# Patient Record
Sex: MALE | Race: WHITE | Employment: OTHER | URBAN - METROPOLITAN AREA
[De-identification: names, ages, dates, MRNs, and addresses within clinical notes are randomized per-mention and may not be internally consistent; named-entity substitution may affect disease eponyms.]

---

## 2018-04-04 ENCOUNTER — HOSPITAL ENCOUNTER (EMERGENCY)
Age: 83
Discharge: HOME | End: 2018-04-04
Payer: MEDICARE

## 2018-04-04 VITALS
WEIGHT: 163 LBS | HEIGHT: 67 IN | SYSTOLIC BLOOD PRESSURE: 145 MMHG | DIASTOLIC BLOOD PRESSURE: 89 MMHG | BODY MASS INDEX: 25.58 KG/M2 | TEMPERATURE: 97.9 F | RESPIRATION RATE: 18 BRPM | HEART RATE: 70 BPM | OXYGEN SATURATION: 98 %

## 2018-04-04 DIAGNOSIS — R04.0 EPISTAXIS: Primary | ICD-10-CM

## 2018-04-04 LAB
HCT VFR BLD CALC: 39.8 % (ref 37–54)
HEMOGLOBIN: 13 G/DL (ref 12.5–16.5)
INR BLD: 3
MCH RBC QN AUTO: 32.1 PG (ref 26–35)
MCHC RBC AUTO-ENTMCNC: 32.7 % (ref 32–34.5)
MCV RBC AUTO: 98.3 FL (ref 80–99.9)
PDW BLD-RTO: 14.5 FL (ref 11.5–15)
PLATELET # BLD: 160 E9/L (ref 130–450)
PMV BLD AUTO: 10.1 FL (ref 7–12)
PROTHROMBIN TIME: 33.8 SEC (ref 9.3–12.4)
RBC # BLD: 4.05 E12/L (ref 3.8–5.8)
WBC # BLD: 5.7 E9/L (ref 4.5–11.5)

## 2018-04-04 PROCEDURE — 85610 PROTHROMBIN TIME: CPT

## 2018-04-04 PROCEDURE — 85027 COMPLETE CBC AUTOMATED: CPT

## 2018-04-04 PROCEDURE — 36415 COLL VENOUS BLD VENIPUNCTURE: CPT

## 2018-04-04 PROCEDURE — 99283 EMERGENCY DEPT VISIT LOW MDM: CPT

## 2018-05-08 ENCOUNTER — OFFICE VISIT (OUTPATIENT)
Dept: NEUROLOGY | Age: 83
End: 2018-05-08
Payer: MEDICARE

## 2018-05-08 VITALS
HEIGHT: 67 IN | BODY MASS INDEX: 24.17 KG/M2 | TEMPERATURE: 97.7 F | WEIGHT: 154 LBS | OXYGEN SATURATION: 96 % | DIASTOLIC BLOOD PRESSURE: 78 MMHG | HEART RATE: 72 BPM | SYSTOLIC BLOOD PRESSURE: 123 MMHG

## 2018-05-08 DIAGNOSIS — G25.0 ESSENTIAL TREMOR: Primary | ICD-10-CM

## 2018-05-08 DIAGNOSIS — G47.52 REM SLEEP BEHAVIOR DISORDER: ICD-10-CM

## 2018-05-08 PROCEDURE — 99205 OFFICE O/P NEW HI 60 MIN: CPT | Performed by: PSYCHIATRY & NEUROLOGY

## 2018-05-08 RX ORDER — PRIMIDONE 50 MG/1
25 TABLET ORAL NIGHTLY
Qty: 30 TABLET | Refills: 5 | Status: SHIPPED | OUTPATIENT
Start: 2018-05-08 | End: 2018-10-01 | Stop reason: ALTCHOICE

## 2018-10-22 ENCOUNTER — OFFICE VISIT (OUTPATIENT)
Dept: NEUROLOGY | Age: 83
End: 2018-10-22
Payer: MEDICARE

## 2018-10-22 VITALS
RESPIRATION RATE: 18 BRPM | DIASTOLIC BLOOD PRESSURE: 72 MMHG | TEMPERATURE: 96.9 F | HEIGHT: 66 IN | OXYGEN SATURATION: 100 % | WEIGHT: 157 LBS | HEART RATE: 71 BPM | BODY MASS INDEX: 25.23 KG/M2 | SYSTOLIC BLOOD PRESSURE: 114 MMHG

## 2018-10-22 DIAGNOSIS — G25.0 ESSENTIAL TREMOR: Primary | ICD-10-CM

## 2018-10-22 DIAGNOSIS — G47.52 REM SLEEP BEHAVIOR DISORDER: ICD-10-CM

## 2018-10-22 PROCEDURE — 99215 OFFICE O/P EST HI 40 MIN: CPT | Performed by: PSYCHIATRY & NEUROLOGY

## 2018-12-04 ENCOUNTER — HOSPITAL ENCOUNTER (OUTPATIENT)
Age: 83
Discharge: HOME | End: 2018-12-04
Payer: MEDICARE

## 2018-12-04 LAB
INR BLD: 3.7
PROTHROMBIN TIME: 41.4 SEC (ref 9.3–12.4)

## 2018-12-04 PROCEDURE — 36415 COLL VENOUS BLD VENIPUNCTURE: CPT

## 2018-12-04 PROCEDURE — 85610 PROTHROMBIN TIME: CPT

## 2018-12-18 ENCOUNTER — HOSPITAL ENCOUNTER (OUTPATIENT)
Age: 83
Discharge: HOME | End: 2018-12-18
Payer: MEDICARE

## 2018-12-18 LAB
INR BLD: 2.1
PROTHROMBIN TIME: 23.7 SEC (ref 9.3–12.4)

## 2018-12-18 PROCEDURE — 85610 PROTHROMBIN TIME: CPT

## 2018-12-18 PROCEDURE — 36415 COLL VENOUS BLD VENIPUNCTURE: CPT

## 2018-12-31 ENCOUNTER — APPOINTMENT (OUTPATIENT)
Dept: GENERAL RADIOLOGY | Age: 83
DRG: 535 | End: 2018-12-31
Payer: MEDICARE

## 2018-12-31 ENCOUNTER — HOSPITAL ENCOUNTER (EMERGENCY)
Age: 83
Discharge: HOME | DRG: 535 | End: 2018-12-31
Payer: MEDICARE

## 2018-12-31 VITALS
HEART RATE: 97 BPM | WEIGHT: 160 LBS | SYSTOLIC BLOOD PRESSURE: 107 MMHG | DIASTOLIC BLOOD PRESSURE: 64 MMHG | BODY MASS INDEX: 25.82 KG/M2 | OXYGEN SATURATION: 97 % | TEMPERATURE: 98.3 F | RESPIRATION RATE: 20 BRPM

## 2018-12-31 DIAGNOSIS — J40 BRONCHITIS: Primary | ICD-10-CM

## 2018-12-31 PROCEDURE — 99212 OFFICE O/P EST SF 10 MIN: CPT

## 2018-12-31 PROCEDURE — 71046 X-RAY EXAM CHEST 2 VIEWS: CPT

## 2018-12-31 RX ORDER — DOXYCYCLINE HYCLATE 100 MG
100 TABLET ORAL 2 TIMES DAILY
Qty: 20 TABLET | Refills: 0 | Status: SHIPPED | OUTPATIENT
Start: 2018-12-31 | End: 2019-01-10

## 2018-12-31 RX ORDER — PREDNISONE 20 MG/1
40 TABLET ORAL DAILY
Qty: 10 TABLET | Refills: 0 | Status: ON HOLD | OUTPATIENT
Start: 2018-12-31 | End: 2019-01-02

## 2019-01-01 ENCOUNTER — APPOINTMENT (OUTPATIENT)
Dept: CT IMAGING | Age: 84
DRG: 535 | End: 2019-01-01
Payer: MEDICARE

## 2019-01-01 ENCOUNTER — APPOINTMENT (OUTPATIENT)
Dept: GENERAL RADIOLOGY | Age: 84
DRG: 535 | End: 2019-01-01
Payer: MEDICARE

## 2019-01-01 ENCOUNTER — HOSPITAL ENCOUNTER (INPATIENT)
Age: 84
LOS: 2 days | Discharge: NURSING, REHAB OR LONG-TERM CARE | DRG: 535 | End: 2019-01-03
Attending: EMERGENCY MEDICINE | Admitting: ORTHOPAEDIC SURGERY
Payer: MEDICARE
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
## 2019-01-01 DIAGNOSIS — S09.90XA INJURY OF HEAD, INITIAL ENCOUNTER: ICD-10-CM

## 2019-01-01 DIAGNOSIS — S32.591A CLOSED FRACTURE OF RAMUS OF RIGHT PUBIS, INITIAL ENCOUNTER (HCC): Primary | ICD-10-CM

## 2019-01-01 LAB
INR BLD: 2.1
PROTHROMBIN TIME: 24 SEC (ref 9.3–12.4)

## 2019-01-01 PROCEDURE — G0378 HOSPITAL OBSERVATION PER HR: HCPCS

## 2019-01-01 PROCEDURE — 99285 EMERGENCY DEPT VISIT HI MDM: CPT

## 2019-01-01 PROCEDURE — 85610 PROTHROMBIN TIME: CPT

## 2019-01-01 PROCEDURE — 73502 X-RAY EXAM HIP UNI 2-3 VIEWS: CPT

## 2019-01-01 PROCEDURE — 1200000000 HC SEMI PRIVATE

## 2019-01-01 PROCEDURE — 70450 CT HEAD/BRAIN W/O DYE: CPT

## 2019-01-01 PROCEDURE — 36415 COLL VENOUS BLD VENIPUNCTURE: CPT

## 2019-01-01 PROCEDURE — 72125 CT NECK SPINE W/O DYE: CPT

## 2019-01-01 RX ORDER — DOXYCYCLINE HYCLATE 100 MG/1
100 CAPSULE ORAL ONCE
Status: COMPLETED | OUTPATIENT
Start: 2019-01-01 | End: 2019-01-02

## 2019-01-01 ASSESSMENT — ENCOUNTER SYMPTOMS
TROUBLE SWALLOWING: 0
SHORTNESS OF BREATH: 0
COUGH: 0
BOWEL INCONTINENCE: 0
VISUAL CHANGE: 0
VOMITING: 0
ABDOMINAL PAIN: 0
CHEST TIGHTNESS: 0
BACK PAIN: 0
WHEEZING: 0
PHOTOPHOBIA: 0
VOICE CHANGE: 0
NAUSEA: 0

## 2019-01-02 ENCOUNTER — APPOINTMENT (OUTPATIENT)
Dept: CT IMAGING | Age: 84
DRG: 535 | End: 2019-01-02
Payer: MEDICARE

## 2019-01-02 ENCOUNTER — APPOINTMENT (OUTPATIENT)
Dept: GENERAL RADIOLOGY | Age: 84
DRG: 535 | End: 2019-01-02
Payer: MEDICARE

## 2019-01-02 PROBLEM — J44.9 COPD (CHRONIC OBSTRUCTIVE PULMONARY DISEASE) (HCC): Status: ACTIVE | Noted: 2019-01-02

## 2019-01-02 PROBLEM — J96.01 ACUTE RESPIRATORY FAILURE WITH HYPOXIA (HCC): Status: ACTIVE | Noted: 2019-01-02

## 2019-01-02 PROBLEM — J20.9 ACUTE BRONCHITIS: Status: ACTIVE | Noted: 2019-01-02

## 2019-01-02 LAB
ANION GAP SERPL CALCULATED.3IONS-SCNC: 13 MMOL/L (ref 7–16)
BASOPHILS ABSOLUTE: 0.02 E9/L (ref 0–0.2)
BASOPHILS RELATIVE PERCENT: 0.3 % (ref 0–2)
BUN BLDV-MCNC: 26 MG/DL (ref 8–23)
CALCIUM SERPL-MCNC: 8.1 MG/DL (ref 8.6–10.2)
CHLORIDE BLD-SCNC: 106 MMOL/L (ref 98–107)
CO2: 22 MMOL/L (ref 22–29)
CREAT SERPL-MCNC: 0.9 MG/DL (ref 0.7–1.2)
EOSINOPHILS ABSOLUTE: 0.32 E9/L (ref 0.05–0.5)
EOSINOPHILS RELATIVE PERCENT: 4.4 % (ref 0–6)
FILM ARRAY ADENOVIRUS: ABNORMAL
FILM ARRAY BORDETELLA PERTUSSIS: ABNORMAL
FILM ARRAY CHLAMYDOPHILIA PNEUMONIAE: ABNORMAL
FILM ARRAY CORONAVIRUS 229E: ABNORMAL
FILM ARRAY CORONAVIRUS HKU1: ABNORMAL
FILM ARRAY CORONAVIRUS NL63: ABNORMAL
FILM ARRAY CORONAVIRUS OC43: ABNORMAL
FILM ARRAY INFLUENZA A VIRUS 09H1: ABNORMAL
FILM ARRAY INFLUENZA A VIRUS H1: ABNORMAL
FILM ARRAY INFLUENZA A VIRUS H3: ABNORMAL
FILM ARRAY INFLUENZA A VIRUS: ABNORMAL
FILM ARRAY INFLUENZA B: ABNORMAL
FILM ARRAY METAPNEUMOVIRUS: ABNORMAL
FILM ARRAY MYCOPLASMA PNEUMONIAE: ABNORMAL
FILM ARRAY PARAINFLUENZA VIRUS 1: ABNORMAL
FILM ARRAY PARAINFLUENZA VIRUS 2: ABNORMAL
FILM ARRAY PARAINFLUENZA VIRUS 3: ABNORMAL
FILM ARRAY PARAINFLUENZA VIRUS 4: ABNORMAL
FILM ARRAY RESPIRATORY SYNCITIAL VIRUS: ABNORMAL
GFR AFRICAN AMERICAN: >60
GFR NON-AFRICAN AMERICAN: >60 ML/MIN/1.73
GLUCOSE BLD-MCNC: 164 MG/DL (ref 74–99)
HCT VFR BLD CALC: 37.7 % (ref 37–54)
HEMOGLOBIN: 12.3 G/DL (ref 12.5–16.5)
IMMATURE GRANULOCYTES #: 0.03 E9/L
IMMATURE GRANULOCYTES %: 0.4 % (ref 0–5)
INR BLD: 3.2
LYMPHOCYTES ABSOLUTE: 1.1 E9/L (ref 1.5–4)
LYMPHOCYTES RELATIVE PERCENT: 15 % (ref 20–42)
MCH RBC QN AUTO: 31.9 PG (ref 26–35)
MCHC RBC AUTO-ENTMCNC: 32.6 % (ref 32–34.5)
MCV RBC AUTO: 97.9 FL (ref 80–99.9)
MONOCYTES ABSOLUTE: 0.37 E9/L (ref 0.1–0.95)
MONOCYTES RELATIVE PERCENT: 5 % (ref 2–12)
NEUTROPHILS ABSOLUTE: 5.51 E9/L (ref 1.8–7.3)
NEUTROPHILS RELATIVE PERCENT: 74.9 % (ref 43–80)
ORGANISM: ABNORMAL
PDW BLD-RTO: 14.1 FL (ref 11.5–15)
PLATELET # BLD: 127 E9/L (ref 130–450)
PMV BLD AUTO: 10.2 FL (ref 7–12)
POTASSIUM SERPL-SCNC: 3.9 MMOL/L (ref 3.5–5)
PROCALCITONIN: 0.17 NG/ML (ref 0–0.08)
PROTHROMBIN TIME: 36 SEC (ref 9.3–12.4)
RBC # BLD: 3.85 E12/L (ref 3.8–5.8)
SODIUM BLD-SCNC: 141 MMOL/L (ref 132–146)
WBC # BLD: 7.4 E9/L (ref 4.5–11.5)

## 2019-01-02 PROCEDURE — 87633 RESP VIRUS 12-25 TARGETS: CPT

## 2019-01-02 PROCEDURE — 6370000000 HC RX 637 (ALT 250 FOR IP): Performed by: EMERGENCY MEDICINE

## 2019-01-02 PROCEDURE — G8978 MOBILITY CURRENT STATUS: HCPCS | Performed by: PHYSICAL THERAPIST

## 2019-01-02 PROCEDURE — 99222 1ST HOSP IP/OBS MODERATE 55: CPT | Performed by: HOSPITALIST

## 2019-01-02 PROCEDURE — 6370000000 HC RX 637 (ALT 250 FOR IP): Performed by: ORTHOPAEDIC SURGERY

## 2019-01-02 PROCEDURE — 2700000000 HC OXYGEN THERAPY PER DAY

## 2019-01-02 PROCEDURE — 84145 PROCALCITONIN (PCT): CPT

## 2019-01-02 PROCEDURE — 80048 BASIC METABOLIC PNL TOTAL CA: CPT

## 2019-01-02 PROCEDURE — 97530 THERAPEUTIC ACTIVITIES: CPT | Performed by: PHYSICAL THERAPIST

## 2019-01-02 PROCEDURE — G8988 SELF CARE GOAL STATUS: HCPCS

## 2019-01-02 PROCEDURE — G8987 SELF CARE CURRENT STATUS: HCPCS

## 2019-01-02 PROCEDURE — 85610 PROTHROMBIN TIME: CPT

## 2019-01-02 PROCEDURE — 97530 THERAPEUTIC ACTIVITIES: CPT

## 2019-01-02 PROCEDURE — 70450 CT HEAD/BRAIN W/O DYE: CPT

## 2019-01-02 PROCEDURE — 6370000000 HC RX 637 (ALT 250 FOR IP): Performed by: HOSPITALIST

## 2019-01-02 PROCEDURE — 1200000000 HC SEMI PRIVATE

## 2019-01-02 PROCEDURE — G8979 MOBILITY GOAL STATUS: HCPCS | Performed by: PHYSICAL THERAPIST

## 2019-01-02 PROCEDURE — G0378 HOSPITAL OBSERVATION PER HR: HCPCS

## 2019-01-02 PROCEDURE — 87798 DETECT AGENT NOS DNA AMP: CPT

## 2019-01-02 PROCEDURE — 36415 COLL VENOUS BLD VENIPUNCTURE: CPT

## 2019-01-02 PROCEDURE — 2580000003 HC RX 258: Performed by: ORTHOPAEDIC SURGERY

## 2019-01-02 PROCEDURE — 87581 M.PNEUMON DNA AMP PROBE: CPT

## 2019-01-02 PROCEDURE — 97165 OT EVAL LOW COMPLEX 30 MIN: CPT

## 2019-01-02 PROCEDURE — 87486 CHLMYD PNEUM DNA AMP PROBE: CPT

## 2019-01-02 PROCEDURE — 97161 PT EVAL LOW COMPLEX 20 MIN: CPT | Performed by: PHYSICAL THERAPIST

## 2019-01-02 PROCEDURE — 85025 COMPLETE CBC W/AUTO DIFF WBC: CPT

## 2019-01-02 PROCEDURE — 71045 X-RAY EXAM CHEST 1 VIEW: CPT

## 2019-01-02 RX ORDER — TRAMADOL HYDROCHLORIDE 50 MG/1
50 TABLET ORAL EVERY 6 HOURS PRN
Status: DISCONTINUED | OUTPATIENT
Start: 2019-01-02 | End: 2019-01-03 | Stop reason: HOSPADM

## 2019-01-02 RX ORDER — SODIUM CHLORIDE 0.9 % (FLUSH) 0.9 %
10 SYRINGE (ML) INJECTION EVERY 12 HOURS SCHEDULED
Status: DISCONTINUED | OUTPATIENT
Start: 2019-01-02 | End: 2019-01-03 | Stop reason: HOSPADM

## 2019-01-02 RX ORDER — ATORVASTATIN CALCIUM 10 MG/1
10 TABLET, FILM COATED ORAL EVERY OTHER DAY
Status: DISCONTINUED | OUTPATIENT
Start: 2019-01-02 | End: 2019-01-03 | Stop reason: HOSPADM

## 2019-01-02 RX ORDER — WARFARIN SODIUM 2 MG/1
4 TABLET ORAL DAILY
Status: DISCONTINUED | OUTPATIENT
Start: 2019-01-02 | End: 2019-01-02 | Stop reason: CLARIF

## 2019-01-02 RX ORDER — WARFARIN SODIUM 5 MG/1
5 TABLET ORAL DAILY
Status: DISCONTINUED | OUTPATIENT
Start: 2019-01-02 | End: 2019-01-02 | Stop reason: CLARIF

## 2019-01-02 RX ORDER — WARFARIN SODIUM 5 MG/1
5 TABLET ORAL DAILY
Status: DISCONTINUED | OUTPATIENT
Start: 2019-01-02 | End: 2019-01-02

## 2019-01-02 RX ORDER — PREDNISONE 20 MG/1
20 TABLET ORAL DAILY
Status: DISCONTINUED | OUTPATIENT
Start: 2019-01-02 | End: 2019-01-03 | Stop reason: HOSPADM

## 2019-01-02 RX ORDER — DOXYCYCLINE HYCLATE 100 MG/1
100 CAPSULE ORAL 2 TIMES DAILY
Status: DISCONTINUED | OUTPATIENT
Start: 2019-01-02 | End: 2019-01-03 | Stop reason: HOSPADM

## 2019-01-02 RX ORDER — ONDANSETRON 2 MG/ML
4 INJECTION INTRAMUSCULAR; INTRAVENOUS EVERY 6 HOURS PRN
Status: DISCONTINUED | OUTPATIENT
Start: 2019-01-02 | End: 2019-01-03 | Stop reason: HOSPADM

## 2019-01-02 RX ORDER — WARFARIN SODIUM 4 MG/1
4 TABLET ORAL DAILY
Status: DISCONTINUED | OUTPATIENT
Start: 2019-01-02 | End: 2019-01-02

## 2019-01-02 RX ORDER — PREDNISONE 20 MG/1
40 TABLET ORAL DAILY
Status: DISCONTINUED | OUTPATIENT
Start: 2019-01-02 | End: 2019-01-02

## 2019-01-02 RX ORDER — METHYLPREDNISOLONE SODIUM SUCCINATE 40 MG/ML
40 INJECTION, POWDER, LYOPHILIZED, FOR SOLUTION INTRAMUSCULAR; INTRAVENOUS EVERY 8 HOURS
Status: DISCONTINUED | OUTPATIENT
Start: 2019-01-02 | End: 2019-01-02

## 2019-01-02 RX ORDER — MORPHINE SULFATE 4 MG/ML
2 INJECTION, SOLUTION INTRAMUSCULAR; INTRAVENOUS
Status: DISCONTINUED | OUTPATIENT
Start: 2019-01-02 | End: 2019-01-03 | Stop reason: HOSPADM

## 2019-01-02 RX ORDER — ACETAMINOPHEN 325 MG/1
650 TABLET ORAL EVERY 4 HOURS PRN
Status: DISCONTINUED | OUTPATIENT
Start: 2019-01-02 | End: 2019-01-03 | Stop reason: HOSPADM

## 2019-01-02 RX ORDER — SODIUM CHLORIDE 0.9 % (FLUSH) 0.9 %
10 SYRINGE (ML) INJECTION PRN
Status: DISCONTINUED | OUTPATIENT
Start: 2019-01-02 | End: 2019-01-03 | Stop reason: HOSPADM

## 2019-01-02 RX ORDER — ALBUTEROL SULFATE 2.5 MG/3ML
2.5 SOLUTION RESPIRATORY (INHALATION) EVERY 4 HOURS PRN
Status: DISCONTINUED | OUTPATIENT
Start: 2019-01-02 | End: 2019-01-03 | Stop reason: HOSPADM

## 2019-01-02 RX ORDER — ACETAMINOPHEN 325 MG/1
TABLET ORAL
Status: DISPENSED
Start: 2019-01-02 | End: 2019-01-02

## 2019-01-02 RX ORDER — CLONAZEPAM 0.5 MG/1
0.5 TABLET ORAL NIGHTLY
Status: DISCONTINUED | OUTPATIENT
Start: 2019-01-02 | End: 2019-01-03 | Stop reason: HOSPADM

## 2019-01-02 RX ADMIN — PREDNISONE 20 MG: 20 TABLET ORAL at 16:31

## 2019-01-02 RX ADMIN — Medication 10 ML: at 21:38

## 2019-01-02 RX ADMIN — ACETAMINOPHEN 650 MG: 325 TABLET, FILM COATED ORAL at 01:59

## 2019-01-02 RX ADMIN — ATORVASTATIN CALCIUM 10 MG: 10 TABLET, FILM COATED ORAL at 21:37

## 2019-01-02 RX ADMIN — Medication 10 ML: at 09:32

## 2019-01-02 RX ADMIN — DOXYCYCLINE HYCLATE 100 MG: 100 CAPSULE ORAL at 21:37

## 2019-01-02 RX ADMIN — DOXYCYCLINE HYCLATE 100 MG: 100 CAPSULE ORAL at 00:11

## 2019-01-02 RX ADMIN — DOXYCYCLINE HYCLATE 100 MG: 100 CAPSULE ORAL at 09:31

## 2019-01-02 RX ADMIN — CLONAZEPAM 0.5 MG: 0.5 TABLET ORAL at 21:37

## 2019-01-02 ASSESSMENT — PAIN SCALES - GENERAL
PAINLEVEL_OUTOF10: 0
PAINLEVEL_OUTOF10: 0

## 2019-01-03 VITALS
DIASTOLIC BLOOD PRESSURE: 81 MMHG | BODY MASS INDEX: 23.04 KG/M2 | RESPIRATION RATE: 16 BRPM | TEMPERATURE: 98.5 F | HEIGHT: 67 IN | OXYGEN SATURATION: 97 % | HEART RATE: 70 BPM | WEIGHT: 146.8 LBS | SYSTOLIC BLOOD PRESSURE: 143 MMHG

## 2019-01-03 LAB
ANION GAP SERPL CALCULATED.3IONS-SCNC: 12 MMOL/L (ref 7–16)
BASOPHILS ABSOLUTE: 0.01 E9/L (ref 0–0.2)
BASOPHILS RELATIVE PERCENT: 0.2 % (ref 0–2)
BUN BLDV-MCNC: 20 MG/DL (ref 8–23)
CALCIUM SERPL-MCNC: 8.4 MG/DL (ref 8.6–10.2)
CHLORIDE BLD-SCNC: 103 MMOL/L (ref 98–107)
CO2: 24 MMOL/L (ref 22–29)
CREAT SERPL-MCNC: 1 MG/DL (ref 0.7–1.2)
EOSINOPHILS ABSOLUTE: 0.12 E9/L (ref 0.05–0.5)
EOSINOPHILS RELATIVE PERCENT: 1.8 % (ref 0–6)
GFR AFRICAN AMERICAN: >60
GFR NON-AFRICAN AMERICAN: >60 ML/MIN/1.73
GLUCOSE BLD-MCNC: 126 MG/DL (ref 74–99)
HCT VFR BLD CALC: 36.8 % (ref 37–54)
HEMOGLOBIN: 12.2 G/DL (ref 12.5–16.5)
IMMATURE GRANULOCYTES #: 0.03 E9/L
IMMATURE GRANULOCYTES %: 0.5 % (ref 0–5)
INR BLD: 2.7
LYMPHOCYTES ABSOLUTE: 0.86 E9/L (ref 1.5–4)
LYMPHOCYTES RELATIVE PERCENT: 13 % (ref 20–42)
MCH RBC QN AUTO: 31.8 PG (ref 26–35)
MCHC RBC AUTO-ENTMCNC: 33.2 % (ref 32–34.5)
MCV RBC AUTO: 95.8 FL (ref 80–99.9)
MONOCYTES ABSOLUTE: 0.59 E9/L (ref 0.1–0.95)
MONOCYTES RELATIVE PERCENT: 8.9 % (ref 2–12)
NEUTROPHILS ABSOLUTE: 5.01 E9/L (ref 1.8–7.3)
NEUTROPHILS RELATIVE PERCENT: 75.6 % (ref 43–80)
PDW BLD-RTO: 14.3 FL (ref 11.5–15)
PLATELET # BLD: 130 E9/L (ref 130–450)
PMV BLD AUTO: 11.1 FL (ref 7–12)
POTASSIUM SERPL-SCNC: 3.9 MMOL/L (ref 3.5–5)
PROTHROMBIN TIME: 30.2 SEC (ref 9.3–12.4)
RBC # BLD: 3.84 E12/L (ref 3.8–5.8)
SODIUM BLD-SCNC: 139 MMOL/L (ref 132–146)
WBC # BLD: 6.6 E9/L (ref 4.5–11.5)

## 2019-01-03 PROCEDURE — 36415 COLL VENOUS BLD VENIPUNCTURE: CPT

## 2019-01-03 PROCEDURE — 6370000000 HC RX 637 (ALT 250 FOR IP): Performed by: HOSPITALIST

## 2019-01-03 PROCEDURE — 85025 COMPLETE CBC W/AUTO DIFF WBC: CPT

## 2019-01-03 PROCEDURE — 99232 SBSQ HOSP IP/OBS MODERATE 35: CPT | Performed by: HOSPITALIST

## 2019-01-03 PROCEDURE — 80048 BASIC METABOLIC PNL TOTAL CA: CPT

## 2019-01-03 PROCEDURE — 85610 PROTHROMBIN TIME: CPT

## 2019-01-03 PROCEDURE — 2580000003 HC RX 258: Performed by: ORTHOPAEDIC SURGERY

## 2019-01-03 PROCEDURE — APPSS30 APP SPLIT SHARED TIME 16-30 MINUTES: Performed by: NURSE PRACTITIONER

## 2019-01-03 PROCEDURE — 6370000000 HC RX 637 (ALT 250 FOR IP): Performed by: ORTHOPAEDIC SURGERY

## 2019-01-03 PROCEDURE — G0378 HOSPITAL OBSERVATION PER HR: HCPCS

## 2019-01-03 PROCEDURE — 6370000000 HC RX 637 (ALT 250 FOR IP): Performed by: NURSE PRACTITIONER

## 2019-01-03 RX ORDER — POLYETHYLENE GLYCOL 3350 17 G/17G
17 POWDER, FOR SOLUTION ORAL DAILY PRN
Status: DISCONTINUED | OUTPATIENT
Start: 2019-01-03 | End: 2019-01-03 | Stop reason: HOSPADM

## 2019-01-03 RX ORDER — TRAMADOL HYDROCHLORIDE 50 MG/1
50 TABLET ORAL EVERY 6 HOURS PRN
Qty: 30 TABLET | Refills: 0 | Status: SHIPPED | OUTPATIENT
Start: 2019-01-03 | End: 2019-01-10

## 2019-01-03 RX ORDER — GUAIFENESIN 400 MG/1
400 TABLET ORAL 4 TIMES DAILY PRN
Status: DISCONTINUED | OUTPATIENT
Start: 2019-01-03 | End: 2019-01-03 | Stop reason: HOSPADM

## 2019-01-03 RX ORDER — PREDNISONE 20 MG/1
20 TABLET ORAL DAILY
Qty: 10 TABLET | Refills: 0 | Status: SHIPPED | OUTPATIENT
Start: 2019-01-04 | End: 2019-01-14

## 2019-01-03 RX ORDER — WARFARIN SODIUM 4 MG/1
4 TABLET ORAL
Status: DISCONTINUED | OUTPATIENT
Start: 2019-01-03 | End: 2019-01-03 | Stop reason: HOSPADM

## 2019-01-03 RX ADMIN — POLYETHYLENE GLYCOL 3350 17 G: 17 POWDER, FOR SOLUTION ORAL at 09:47

## 2019-01-03 RX ADMIN — Medication 10 ML: at 09:48

## 2019-01-03 RX ADMIN — DOXYCYCLINE HYCLATE 100 MG: 100 CAPSULE ORAL at 09:46

## 2019-01-03 RX ADMIN — PREDNISONE 20 MG: 20 TABLET ORAL at 09:46

## 2019-01-03 ASSESSMENT — PAIN SCALES - GENERAL: PAINLEVEL_OUTOF10: 0

## 2019-02-14 ENCOUNTER — HOSPITAL ENCOUNTER (OUTPATIENT)
Dept: NUCLEAR MEDICINE | Age: 84
Discharge: HOME | End: 2019-02-14
Payer: MEDICARE

## 2019-02-14 DIAGNOSIS — S39.012A BACK STRAIN, INITIAL ENCOUNTER: ICD-10-CM

## 2019-02-14 PROCEDURE — A9503 TC99M MEDRONATE: HCPCS | Performed by: RADIOLOGY

## 2019-02-14 PROCEDURE — 78306 BONE IMAGING WHOLE BODY: CPT

## 2019-02-14 PROCEDURE — 3430000000 HC RX DIAGNOSTIC RADIOPHARMACEUTICAL: Performed by: RADIOLOGY

## 2019-02-14 RX ORDER — TC 99M MEDRONATE 20 MG/10ML
25 INJECTION, POWDER, LYOPHILIZED, FOR SOLUTION INTRAVENOUS
Status: COMPLETED | OUTPATIENT
Start: 2019-02-14 | End: 2019-02-14

## 2019-02-14 RX ADMIN — Medication 25 MILLICURIE: at 08:58

## 2019-02-22 ENCOUNTER — HOSPITAL ENCOUNTER (OUTPATIENT)
Age: 84
Discharge: HOME | End: 2019-02-22
Payer: MEDICARE

## 2019-02-22 LAB
INR BLD: 1.8
PROTHROMBIN TIME: 19.9 SEC (ref 9.3–12.4)

## 2019-02-22 PROCEDURE — 85610 PROTHROMBIN TIME: CPT

## 2019-02-22 PROCEDURE — 36415 COLL VENOUS BLD VENIPUNCTURE: CPT

## 2019-02-28 PROBLEM — S32.10XA SACRAL FRACTURE, CLOSED (HCC): Status: ACTIVE | Noted: 2019-02-28

## 2019-02-28 PROBLEM — S32.010A CLOSED COMPRESSION FRACTURE OF L1 LUMBAR VERTEBRA: Status: ACTIVE | Noted: 2019-02-28

## 2019-02-28 PROBLEM — M54.9 BACK PAIN: Status: ACTIVE | Noted: 2019-02-28

## 2019-10-22 ENCOUNTER — APPOINTMENT (OUTPATIENT)
Dept: CT IMAGING | Age: 84
End: 2019-10-22
Payer: MEDICARE

## 2019-10-22 ENCOUNTER — HOSPITAL ENCOUNTER (EMERGENCY)
Age: 84
Discharge: HOME | End: 2019-10-22
Payer: MEDICARE

## 2019-10-22 VITALS
HEART RATE: 78 BPM | SYSTOLIC BLOOD PRESSURE: 123 MMHG | RESPIRATION RATE: 20 BRPM | TEMPERATURE: 97.4 F | OXYGEN SATURATION: 99 % | DIASTOLIC BLOOD PRESSURE: 70 MMHG | BODY MASS INDEX: 24.86 KG/M2 | WEIGHT: 154 LBS

## 2019-10-22 DIAGNOSIS — M79.2 NERVE PAIN: ICD-10-CM

## 2019-10-22 DIAGNOSIS — R51.9 NONINTRACTABLE HEADACHE, UNSPECIFIED CHRONICITY PATTERN, UNSPECIFIED HEADACHE TYPE: Primary | ICD-10-CM

## 2019-10-22 PROCEDURE — 99212 OFFICE O/P EST SF 10 MIN: CPT

## 2019-10-22 PROCEDURE — 70450 CT HEAD/BRAIN W/O DYE: CPT

## 2021-03-19 ENCOUNTER — APPOINTMENT (OUTPATIENT)
Dept: GENERAL RADIOLOGY | Age: 86
DRG: 522 | End: 2021-03-19
Payer: MEDICARE

## 2021-03-19 ENCOUNTER — HOSPITAL ENCOUNTER (INPATIENT)
Age: 86
LOS: 7 days | Discharge: NURSING, REHAB OR LONG-TERM CARE | DRG: 522 | End: 2021-03-26
Attending: EMERGENCY MEDICINE | Admitting: ORTHOPAEDIC SURGERY
Payer: MEDICARE

## 2021-03-19 ENCOUNTER — APPOINTMENT (OUTPATIENT)
Dept: CT IMAGING | Age: 86
DRG: 522 | End: 2021-03-19
Payer: MEDICARE
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
## 2021-03-19 DIAGNOSIS — S72.002A LEFT DISPLACED FEMORAL NECK FRACTURE (HCC): Primary | ICD-10-CM

## 2021-03-19 LAB
ABO/RH: NORMAL
ANION GAP SERPL CALCULATED.3IONS-SCNC: 11 MMOL/L (ref 7–16)
ANTIBODY SCREEN: NORMAL
BASOPHILS ABSOLUTE: 0.02 E9/L (ref 0–0.2)
BASOPHILS RELATIVE PERCENT: 0.3 % (ref 0–2)
BUN BLDV-MCNC: 20 MG/DL (ref 8–23)
CALCIUM SERPL-MCNC: 9.1 MG/DL (ref 8.6–10.2)
CHLORIDE BLD-SCNC: 103 MMOL/L (ref 98–107)
CO2: 26 MMOL/L (ref 22–29)
CREAT SERPL-MCNC: 0.9 MG/DL (ref 0.7–1.2)
EKG ATRIAL RATE: 277 BPM
EKG Q-T INTERVAL: 438 MS
EKG QRS DURATION: 94 MS
EKG QTC CALCULATION (BAZETT): 459 MS
EKG R AXIS: 76 DEGREES
EKG T AXIS: -78 DEGREES
EKG VENTRICULAR RATE: 66 BPM
EOSINOPHILS ABSOLUTE: 0.2 E9/L (ref 0.05–0.5)
EOSINOPHILS RELATIVE PERCENT: 3.1 % (ref 0–6)
GFR AFRICAN AMERICAN: >60
GFR NON-AFRICAN AMERICAN: >60 ML/MIN/1.73
GLUCOSE BLD-MCNC: 101 MG/DL (ref 74–99)
HCT VFR BLD CALC: 43.1 % (ref 37–54)
HEMOGLOBIN: 13.8 G/DL (ref 12.5–16.5)
IMMATURE GRANULOCYTES #: 0.04 E9/L
IMMATURE GRANULOCYTES %: 0.6 % (ref 0–5)
INR BLD: 2.6
INR BLD: 2.7
LYMPHOCYTES ABSOLUTE: 1.53 E9/L (ref 1.5–4)
LYMPHOCYTES RELATIVE PERCENT: 23.8 % (ref 20–42)
MCH RBC QN AUTO: 31.6 PG (ref 26–35)
MCHC RBC AUTO-ENTMCNC: 32 % (ref 32–34.5)
MCV RBC AUTO: 98.6 FL (ref 80–99.9)
MONOCYTES ABSOLUTE: 0.86 E9/L (ref 0.1–0.95)
MONOCYTES RELATIVE PERCENT: 13.4 % (ref 2–12)
NEUTROPHILS ABSOLUTE: 3.77 E9/L (ref 1.8–7.3)
NEUTROPHILS RELATIVE PERCENT: 58.8 % (ref 43–80)
PDW BLD-RTO: 14.8 FL (ref 11.5–15)
PLATELET # BLD: 230 E9/L (ref 130–450)
PMV BLD AUTO: 11.7 FL (ref 7–12)
POTASSIUM REFLEX MAGNESIUM: 3.9 MMOL/L (ref 3.5–5)
PRO-BNP: 1946 PG/ML (ref 0–450)
PROTHROMBIN TIME: 30.8 SEC (ref 9.3–12.4)
PROTHROMBIN TIME: 31.1 SEC (ref 9.3–12.4)
RBC # BLD: 4.37 E12/L (ref 3.8–5.8)
SODIUM BLD-SCNC: 140 MMOL/L (ref 132–146)
TROPONIN: <0.01 NG/ML (ref 0–0.03)
TROPONIN: <0.01 NG/ML (ref 0–0.03)
WBC # BLD: 6.4 E9/L (ref 4.5–11.5)

## 2021-03-19 PROCEDURE — 99284 EMERGENCY DEPT VISIT MOD MDM: CPT

## 2021-03-19 PROCEDURE — 6370000000 HC RX 637 (ALT 250 FOR IP): Performed by: INTERNAL MEDICINE

## 2021-03-19 PROCEDURE — 83880 ASSAY OF NATRIURETIC PEPTIDE: CPT

## 2021-03-19 PROCEDURE — 85025 COMPLETE CBC W/AUTO DIFF WBC: CPT

## 2021-03-19 PROCEDURE — APPSS60 APP SPLIT SHARED TIME 46-60 MINUTES: Performed by: PHYSICIAN ASSISTANT

## 2021-03-19 PROCEDURE — 73502 X-RAY EXAM HIP UNI 2-3 VIEWS: CPT

## 2021-03-19 PROCEDURE — 71045 X-RAY EXAM CHEST 1 VIEW: CPT

## 2021-03-19 PROCEDURE — 70450 CT HEAD/BRAIN W/O DYE: CPT

## 2021-03-19 PROCEDURE — 99223 1ST HOSP IP/OBS HIGH 75: CPT | Performed by: INTERNAL MEDICINE

## 2021-03-19 PROCEDURE — 6370000000 HC RX 637 (ALT 250 FOR IP): Performed by: ORTHOPAEDIC SURGERY

## 2021-03-19 PROCEDURE — 86900 BLOOD TYPING SEROLOGIC ABO: CPT

## 2021-03-19 PROCEDURE — 1200000000 HC SEMI PRIVATE

## 2021-03-19 PROCEDURE — P9059 PLASMA, FRZ BETWEEN 8-24HOUR: HCPCS

## 2021-03-19 PROCEDURE — 6360000002 HC RX W HCPCS: Performed by: ORTHOPAEDIC SURGERY

## 2021-03-19 PROCEDURE — 85610 PROTHROMBIN TIME: CPT

## 2021-03-19 PROCEDURE — 86850 RBC ANTIBODY SCREEN: CPT

## 2021-03-19 PROCEDURE — 2580000003 HC RX 258

## 2021-03-19 PROCEDURE — 80048 BASIC METABOLIC PNL TOTAL CA: CPT

## 2021-03-19 PROCEDURE — 84484 ASSAY OF TROPONIN QUANT: CPT

## 2021-03-19 PROCEDURE — 6360000002 HC RX W HCPCS: Performed by: PHYSICIAN ASSISTANT

## 2021-03-19 PROCEDURE — 36415 COLL VENOUS BLD VENIPUNCTURE: CPT

## 2021-03-19 PROCEDURE — 72125 CT NECK SPINE W/O DYE: CPT

## 2021-03-19 PROCEDURE — 86901 BLOOD TYPING SEROLOGIC RH(D): CPT

## 2021-03-19 PROCEDURE — 99222 1ST HOSP IP/OBS MODERATE 55: CPT | Performed by: INTERNAL MEDICINE

## 2021-03-19 PROCEDURE — 93005 ELECTROCARDIOGRAM TRACING: CPT | Performed by: STUDENT IN AN ORGANIZED HEALTH CARE EDUCATION/TRAINING PROGRAM

## 2021-03-19 RX ORDER — SODIUM CHLORIDE, SODIUM LACTATE, POTASSIUM CHLORIDE, CALCIUM CHLORIDE 600; 310; 30; 20 MG/100ML; MG/100ML; MG/100ML; MG/100ML
INJECTION, SOLUTION INTRAVENOUS CONTINUOUS
Status: DISCONTINUED | OUTPATIENT
Start: 2021-03-19 | End: 2021-03-21

## 2021-03-19 RX ORDER — FUROSEMIDE 10 MG/ML
20 INJECTION INTRAMUSCULAR; INTRAVENOUS ONCE
Status: COMPLETED | OUTPATIENT
Start: 2021-03-19 | End: 2021-03-19

## 2021-03-19 RX ORDER — POLYETHYLENE GLYCOL 3350 17 G/17G
17 POWDER, FOR SOLUTION ORAL DAILY PRN
Status: DISCONTINUED | OUTPATIENT
Start: 2021-03-19 | End: 2021-03-26 | Stop reason: HOSPADM

## 2021-03-19 RX ORDER — CLONAZEPAM 1 MG/1
0.5 TABLET ORAL DAILY
Status: DISCONTINUED | OUTPATIENT
Start: 2021-03-19 | End: 2021-03-26 | Stop reason: HOSPADM

## 2021-03-19 RX ORDER — PHYTONADIONE 5 MG/1
5 TABLET ORAL ONCE
Status: COMPLETED | OUTPATIENT
Start: 2021-03-19 | End: 2021-03-19

## 2021-03-19 RX ORDER — SODIUM CHLORIDE 0.9 % (FLUSH) 0.9 %
10 SYRINGE (ML) INJECTION EVERY 12 HOURS SCHEDULED
Status: DISCONTINUED | OUTPATIENT
Start: 2021-03-19 | End: 2021-03-21 | Stop reason: SDUPTHER

## 2021-03-19 RX ORDER — MORPHINE SULFATE 2 MG/ML
2 INJECTION, SOLUTION INTRAMUSCULAR; INTRAVENOUS
Status: DISCONTINUED | OUTPATIENT
Start: 2021-03-19 | End: 2021-03-21

## 2021-03-19 RX ORDER — SODIUM CHLORIDE 0.9 % (FLUSH) 0.9 %
10 SYRINGE (ML) INJECTION PRN
Status: DISCONTINUED | OUTPATIENT
Start: 2021-03-19 | End: 2021-03-21

## 2021-03-19 RX ORDER — MORPHINE SULFATE 2 MG/ML
2 INJECTION, SOLUTION INTRAMUSCULAR; INTRAVENOUS
Status: DISCONTINUED | OUTPATIENT
Start: 2021-03-19 | End: 2021-03-19 | Stop reason: ALTCHOICE

## 2021-03-19 RX ORDER — ONDANSETRON 2 MG/ML
4 INJECTION INTRAMUSCULAR; INTRAVENOUS EVERY 6 HOURS PRN
Status: DISCONTINUED | OUTPATIENT
Start: 2021-03-19 | End: 2021-03-26 | Stop reason: HOSPADM

## 2021-03-19 RX ORDER — TRAMADOL HYDROCHLORIDE 50 MG/1
50 TABLET ORAL EVERY 6 HOURS PRN
Status: DISCONTINUED | OUTPATIENT
Start: 2021-03-19 | End: 2021-03-26 | Stop reason: HOSPADM

## 2021-03-19 RX ORDER — ATORVASTATIN CALCIUM 20 MG/1
10 TABLET, FILM COATED ORAL EVERY OTHER DAY
Status: DISCONTINUED | OUTPATIENT
Start: 2021-03-19 | End: 2021-03-26 | Stop reason: HOSPADM

## 2021-03-19 RX ORDER — PROMETHAZINE HYDROCHLORIDE 25 MG/1
12.5 TABLET ORAL EVERY 6 HOURS PRN
Status: DISCONTINUED | OUTPATIENT
Start: 2021-03-19 | End: 2021-03-26 | Stop reason: HOSPADM

## 2021-03-19 RX ORDER — ACETAMINOPHEN 500 MG
500 TABLET ORAL EVERY 6 HOURS PRN
Status: DISCONTINUED | OUTPATIENT
Start: 2021-03-19 | End: 2021-03-26 | Stop reason: HOSPADM

## 2021-03-19 RX ORDER — TRAMADOL HYDROCHLORIDE 50 MG/1
100 TABLET ORAL EVERY 6 HOURS PRN
Status: DISCONTINUED | OUTPATIENT
Start: 2021-03-19 | End: 2021-03-26 | Stop reason: HOSPADM

## 2021-03-19 RX ORDER — SODIUM CHLORIDE, SODIUM LACTATE, POTASSIUM CHLORIDE, CALCIUM CHLORIDE 600; 310; 30; 20 MG/100ML; MG/100ML; MG/100ML; MG/100ML
INJECTION, SOLUTION INTRAVENOUS
Status: COMPLETED
Start: 2021-03-19 | End: 2021-03-19

## 2021-03-19 RX ORDER — MORPHINE SULFATE 4 MG/ML
4 INJECTION, SOLUTION INTRAMUSCULAR; INTRAVENOUS
Status: DISCONTINUED | OUTPATIENT
Start: 2021-03-19 | End: 2021-03-19 | Stop reason: ALTCHOICE

## 2021-03-19 RX ADMIN — SODIUM CHLORIDE, SODIUM LACTATE, POTASSIUM CHLORIDE, CALCIUM CHLORIDE: 600; 310; 30; 20 INJECTION, SOLUTION INTRAVENOUS at 13:30

## 2021-03-19 RX ADMIN — FUROSEMIDE 20 MG: 10 INJECTION, SOLUTION INTRAVENOUS at 16:21

## 2021-03-19 RX ADMIN — SODIUM CHLORIDE, POTASSIUM CHLORIDE, SODIUM LACTATE AND CALCIUM CHLORIDE: 600; 310; 30; 20 INJECTION, SOLUTION INTRAVENOUS at 13:30

## 2021-03-19 RX ADMIN — ACETAMINOPHEN 500 MG: 500 TABLET ORAL at 15:18

## 2021-03-19 RX ADMIN — PHYTONADIONE 5 MG: 5 TABLET ORAL at 13:30

## 2021-03-19 RX ADMIN — MORPHINE SULFATE 2 MG: 2 INJECTION, SOLUTION INTRAMUSCULAR; INTRAVENOUS at 16:21

## 2021-03-19 ASSESSMENT — ENCOUNTER SYMPTOMS
EYE PAIN: 0
SHORTNESS OF BREATH: 0
CHEST TIGHTNESS: 0
TROUBLE SWALLOWING: 0
VOMITING: 0
COUGH: 0
BACK PAIN: 0
CONSTIPATION: 0
RHINORRHEA: 0
EYE REDNESS: 0
SORE THROAT: 0
APNEA: 0
ABDOMINAL PAIN: 0
PHOTOPHOBIA: 0
NAUSEA: 0
DIARRHEA: 0
WHEEZING: 0

## 2021-03-19 ASSESSMENT — PAIN DESCRIPTION - LOCATION: LOCATION: HIP

## 2021-03-19 ASSESSMENT — PAIN SCALES - GENERAL
PAINLEVEL_OUTOF10: 5
PAINLEVEL_OUTOF10: 7
PAINLEVEL_OUTOF10: 5

## 2021-03-19 ASSESSMENT — PAIN DESCRIPTION - DESCRIPTORS: DESCRIPTORS: ACHING;SORE;SHARP

## 2021-03-19 NOTE — ED PROVIDER NOTES
[address] [facility]      Pt Name: Kelly Stephenson  MRN: [id number]  [place] 12/12/1930  Date of evaluation: 3/19/2021      CHIEF COMPLAINT       Chief Complaint   Patient presents with    Fall     fell backwards off step stool, -loc +thinners, hit head on cabinets, pain to left hip        HPI  Anmol Aquino is a 80 y.o. male with history of atrial fibrillation, hyperlipidemia, who presents to the emergency department after a fall. Apparently he had stepped up on a stool when he fell to his left side. He landed on his hip. He began to have moderate to severe pain that is constant, worse with movement, better at rest.  He is unable to walk after the fall. He is unsure if he hit his head. He states he felt mildly dizzy right before falling. He reports that he is on blood thinners though cannot remember which one. He denies any headache, numbness, weakness, tingling, chest pain, shortness of breath. Except as noted above the remainder of the review of systems was reviewed and negative. Review of Systems   Constitutional: Negative for activity change, chills, diaphoresis, fatigue and fever. HENT: Negative for rhinorrhea, sore throat and trouble swallowing. Eyes: Negative for photophobia, pain and redness. Respiratory: Negative for apnea, cough, chest tightness, shortness of breath and wheezing. Cardiovascular: Negative for chest pain, palpitations and leg swelling. Gastrointestinal: Negative for abdominal pain, constipation, diarrhea, nausea and vomiting. Endocrine: Negative for polyuria. Genitourinary: Negative for difficulty urinating and dysuria. Musculoskeletal: Negative for back pain, neck pain and neck stiffness. Reports left hip pain. Skin: Negative for pallor and rash. Neurological: Positive for dizziness. Negative for syncope, weakness, light-headedness and numbness. Psychiatric/Behavioral: Negative for confusion.  The patient who presents to the emergency department with left hip pain after fall. In the emergency department the patient is awake and alert, hemodynamically stable, afebrile and in no respiratory distress. He does have decreased range of motion and tenderness to the left hip. Neurovascularly intact. X-ray shows left femoral neck fracture. CT head without contrast showed no acute intercranial abnormality. CT cervical spine without contrast showed no fracture or dislocation. Metabolic panel showed normal electrolytes, normal renal function creatinine 0.9. EKG showed no ischemic changes troponin negative and less likely acute cardiac etiology of her symptoms. Patient is not anemic. Orthopedics, Dr. Vazquez Blanco consulted who saw the patient in the ED and accepted the patient to the hospital for further evaluation.                    --------------------------------------------- PAST HISTORY ---------------------------------------------  Past Medical History:  has a past medical history of Atrial fibrillation ([facility] [address] Cancer ([facility] [address] Hyperlipidemia, Hypertension, and Syncope and collapse. Past Surgical History:  has a past surgical history that includes Prostatectomy; pacemaker placement (Left, 2-8-2013); Cardioversion (2/22/2010); Cardioversion (8/11/2011); Cataract removal with implant; eye surgery; Prostatectomy (02/01/1988); Colonoscopy (09/19/2007); and doppler echocardiography (08/29/2019). Social History:  reports that he has quit smoking. He has never used smokeless tobacco. He reports current alcohol use. He reports that he does not use drugs. Family History: family history includes Heart Surgery in an other family member. The patients home medications have been reviewed. Allergies: Patient has no known allergies.     -------------------------------------------------- RESULTS -------------------------------------------------    LABS:  Results for orders placed or performed during the hospital encounter of 03/19/21   CBC Auto Differential   Result Value Ref Range    WBC 6.4 4.5 - 11.5 E9/L    RBC 4.37 3.80 - 5.80 E12/L    Hemoglobin 13.8 12.5 - 16.5 g/dL    Hematocrit 43.1 37.0 - 54.0 %    MCV 98.6 80.0 - 99.9 fL    MCH 31.6 26.0 - 35.0 pg    MCHC 32.0 32.0 - 34.5 %    RDW 14.8 11.5 - 15.0 fL    Platelets 549 889 - 490 E9/L    MPV 11.7 7.0 - 12.0 fL    Neutrophils % 58.8 43.0 - 80.0 %    Immature Granulocytes % 0.6 0.0 - 5.0 %    Lymphocytes % 23.8 20.0 - 42.0 %    Monocytes % 13.4 (H) 2.0 - 12.0 %    Eosinophils % 3.1 0.0 - 6.0 %    Basophils % 0.3 0.0 - 2.0 %    Neutrophils Absolute 3.77 1.80 - 7.30 E9/L    Immature Granulocytes # 0.04 E9/L    Lymphocytes Absolute 1.53 1.50 - 4.00 E9/L    Monocytes Absolute 0.86 0.10 - 0.95 E9/L    Eosinophils Absolute 0.20 0.05 - 0.50 E9/L    Basophils Absolute 0.02 0.00 - 0.20 D0/D   Basic Metabolic Panel w/ Reflex to MG   Result Value Ref Range    Sodium 140 132 - 146 mmol/L    Potassium reflex Magnesium 3.9 3.5 - 5.0 mmol/L    Chloride 103 98 - 107 mmol/L    CO2 26 22 - 29 mmol/L    Anion Gap 11 7 - 16 mmol/L    Glucose 101 (H) 74 - 99 mg/dL    BUN 20 8 - 23 mg/dL    CREATININE 0.9 0.7 - 1.2 mg/dL    GFR Non-African American >60 >=60 mL/min/1.73    GFR African American >60     Calcium 9.1 8.6 - 10.2 mg/dL   Troponin   Result Value Ref Range    Troponin <0.01 0.00 - 0.03 ng/mL   Protime-INR   Result Value Ref Range    Protime 31.1 (H) 9.3 - 12.4 sec    INR 2.7    EKG 12 Lead   Result Value Ref Range    Ventricular Rate 66 BPM    Atrial Rate 277 BPM    QRS Duration 94 ms    Q-T Interval 438 ms    QTc Calculation (Bazett) 459 ms    R Axis 76 degrees    T Axis -78 degrees       RADIOLOGY:  CT Head WO Contrast   Final Result   No acute intracranial abnormality. CT Cervical Spine WO Contrast   Final Result   1. There is no acute compression fracture or subluxation of the cervical   spine. 2. Multilevel degenerative disc and degenerative joint disease.    .         XR HIP LEFT (2-3 VIEWS)   Final Result   Left femoral neck fracture         XR CHEST 1 VIEW   Final Result   No acute cardiopulmonary abnormality. EKG:  This EKG is signed and interpreted by me. Rate: 66bpm  Rhythm: ventricular placed rhythm  Interpretation: Ventricular paced rhythm. Comparison: stable as compared to patient's most recent EKG      ------------------------- NURSING NOTES AND VITALS REVIEWED ---------------------------  Date / Time Roomed:  3/19/2021 11:11 AM  ED Bed Assignment:  [id number]    The nursing notes within the ED encounter and vital signs as below have been reviewed. Patient Vitals for the past 24 hrs:   BP Temp Temp src Pulse Resp SpO2 Height Weight   03/19/21 1530 (!) 179/84 -- -- 88 -- -- -- --   03/19/21 1415 -- -- -- -- -- -- 5' 6\" (1.676 m) 147 lb 12.8 oz (67 kg)   03/19/21 1400 (!) 199/104 98 °F (36.7 °C) Axillary 72 18 98 % -- --   03/19/21 1336 (!) 164/88 98.4 °F (36.9 °C) -- 69 18 98 % -- --   03/19/21 1126 (!) 152/86 -- -- -- -- -- -- --   03/19/21 1117 -- 98.2 °F (36.8 °C) -- 66 18 97 % -- --       Oxygen Saturation Interpretation: Normal    ------------------------------------------ PROGRESS NOTES ------------------------------------------    Counseling:  I have spoken with the patient and discussed todays results, in addition to providing specific details for the plan of care and counseling regarding the diagnosis and prognosis. Their questions are answered at this time and they are agreeable with the plan of admission.    --------------------------------- ADDITIONAL PROVIDER NOTES ---------------------------------  Consultations:  Spoke with Dr. Ca Kwok. Discussed case. They will admit the patient.   This patient's ED course included: a personal history and physicial examination, re-evaluation prior to disposition, multiple bedside re-evaluations, IV medications, cardiac monitoring and continuous pulse oximetry    This patient has remained hemodynamically stable during their ED course. Diagnosis:  1. Left displaced femoral neck fracture ([address]    2. Fall, initial encounter        Disposition:  Patient's disposition: Admit to med/surg floor  Patient's condition is stable.          Desmond Hook DO  Resident  03/19/21 0023

## 2021-03-19 NOTE — ED NOTES
EKG completed. Physician notified. Placed on telemetry monitor and pulse oximetry.       Christine Diver  03/19/21 9901

## 2021-03-19 NOTE — CONSULTS
Inpatient Cardiology Consultation      Reason for Consult: Cardiac risk stratification prior to left hip orthopedic surgical intervention     Consulting Physician: Dr. Laura Rico    Requesting Physician: Dr. Sheng William    Date of Consultation: 3/19/2021    HISTORY OF PRESENT ILLNESS:   Patient is a 80 year WM who is not previously known to [facility]. He follows with Dr. Marjorie Lee. He is being seen in consultation this hospital admission by Dr. Laura Rico for cardiac risk stratification prior to left hip orthopedic surgical intervention. Patient has a known past medical history of former tobacco abuse, complete heart block status-post permanent pacemaker placement 2013 (Dr. Yeison Freeman), atrial fibrillation on chronic Coumadin therapy, hyperlipidemia on statin therapy, hypertension, anxiety, valvular heart disease, mild pulmonary hypertension with mild LV dysfunction with global hypokinesis on echocardiogram in 2019. Patient denies any personal history of coronary artery disease or myocardial infarction. He notes of having a stress test in the past, but denies any prior left heart catheterization. Of note, at times patient is a poor historian. He is however alert and oriented to person place and time. He presented to [address] [place] [address] on March 19, 2021 after mechanical fall at home. Per chart review, supplemented by the patient, he went to grab something out of a cupboard by using a step stool and fell backwards off the stepstool. He subsequently hit his head on kitchen cabinets and fell onto his left side, fracturing his left femur. His head CT is negative for any acute intracranial abnormality this admission. He states he is unsure if he lost consciousness, but denies any symptoms prior to the episode including dizziness, chest discomfort, shortness of breath, palpitations, vision changes or nausea and vomiting. He states that he lives in a one-story home with his wife.   He ambulates on his own without a walker or cane. He states that he is active for his age, and walks around his home and outside without difficulty. He does admit to mild dyspnea on exertion when he overexerts himself. He uses the example of walking too quickly out to his mailbox. He denies any dyspnea with normal daily activities however. He denies any chest discomfort at rest or on exertion, or any issues prior to hospitalization including nausea, emesis, diaphoresis, palpitations, dizziness or near syncope. He does admit to occasional lower extremity edema bilaterally, but does not take a diuretic at home. He denies paroxysmal nocturnal dyspnea or orthopnea. He is a former tobacco smoker, denies alcohol or illicit drug use. Family history not pertinent at this time due to patient's advanced age. Labs and diagnostic testing as noted below. Please note: past medical records were reviewed per electronic medical record (EMR) - see detailed reports under Past Medical/ Surgical History. PAST MEDICAL HISTORY:    1. Former tobacco abuse. 2. Complete heart block status-post PPM in 2013 by Dr. Pavan Patel ([employer]). 3. Atrial fibrillation (?paroxysmal), on chronic Coumadin therapy. 4. Hyperlipidemia, on statin therapy. 5. Hypertension. 6. Anxiety. 7. Valvular heart disease. 8. Echocardiogram 2010 (Dr. Cathy Jj): EF 55 to 60%. Trace MR, trace TR. Borderline RV enlargement. 9. Lexiscan stress test (2014): Normal MPI. No wall motion abnormalities. No stress-induced ischemia. Preserved LVEF, EF 62%. 10. Echocardiogram August 2019 (Dr. Jasmin Orta): Mild LV global hypokinesis, EF 48 to 50%. Moderately severe TR. mild pulmonary hypertension, RVSP 49 mmHg. Mild AI. Mild MR. Mild AR. Enlarged RV. Mildly enlarged left atrium. PAST SURGICAL HISTORY:    Past Surgical History:   Procedure Laterality Date    CARDIOVERSION  2/22/2010    Successful to [facility] at [facility].  CARDIOVERSION  8/11/2011    Successful to [facility]R at [facility].  CATARACT REMOVAL WITH IMPLANT      COLONOSCOPY  09/19/2007    REDUNDANT COLON- [address]    DOPPLER ECHOCARDIOGRAPHY  08/29/2019    Dr Annette Aguayo abnormal left ventricular systolic function--mod severe tricuspid regurg w/mild pulmonary hyn--mild mitral, aortic, pulmonic regurg.  EYE SURGERY      CATARACT    PACEMAKER PLACEMENT Left 2-8-2013    Dual chamber-Dr. Richelle Mccarty    PROSTATECTOMY      20 years ago    PROSTATECTOMY  02/01/1988    RETROPUBIC RADICAL WITH/WO NERVE SPARING       HOME MEDICATIONS:  Prior to Admission medications    Medication Sig Start Date End Date Taking?  Authorizing Provider   clonazePAM (KLONOPIN) 0.5 MG tablet TAKE ONE TABLET BY MOUTH NIGHTLY AS NEEDED FOR ANXIETY FOR UP TO 30 DAYS 1/25/21 4/20/21  Josie Emerson MD   atorvastatin (LIPITOR) 10 MG tablet Take 1 tablet by mouth every other day 9/2/20   Josie Emerson MD       CURRENT MEDICATIONS:      Current Facility-Administered Medications:     atorvastatin (LIPITOR) tablet 10 mg, 10 mg, Oral, Every Other Day, Geoffrey Leger MD    clonazePAM Hardeep Bussing) tablet 0.5 mg, 0.5 mg, Oral, Daily, Geoffrey Leger MD    lactated ringers infusion, , Intravenous, Continuous, Geoffrey Leger MD, Last Rate: 125 mL/hr at 03/19/21 1330, New Bag at 03/19/21 1330    sodium chloride flush 0.9 % injection 10 mL, 10 mL, Intravenous, 2 times per day, Geoffrey Leger MD    sodium chloride flush 0.9 % injection 10 mL, 10 mL, Intravenous, PRN, Geoffrey Leger MD    morphine (PF) injection 2 mg, 2 mg, Intravenous, Q2H PRN **OR** morphine injection 4 mg, 4 mg, Intravenous, Q2H PRN, Geoffrey Leger MD    enoxaparin (LOVENOX) injection 40 mg, 40 mg, Subcutaneous, Daily, Geoffrey Leger MD    promethazine (PHENERGAN) tablet 12.5 mg, 12.5 mg, Oral, Q6H PRN **OR** ondansetron (ZOFRAN) injection 4 mg, 4 mg, Intravenous, Q6H PRN, Geoffrey Leger MD    polyethylene glycol [facility]) packet 17 g, 17 g, Oral, Daily PRN, Arlen Cisneros MD    traMADol Simona Skill) tablet 50 mg, 50 mg, Oral, Q6H PRN **OR** traMADol (ULTRAM) tablet 100 mg, 100 mg, Oral, Q6H PRN, Arlen Cisneros MD      ALLERGIES:  Patient has no known allergies. SOCIAL HISTORY:    Former tobacco smoker, denies former or current alcohol or illicit drug use. FAMILY HISTORY:   Not pertinent at this time due to patient's advanced age. REVIEW OF SYSTEMS:     · Constitutional: Denies fevers, chills, night sweats, and generalized fatigue. Denies significant weight loss or weight gain. · HEENT: Denies headaches, nose bleeds, rhinorrhea, sore throat. Denies blurred vision. Denies dysphagia, odynophagia. · Musculoskeletal: +mechanical fall. Denies pain to BLE with ambulation. Denies muscle weakness. · Neurological: Denies dizziness, numbness and tingling. Denies focal neurological deficits. · Cardiovascular: Denies chest pain, palpitations, diaphoresis. Denies near syncope, syncope. Denies PND, orthopnea. · Respiratory: +dyspnea on exertion. Denies cough, hemoptysis. · Gastrointestinal: Denies abdominal pain, nausea/vomiting, diarrhea and constipation, black/bloody, and tarry stools. · Genitourinary: Denies dysuria and hematuria. · Hematologic: Denies excessive bruising or bleeding. · Endocrine: Denies excessive thirst. Denies intolerance to hot and cold. PHYSICAL EXAM:   BP (!) 164/88   Pulse 69   Temp 98.4 °F (36.9 °C)   Resp 18   SpO2 98%   CONST:  Well developed, well nourished elderly WM who appears stated age. Awake, alert, cooperative, no apparent distress. HEENT:   Head- Normocephalic, atraumatic. Eyes- Conjunctivae pink, anicteric. Neck-  No stridor, trachea midline, ? jugular venous distention. CHEST: Chest symmetrical and non-tender to palpation. No accessory muscle use or intercostal retractions. RESPIRATORY: Lung sounds - diminished. CARDIOVASCULAR:     Heart Inspection- shows no noted pulsations.   Heart Ausculation- Irregularly irregular rhythm with controlled rate, soft systolic murmur LSB. PV: Trace-1+ bilateral lower extremity edema. Pedal pulses palpable, no clubbing or cyanosis. ABDOMEN: Soft, non-tender to light palpation. Bowel sounds present. MS: Good muscle strength and tone. No atrophy or abnormal movements. SKIN: Warm and dry. NEURO / PSYCH: Oriented to person, place and time. Follows all commands. Pleasant affect. DATA:    Telemetry: Wasn't on telemetry at time of interview     Diagnostic:  All diagnostic testing and lab work thus far this admission reviewed in detail. CXR 3/19/2021:  Impression:  No acute cardiopulmonary abnormality. Left Hip XRAY 3/19/2021:  Impression:  Left femoral neck fracture    CT Head 3/19/2021:  Impression:  No acute intracranial abnormality. CT C-spine 3/19/2021:  Impression:  1. There is no acute compression fracture or subluxation of the cervical  spine. 2. Multilevel degenerative disc and degenerative joint disease. No intake or output data in the 24 hours ending 03/19/21 1406    Labs:   CBC:   Recent Labs     03/19/21  1127   WBC 6.4   HGB 13.8   HCT 43.1        BMP:   Recent Labs     03/19/21  1243      K 3.9   CO2 26   BUN 20   CREATININE 0.9   LABGLOM >60   CALCIUM 9.1     PT/INR:   Recent Labs     03/19/21  1243   PROTIME 31.1*   INR 2.7     CARDIAC ENZYMES:  Recent Labs     03/19/21  1243   TROPONINI <0.01     FASTING LIPID PANEL:  Lab Results   Component Value Date    CHOL 137 08/08/2019    HDL 51 08/08/2019    LDLCALC 73 08/08/2019    TRIG 53 08/08/2019       ASSESSMENT:  1. Cardiac risk stratification prior to left hip orthopedic surgical intervention. 2. Left femoral neck fracture status-post mechanical fall at home. 3. Atrial fibrillation, on chronic Coumadin therapy. Therapeutic INR on admission. S/p Vitamin K for upcoming surgical intervention. 4. Mild LV dysfunction on echocardiogram in 2019 (EF 48-50%).   5. Moderately severe TR, mild AI, mild MR, mild [place] and mild pulmonary hypertension on TTE in 2019.   6. History of hypertension, not on medical therapy at home. Uncontrolled in setting of acute left hip pain. 7. Hyperlipidemia, on statin therapy. 8. Former tobacco abuse. 9. Anxiety. RECOMMENDATIONS:  1. IV Lasix 20 mg x one dose. 2. Cut back IVFs to 50 cc/hr. 3. Monitor BP. 4. Check BNP. 5. Echo for re-evaluation of LV/RV function and VHD. 6. Risk stratification to follow as per Dr. Ace Hopkins. The above case and recommendations have been discussed with Dr. Ace Hopkins. Further recommendations to follow as per him. Kelsey Jacques, [address] [place] [address], [place] [address] Cardiology    Electronically signed by Charity Rojas PA-C on 3/19/2021 at 2:06 PM   ______________________________________________________________________  Cardiology attending attestation:  I have independently interviewed and examined the patient. I personally reviewed pertinent laboratory values and diagnostic testing (including, if applicable, chest xray, electrocardiogram, telemetry, echocardiogram, stress testing, and coronary angiography). I have reviewed the above documentation completed by Kelsey Jacques PA-C including past medical, social, and family history and agree with the findings, assessment and plan except where noted. Plan formulated under my direct supervision. I participated in all aspects of the medical decision making. 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 He is to follow Dr. Donnell Salvador. Has a pacemaker for complete heart block, atrial fibrillation on warfarin. Currently followed by Dr. Wendy Christine but has not seen Dr. Donnell Salvador in several years. Daughter at the bedside helps with history.   Patient

## 2021-03-19 NOTE — CONSULTS
[address] [place], [place] [address]ist Group   History and Physical      CHIEF COMPLAINT:  Left hip pain    History of Present Illness:  80 y.o. male with a history of a fib on Coumadin, prostate cancer hyperlipidemia hypertension and previous episodes of syncope with permanent pacemaker for complete heart block presents with left hip fracture. He was reaching for something in his kitchen when he was on a stool. He remembers falling. He could be a difficult historian but careful history reveals that he actually had no prior syncopal or presymptom fall. He speculates about having dizziness but clearly upon requestioning says that he did not have any prefall symptoms  During the history his daughter entered the room. Informant(s) for H&P: Chart patient and his daughter    REVIEW OF SYSTEMS:  no fevers, chills, cp, sob, n/v, ha, vision/hearing changes, wt changes, hot/cold flashes, other open skin lesions, diarrhea, constipation, dysuria/hematuria unless noted in HPI. Complete ROS performed with the patient and is otherwise negative. PMH:  Past Medical History:   Diagnosis Date    [facility]     Cancer ([facility] [facility]     prostate     Hyperlipidemia     Hypertension     Syncope and collapse        Surgical History:  Past Surgical History:   Procedure Laterality Date    CARDIOVERSION  2/22/2010    [facility].  CARDIOVERSION  8/11/2011    [facility].  CATARACT REMOVAL WITH IMPLANT      COLONOSCOPY  09/19/2007    REDUNDANT COLON- [address]    DOPPLER ECHOCARDIOGRAPHY  08/29/2019    Dr Rogerio Dacosta abnormal left ventricular systolic function--mod severe tricuspid regurg w/mild pulmonary hyn--mild mitral, aortic, pulmonic regurg.     EYE SURGERY      CATARACT    PACEMAKER PLACEMENT Left 2-8-2013    Dual chamber-Dr. Yee-[employer]tr[employer]    PROSTATECTOMY      20 years ago    PROSTATECTOMY  02/01/1988    RETROPUBIC RADICAL WITH/WO NERVE SPARING Medications Prior to Admission:    Prior to Admission medications    Medication Sig Start Date End Date Taking? Authorizing Provider   clonazePAM (KLONOPIN) 0.5 MG tablet TAKE ONE TABLET BY MOUTH NIGHTLY AS NEEDED FOR ANXIETY FOR UP TO 30 DAYS 1/25/21 4/20/21  Julianne Snow MD   atorvastatin (LIPITOR) 10 MG tablet Take 1 tablet by mouth every other day 9/2/20   Julianne Snow MD       Allergies:    Patient has no known allergies. Social History:    reports that he has quit smoking. He has never used smokeless tobacco. He reports current alcohol use. He reports that he does not use drugs. Family History:   family history includes Heart Surgery in an other family member. PHYSICAL EXAM:  Vitals:  BP (!) 179/84   Pulse 88   Temp 98 °F (36.7 °C) (Axillary)   Resp 18   Ht 5' 6\" (1.676 m)   Wt 147 lb 12.8 oz (67 kg)   SpO2 98%   BMI 23.86 kg/m²     General Appearance: alert and oriented to person, place and time and in no acute distress  Skin: warm and dry  Head: normocephalic and atraumatic  Eyes: pupils equal, round, and reactive to light, extraocular eye movements intact, conjunctivae normal  Neck: neck supple and non tender without mass   Pulmonary/Chest: clear to auscultation bilaterally- no wheezes, rales or rhonchi, normal air movement, no respiratory distress  Cardiovascular: normal rate, normal S1 and S2 and no carotid bruits  Abdomen: soft, non-tender, non-distended, normal bowel sounds, no masses or organomegaly  Extremities: no cyanosis, no clubbing and no edema  Neurologic: no cranial nerve deficit and speech normal    LABS:  Recent Labs     03/19/21  1243      K 3.9      CO2 26   BUN 20   CREATININE 0.9   GLUCOSE 101*   CALCIUM 9.1       Recent Labs     03/19/21  1127   WBC 6.4   RBC 4.37   HGB 13.8   HCT 43.1   MCV 98.6   MCH 31.6   MCHC 32.0   RDW 14.8      MPV 11.7       No results for input(s): POCGLU in the last 72 hours.       Radiology: Xr Hip Left (2-3 performed without the administration of intravenous contrast. Multiplanar reformatted images are provided for review. Dose modulation, iterative reconstruction, and/or weight based adjustment of the mA/kV was utilized to reduce the radiation dose to as low as reasonably achievable. COMPARISON: None. HISTORY: ORDERING SYSTEM PROVIDED HISTORY: fall TECHNOLOGIST PROVIDED HISTORY: Reason for exam:->fall Decision Support Exception->Emergency Medical Condition (MA) FINDINGS: BONES/ALIGNMENT: The ring of C1 is intact as is the dense. There is no compression fracture of the cervical spine. No jumped or perched facet is noted. Multilevel degenerative disc and degenerative joint disease is noted. The prevertebral soft tissues are unremarkable. The airway is widely patent. Images through the lung apices are negative for a pneumothorax. 1. There is no acute compression fracture or subluxation of the cervical spine. 2. Multilevel degenerative disc and degenerative joint disease. Jt Valenzuela Chest 1 View    Result Date: 3/19/2021  EXAMINATION: ONE XRAY VIEW OF THE CHEST 3/19/2021 12:15 pm COMPARISON: Chest one view, 01/02/2019 HISTORY: ORDERING SYSTEM PROVIDED HISTORY: fall TECHNOLOGIST PROVIDED HISTORY: Reason for exam:->fall FINDINGS: The lungs are clear. The cardiomediastinal contour is unremarkable. Dual lead pacemaker in situ. No pneumothorax or pleural effusion is seen. No acute cardiopulmonary abnormality. EKG: Nonspecific ST-T wave abnormalities    ASSESSMENT:      Active Problems:    Left displaced femoral neck fracture (HCC)  Resolved Problems:    * No resolved hospital problems. *      PLAN:    1. Left hip fracture surgery planned for Sunday by primary orthopedic surgery. 2. Heart disease including valvular heart disease and A. yue has obtained cardiac clearance from cardiology who discussed the case with me. 3. Pretension uncontrolled initially when first came to ER with pain from fracture.   Will repeat monitor and adjust medications accordingly. 4. no change to outpatient treatment regimen for the existing stable combordities including:prostate cancer hyperlipidemia  5. Full code  6 DVT prophylaxis: Has been on Coumadin for A. fib which will washout additionally they are giving vitamin K then we will resume postop    NOTE: This report was transcribed using voice recognition software.  Every effort was made to ensure accuracy; however, inadvertent computerized transcription errors may be present.     Electronically signed by Katya Stewart MD on 3/19/2021 at 3:57 PM

## 2021-03-20 ENCOUNTER — ANESTHESIA EVENT (OUTPATIENT)
Dept: OPERATING ROOM | Age: 86
DRG: 522 | End: 2021-03-20
Payer: MEDICARE

## 2021-03-20 LAB
BLOOD BANK DISPENSE STATUS: NORMAL
BLOOD BANK PRODUCT CODE: NORMAL
BPU ID: NORMAL
DESCRIPTION BLOOD BANK: NORMAL
HCT VFR BLD CALC: 39.6 % (ref 37–54)
HEMOGLOBIN: 12.8 G/DL (ref 12.5–16.5)
INR BLD: 1.5
MCH RBC QN AUTO: 31.6 PG (ref 26–35)
MCHC RBC AUTO-ENTMCNC: 32.3 % (ref 32–34.5)
MCV RBC AUTO: 97.8 FL (ref 80–99.9)
PDW BLD-RTO: 14.5 FL (ref 11.5–15)
PLATELET # BLD: 153 E9/L (ref 130–450)
PMV BLD AUTO: 10.5 FL (ref 7–12)
PROTHROMBIN TIME: 17.1 SEC (ref 9.3–12.4)
RBC # BLD: 4.05 E12/L (ref 3.8–5.8)
WBC # BLD: 10.6 E9/L (ref 4.5–11.5)

## 2021-03-20 PROCEDURE — 36415 COLL VENOUS BLD VENIPUNCTURE: CPT

## 2021-03-20 PROCEDURE — P9059 PLASMA, FRZ BETWEEN 8-24HOUR: HCPCS

## 2021-03-20 PROCEDURE — 99232 SBSQ HOSP IP/OBS MODERATE 35: CPT | Performed by: NURSE PRACTITIONER

## 2021-03-20 PROCEDURE — 1200000000 HC SEMI PRIVATE

## 2021-03-20 PROCEDURE — 2580000003 HC RX 258: Performed by: ORTHOPAEDIC SURGERY

## 2021-03-20 PROCEDURE — 6370000000 HC RX 637 (ALT 250 FOR IP): Performed by: ORTHOPAEDIC SURGERY

## 2021-03-20 PROCEDURE — 36430 TRANSFUSION BLD/BLD COMPNT: CPT

## 2021-03-20 PROCEDURE — 6360000002 HC RX W HCPCS: Performed by: NURSE PRACTITIONER

## 2021-03-20 PROCEDURE — 85027 COMPLETE CBC AUTOMATED: CPT

## 2021-03-20 PROCEDURE — 85610 PROTHROMBIN TIME: CPT

## 2021-03-20 PROCEDURE — 6370000000 HC RX 637 (ALT 250 FOR IP): Performed by: INTERNAL MEDICINE

## 2021-03-20 RX ORDER — FUROSEMIDE 10 MG/ML
20 INJECTION INTRAMUSCULAR; INTRAVENOUS ONCE
Status: COMPLETED | OUTPATIENT
Start: 2021-03-20 | End: 2021-03-20

## 2021-03-20 RX ORDER — SODIUM CHLORIDE 9 MG/ML
INJECTION, SOLUTION INTRAVENOUS PRN
Status: DISCONTINUED | OUTPATIENT
Start: 2021-03-20 | End: 2021-03-21

## 2021-03-20 RX ORDER — PHYTONADIONE 5 MG/1
5 TABLET ORAL ONCE
Status: COMPLETED | OUTPATIENT
Start: 2021-03-20 | End: 2021-03-20

## 2021-03-20 RX ADMIN — PHYTONADIONE 5 MG: 5 TABLET ORAL at 09:31

## 2021-03-20 RX ADMIN — ACETAMINOPHEN 500 MG: 500 TABLET ORAL at 17:58

## 2021-03-20 RX ADMIN — FUROSEMIDE 20 MG: 10 INJECTION, SOLUTION INTRAVENOUS at 12:33

## 2021-03-20 RX ADMIN — ACETAMINOPHEN 500 MG: 500 TABLET ORAL at 09:31

## 2021-03-20 RX ADMIN — Medication 10 ML: at 20:42

## 2021-03-20 RX ADMIN — CLONAZEPAM 0.5 MG: 1 TABLET ORAL at 20:38

## 2021-03-20 ASSESSMENT — PAIN SCALES - GENERAL
PAINLEVEL_OUTOF10: 0
PAINLEVEL_OUTOF10: 3
PAINLEVEL_OUTOF10: 3
PAINLEVEL_OUTOF10: 0

## 2021-03-20 NOTE — PLAN OF CARE
Problem: Pain:  Goal: Pain level will decrease  Description: Pain level will decrease  3/20/2021 0415 by Fransisca Walsh RN  Outcome: Met This Shift     Problem: Pain:  Goal: Control of acute pain  Description: Control of acute pain  3/20/2021 0415 by Fransisca Walsh RN  Outcome: Met This Shift     Problem: Pain:  Goal: Control of chronic pain  Description: Control of chronic pain  Outcome: Met This Shift     Problem: Falls - Risk of:  Goal: Will remain free from falls  Description: Will remain free from falls  3/20/2021 0415 by Fransisca Walsh RN  Outcome: Met This Shift     Problem: Falls - Risk of:  Goal: Absence of physical injury  Description: Absence of physical injury  Outcome: Met This Shift     Problem: SAFETY  Goal: Free from accidental physical injury  Outcome: Met This Shift     Problem: DAILY CARE  Goal: Daily care needs are met  Outcome: Met This Shift     Problem: SKIN INTEGRITY  Goal: Skin integrity is maintained or improved  Outcome: Met This Shift     Problem: KNOWLEDGE DEFICIT  Goal: Patient/S.O. demonstrates understanding of disease process, treatment plan, medications, and discharge instructions.   Outcome: Met This Shift

## 2021-03-20 NOTE — ANESTHESIA PRE PROCEDURE
Department of Anesthesiology  Preprocedure Note       Name:  Rodman Meigs   Age:  80 y.o.  :  1930                                          MRN:  [id number]         Date:  3/20/2021      Surgeon: Gildardo Canas):  Ivin Phoenix, MD    Procedure: Procedure(s):  LEFT HIP HEMIARTHROPLASTY [facility])    Medications prior to admission:   Prior to Admission medications    Medication Sig Start Date End Date Taking?  Authorizing Provider   clonazePAM (KLONOPIN) 0.5 MG tablet TAKE ONE TABLET BY MOUTH NIGHTLY AS NEEDED FOR ANXIETY FOR UP TO 30 DAYS 21  Jameson Pak MD   atorvastatin (LIPITOR) 10 MG tablet Take 1 tablet by mouth every other day 20   Jameson Pak MD       Current medications:    Current Facility-Administered Medications   Medication Dose Route Frequency Provider Last Rate Last Admin    phytonadione (VITAMIN K) tablet 5 mg  5 mg Oral Once Ivin Phoenix, MD        0.9 % sodium chloride infusion   Intravenous PRN Ivin Phoenix, MD        atorvastatin (LIPITOR) tablet 10 mg  10 mg Oral Every Other Day Ivin Phoenix, MD   Stopped at 21 1501    clonazePAM (KLONOPIN) tablet 0.5 mg  0.5 mg Oral Daily Ivin Phoenix, MD        lactated ringers infusion   Intravenous Continuous [place], PA-C 50 mL/hr at 21 1532 Restarted at 21 1532    sodium chloride flush 0.9 % injection 10 mL  10 mL Intravenous 2 times per day Ivin Phoenix, MD        sodium chloride flush 0.9 % injection 10 mL  10 mL Intravenous PRN Ivin Phoenix, MD        enoxaparin (LOVENOX) injection 40 mg  40 mg Subcutaneous Daily Ivin Phoenix, MD   Stopped at 21 1519    promethazine (PHENERGAN) tablet 12.5 mg  12.5 mg Oral Q6H PRN Ivin Phoenix, MD        Or    ondansetron [facility]) injection 4 mg  4 mg Intravenous Q6H PRN Ivin Phoenix, MD        polyethylene glycol [facility]) packet 17 g  17 g Oral Daily PRN Ivin Phoenix, MD PLACEMENT Left 2-8-2013    Dual chamber-Dr. Yee-[employer]    PROSTATECTOMY      20 years ago    PROSTATECTOMY  02/01/1988    RETROPUBIC RADICAL WITH/WO NERVE SPARING       Social History:    Social History     Tobacco Use    Smoking status: Former Smoker    Smokeless tobacco: Never Used    Tobacco comment: Quit 30 years ago. Substance Use Topics    Alcohol use: Yes     Comment: social                                Counseling given: Not Answered  Comment: Quit 30 years ago. Vital Signs (Current):   Vitals:    03/19/21 1530 03/19/21 1800 03/19/21 2100 03/20/21 0530   BP: (!) 179/84 (!) 97/57 (!) 101/51 (!) 140/72   Pulse: 88 80 76 72   Resp:  16  16   Temp:  98.2 °F (36.8 °C)  97.4 °F (36.3 °C)   TempSrc:  Axillary  Axillary   SpO2:  96%  92%   Weight:       Height:                                                  BP Readings from Last 3 Encounters:   03/20/21 (!) 140/72   03/05/21 122/78   02/05/21 132/68       NPO Status:                                                                                 BMI:   Wt Readings from Last 3 Encounters:   03/19/21 147 lb 12.8 oz (67 kg)   03/05/21 150 lb (68 kg)   02/05/21 151 lb (68.5 kg)     Body mass index is 23.86 kg/m².     CBC:   Lab Results   Component Value Date    WBC 10.6 03/20/2021    RBC 4.05 03/20/2021    HGB 12.8 03/20/2021    HCT 39.6 03/20/2021    MCV 97.8 03/20/2021    RDW 14.5 03/20/2021     03/20/2021       CMP:   Lab Results   Component Value Date     03/19/2021    K 3.9 03/19/2021     03/19/2021    CO2 26 03/19/2021    BUN 20 03/19/2021    CREATININE 0.9 03/19/2021    GFRAA >60 03/19/2021    LABGLOM >60 03/19/2021    GLUCOSE 101 03/19/2021    GLUCOSE 115 03/05/2012    PROT 7.4 01/25/2018    CALCIUM 9.1 03/19/2021    BILITOT 1.1 08/08/2019    ALKPHOS 82 08/08/2019    AST 19 08/08/2019    ALT 9 08/08/2019       POC Tests: No results for input(s): POCGLU, POCNA, POCK, POCCL, POCBUN, POCHEMO, POCHCT in the last 72 hours.    Coags:   Lab Results   Component Value Date    PROTIME 30.8 03/19/2021    PROTIME 30.2 03/05/2021    INR 2.6 03/19/2021    APTT 40.4 02/08/2013       HCG (If Applicable): No results found for: PREGTESTUR, PREGSERUM, HCG, HCGQUANT     ABGs: No results found for: PHART, PO2ART, UCP7MNR, VXH9JPW, BEART, F4BJRDVT     Type & Screen (If Applicable):  No results found for: LABABO, LABRH    Drug/Infectious Status (If Applicable):  No results found for: HIV, HEPCAB    COVID-19 Screening (If Applicable): No results found for: COVID19        Anesthesia Evaluation  Patient summary reviewed  Airway: Mallampati: III  TM distance: >3 FB   Neck ROM: full  Mouth opening: > = 3 FB Dental:      Comment: Dentition intact, patient denies any loose teeth. Pulmonary: breath sounds clear to auscultation  (+) COPD:  decreased breath sounds,                            ROS comment: H/O Acute respiratory failure with hypoxia, former smoker 1 PPD x 15 years, quit 40 years ago. Cardiovascular:  Exercise tolerance: poor (<4 METS),   (+) hypertension:, pacemaker ( Placed for complete heart block):, dysrhythmias: atrial fibrillation, murmur ( Grade 2/6 murmur), hyperlipidemia        Rhythm: irregular  Rate: normal                 ROS comment: H/O Syncope and collapse, orthostatic hypotension, Sinus bradycardia-tachycardia syndrome. Pacemaker placed for complete heart block. Neuro/Psych:   (+) neuromuscular disease ( Closed compression fracture of L1 lumbar vertebra, chronic back pain ):,             GI/Hepatic/Renal:   (+) renal disease (H/O prostate cancer):,           Endo/Other:    (+) : arthritis ( ):., malignancy/cancer ( Prostate cancer in 1988). Abdominal:           Vascular:                                  Anesthesia Plan      general     ASA 4 - emergent       Induction: intravenous. MIPS: Postoperative opioids intended. Anesthetic plan and risks discussed with patient.       Plan discussed with Carmelita Godfrey MD   3/20/2021

## 2021-03-20 NOTE — PROGRESS NOTES
Admitting Date and Time: 3/19/2021 11:11 AM  Admit Dx: Left displaced femoral neck fracture ([facility] [address] [S72.002A]    Subjective:    Pt feels ok. Mildly confused  Per RN: no issues    ROS: denies fever, chills, cp, sob, n/v, HA unless stated above.  phytonadione  5 mg Oral Once    atorvastatin  10 mg Oral Every Other Day    clonazePAM  0.5 mg Oral Daily    sodium chloride flush  10 mL Intravenous 2 times per day    enoxaparin  40 mg Subcutaneous Daily     sodium chloride, , PRN  sodium chloride flush, 10 mL, PRN  promethazine, 12.5 mg, Q6H PRN    Or  ondansetron, 4 mg, Q6H PRN  polyethylene glycol, 17 g, Daily PRN  traMADol, 50 mg, Q6H PRN    Or  traMADol, 100 mg, Q6H PRN  morphine, 2 mg, Q2H PRN  acetaminophen, 500 mg, Q6H PRN         Objective:    BP (!) 140/72   Pulse 72   Temp 97.4 °F (36.3 °C) (Axillary)   Resp 16   Ht 5' 6\" (1.676 m)   Wt 147 lb 12.8 oz (67 kg)   SpO2 92%   BMI 23.86 kg/m²   General Appearance: alert and oriented to person, place and time and in no acute distress  Skin: warm and dry  Head: normocephalic and atraumatic  Eyes: pupils equal, round, and reactive to light, extraocular eye movements intact, conjunctivae normal  Neck: neck supple and non tender without mass   Pulmonary/Chest: clear to auscultation bilaterally- no wheezes, rales or rhonchi, normal air movement, no respiratory distress  Cardiovascular: normal rate, normal S1 and S2 and no carotid bruits  Abdomen: soft, non-tender, non-distended, normal bowel sounds, no masses or organomegaly  Extremities: no cyanosis, no clubbing and no  edema  Neurologic: no cranial nerve deficit and speech normal, mildly confused. Thick accent      Recent Labs     03/19/21  1243      K 3.9      CO2 26   BUN 20   CREATININE 0.9   GLUCOSE 101*   CALCIUM 9.1       No results for input(s): ALKPHOS, PROT, LABALBU, BILITOT, AST, ALT in the last 72 hours.     Recent Labs     03/19/21  1127 03/20/21  0428   WBC 6.4 10.6   RBC 4.37 4.05 HGB 13.8 12.8   HCT 43.1 39.6   MCV 98.6 97.8   MCH 31.6 31.6   MCHC 32.0 32.3   RDW 14.8 14.5    153   MPV 11.7 10.5           Radiology:   CT Head WO Contrast   Final Result   No acute intracranial abnormality. CT Cervical Spine WO Contrast   Final Result   1. There is no acute compression fracture or subluxation of the cervical   spine. 2. Multilevel degenerative disc and degenerative joint disease. .         XR HIP LEFT (2-3 VIEWS)   Final Result   Left femoral neck fracture         XR CHEST 1 VIEW   Final Result   No acute cardiopulmonary abnormality. Assessment:  Active Problems:    Left displaced femoral neck fracture (HCC)  Resolved Problems:    * No resolved hospital problems. *      Plan:  History of present illness from History and Physical:  80 y.o. male with a history of a fib on Coumadin, prostate cancer hyperlipidemia hypertension and previous episodes of syncope with permanent pacemaker for complete heart block presents with left hip fracture. He was reaching for something in his kitchen when he was on a stool. He remembers falling. He could be a difficult historian but careful history reveals that he actually had no prior syncopal or presymptom fall. He speculates about having dizziness but clearly upon requestioning says that he did not have any prefall symptoms  During the history his daughter entered the room.       1. Left hip fracture surgery planned for Sunday by primary orthopedic surgery. 2. Heart disease including valvular heart disease and RYLEY turner has obtained cardiac clearance from cardiology who discussed the case with me. 3. Pretension uncontrolled initially when first came to ER with pain from fracture. Will repeat monitor and adjust medications accordingly. 4. no change to outpatient treatment regimen for the existing stable combordities including:prostate cancer hyperlipidemia  5. Full code  6 DVT prophylaxis: Has been on Coumadin for AGordon Fib. Vit k on 3/20. Can resume coumadinpostop       NOTE: This report was transcribed using voice recognition software. Every effort was made to ensure accuracy; however, inadvertent computerized transcription errors may be present.      Electronically signed by Ruddy Manuel MD on 3/20/2021 at 8:47 AM

## 2021-03-20 NOTE — PROGRESS NOTES
hours) at 3/20/2021 1212  Last data filed at 3/20/2021 0800  Gross per 24 hour   Intake 876.67 ml   Output 1725 ml   Net -848. 33 ml       Weight:   Wt Readings from Last 3 Encounters:   03/19/21 147 lb 12.8 oz (67 kg)   03/05/21 150 lb (68 kg)   02/05/21 151 lb (68.5 kg)     Current Inpatient Medications:   atorvastatin  10 mg Oral Every Other Day    clonazePAM  0.5 mg Oral Daily    sodium chloride flush  10 mL Intravenous 2 times per day    enoxaparin  40 mg Subcutaneous Daily       IV Infusions (if any):   sodium chloride      lactated ringers 50 mL/hr at 03/19/21 1532       DIAGNOSTIC/ LABORATORY DATA:  Labs:   CBC:   Recent Labs     03/19/21  1127 03/20/21  0428   WBC 6.4 10.6   HGB 13.8 12.8   HCT 43.1 39.6    153     BMP:   Recent Labs     03/19/21  1243      K 3.9   CO2 26   BUN 20   CREATININE 0.9   LABGLOM >60   CALCIUM 9.1       PT/INR:   Recent Labs     03/19/21  1243 03/19/21  2221   PROTIME 31.1* 30.8*   INR 2.7 2.6     CARDIAC ENZYMES:  Recent Labs     03/19/21  1243 03/19/21  2221   TROPONINI <0.01 <0.01     FASTING LIPID PANEL:  Lab Results   Component Value Date    CHOL 137 08/08/2019    HDL 51 08/08/2019    LDLCALC 73 08/08/2019    TRIG 53 08/08/2019     L Hip Xray 3/19/94671: Left femoral neck fracture    CXR 3/19/2021: No acute cardiopulmonary abnormality    CT Head WO 3/19/2021: No acute intracranial abnormality    CT Cervical Spine 3/19/2021: There is no acute compression fracture or subluxation of the cervical spine. Multilevel degenerative disc and degenerative joint disease    Telemetry: non monitored    Echo: Ordered    ASSESSMENT:   1. L femoral neck fracture s/p mechanical fall  2. PAF Hx DCCV (2010/2011/2013). Hx Tikosyn and sotalol  3. [address] with coumadin on hold (INR 2.7-->2. 6) received 2 doses of vitamin K  4. Mild LV dysfunction on TTE 2019 (EF 48-50%)  5. VHD  6. CHB s/p PPM ([employer]) in 2/2013  7. Hx HTN. Per family pt's BP runs low at home.  ?elevated BP

## 2021-03-20 NOTE — CARE COORDINATION
SS Note/Discharge plan:  Pt admitted with a hip fx from a fall, pt lives at home with his wife, anticipate pt having orthopedic surgical intervention on Darian 3/21, sw will follow post-op with pt and his wife to discuss rehab options for transition of care plan and for therapy evals/recommendations to assist with d/c planning. Nursing notified.  Electronically signed by MICHEL Aceves on 3/20/2021 at 4:00 PM

## 2021-03-21 ENCOUNTER — APPOINTMENT (OUTPATIENT)
Dept: GENERAL RADIOLOGY | Age: 86
DRG: 522 | End: 2021-03-21
Payer: MEDICARE

## 2021-03-21 ENCOUNTER — ANESTHESIA (OUTPATIENT)
Dept: OPERATING ROOM | Age: 86
DRG: 522 | End: 2021-03-21
Payer: MEDICARE

## 2021-03-21 VITALS
RESPIRATION RATE: 14 BRPM | OXYGEN SATURATION: 93 % | DIASTOLIC BLOOD PRESSURE: 95 MMHG | SYSTOLIC BLOOD PRESSURE: 187 MMHG | TEMPERATURE: 98.1 F

## 2021-03-21 LAB
INR BLD: 1.3
PROTHROMBIN TIME: 15.4 SEC (ref 9.3–12.4)

## 2021-03-21 PROCEDURE — 2500000003 HC RX 250 WO HCPCS: Performed by: ANESTHESIOLOGY

## 2021-03-21 PROCEDURE — 3600000005 HC SURGERY LEVEL 5 BASE: Performed by: ORTHOPAEDIC SURGERY

## 2021-03-21 PROCEDURE — 6370000000 HC RX 637 (ALT 250 FOR IP): Performed by: NURSE ANESTHETIST, CERTIFIED REGISTERED

## 2021-03-21 PROCEDURE — 94150 VITAL CAPACITY TEST: CPT

## 2021-03-21 PROCEDURE — 2580000003 HC RX 258: Performed by: INTERNAL MEDICINE

## 2021-03-21 PROCEDURE — 3600000015 HC SURGERY LEVEL 5 ADDTL 15MIN: Performed by: ORTHOPAEDIC SURGERY

## 2021-03-21 PROCEDURE — 99232 SBSQ HOSP IP/OBS MODERATE 35: CPT | Performed by: INTERNAL MEDICINE

## 2021-03-21 PROCEDURE — 97161 PT EVAL LOW COMPLEX 20 MIN: CPT | Performed by: PHYSICAL THERAPIST

## 2021-03-21 PROCEDURE — 3700000000 HC ANESTHESIA ATTENDED CARE: Performed by: ORTHOPAEDIC SURGERY

## 2021-03-21 PROCEDURE — 6360000002 HC RX W HCPCS: Performed by: NURSE ANESTHETIST, CERTIFIED REGISTERED

## 2021-03-21 PROCEDURE — 1200000000 HC SEMI PRIVATE

## 2021-03-21 PROCEDURE — 2580000003 HC RX 258: Performed by: NURSE ANESTHETIST, CERTIFIED REGISTERED

## 2021-03-21 PROCEDURE — 2709999900 HC NON-CHARGEABLE SUPPLY: Performed by: ORTHOPAEDIC SURGERY

## 2021-03-21 PROCEDURE — 3700000001 HC ADD 15 MINUTES (ANESTHESIA): Performed by: ORTHOPAEDIC SURGERY

## 2021-03-21 PROCEDURE — 97110 THERAPEUTIC EXERCISES: CPT | Performed by: PHYSICAL THERAPIST

## 2021-03-21 PROCEDURE — 36415 COLL VENOUS BLD VENIPUNCTURE: CPT

## 2021-03-21 PROCEDURE — 6370000000 HC RX 637 (ALT 250 FOR IP): Performed by: ORTHOPAEDIC SURGERY

## 2021-03-21 PROCEDURE — C1776 JOINT DEVICE (IMPLANTABLE): HCPCS | Performed by: ORTHOPAEDIC SURGERY

## 2021-03-21 PROCEDURE — 99231 SBSQ HOSP IP/OBS SF/LOW 25: CPT | Performed by: PHYSICIAN ASSISTANT

## 2021-03-21 PROCEDURE — 2500000003 HC RX 250 WO HCPCS: Performed by: NURSE ANESTHETIST, CERTIFIED REGISTERED

## 2021-03-21 PROCEDURE — 85610 PROTHROMBIN TIME: CPT

## 2021-03-21 PROCEDURE — 6360000002 HC RX W HCPCS: Performed by: ORTHOPAEDIC SURGERY

## 2021-03-21 PROCEDURE — 73502 X-RAY EXAM HIP UNI 2-3 VIEWS: CPT

## 2021-03-21 PROCEDURE — 0SRS0JA REPLACEMENT OF LEFT HIP JOINT, FEMORAL SURFACE WITH SYNTHETIC SUBSTITUTE, UNCEMENTED, OPEN APPROACH: ICD-10-PCS | Performed by: ORTHOPAEDIC SURGERY

## 2021-03-21 PROCEDURE — 6360000002 HC RX W HCPCS

## 2021-03-21 PROCEDURE — 7100000000 HC PACU RECOVERY - FIRST 15 MIN: Performed by: ORTHOPAEDIC SURGERY

## 2021-03-21 PROCEDURE — 7100000001 HC PACU RECOVERY - ADDTL 15 MIN: Performed by: ORTHOPAEDIC SURGERY

## 2021-03-21 DEVICE — OXINIUM FEMORAL HEAD 12/14 TAPER                                    28 MM -3
Type: IMPLANTABLE DEVICE | Site: HIP | Status: FUNCTIONAL
Brand: OXINIUM

## 2021-03-21 DEVICE — TANDEM BIPOLAR COBALT CHROME 54MM                                    OUTER DIAMETER 28MM INNER DIAMETER
Type: IMPLANTABLE DEVICE | Site: HIP | Status: FUNCTIONAL
Brand: TANDEM

## 2021-03-21 DEVICE — POLARSTEM STANDARD NON-CEMENTED                                    WITH TI/HA 3
Type: IMPLANTABLE DEVICE | Site: HIP | Status: FUNCTIONAL
Brand: POLARSTEM

## 2021-03-21 DEVICE — HIP H4 TOT HEMI UNIV BIPLR IMPL CAPPED H4 SN: Type: IMPLANTABLE DEVICE | Site: HIP | Status: FUNCTIONAL

## 2021-03-21 RX ORDER — PROMETHAZINE HYDROCHLORIDE 25 MG/ML
6.25 INJECTION, SOLUTION INTRAMUSCULAR; INTRAVENOUS
Status: DISCONTINUED | OUTPATIENT
Start: 2021-03-21 | End: 2021-03-21 | Stop reason: HOSPADM

## 2021-03-21 RX ORDER — ROCURONIUM BROMIDE 10 MG/ML
INJECTION, SOLUTION INTRAVENOUS PRN
Status: DISCONTINUED | OUTPATIENT
Start: 2021-03-21 | End: 2021-03-21 | Stop reason: SDUPTHER

## 2021-03-21 RX ORDER — PROPOFOL 10 MG/ML
INJECTION, EMULSION INTRAVENOUS PRN
Status: DISCONTINUED | OUTPATIENT
Start: 2021-03-21 | End: 2021-03-21 | Stop reason: SDUPTHER

## 2021-03-21 RX ORDER — MEPERIDINE HYDROCHLORIDE 25 MG/ML
12.5 INJECTION INTRAMUSCULAR; INTRAVENOUS; SUBCUTANEOUS EVERY 5 MIN PRN
Status: DISCONTINUED | OUTPATIENT
Start: 2021-03-21 | End: 2021-03-21 | Stop reason: HOSPADM

## 2021-03-21 RX ORDER — SODIUM CHLORIDE 9 MG/ML
INJECTION, SOLUTION INTRAVENOUS CONTINUOUS
Status: DISCONTINUED | OUTPATIENT
Start: 2021-03-21 | End: 2021-03-24

## 2021-03-21 RX ORDER — ALBUTEROL SULFATE 90 UG/1
AEROSOL, METERED RESPIRATORY (INHALATION) PRN
Status: DISCONTINUED | OUTPATIENT
Start: 2021-03-21 | End: 2021-03-21 | Stop reason: SDUPTHER

## 2021-03-21 RX ORDER — WARFARIN SODIUM 10 MG/1
10 TABLET ORAL
Status: COMPLETED | OUTPATIENT
Start: 2021-03-21 | End: 2021-03-21

## 2021-03-21 RX ORDER — SODIUM CHLORIDE 0.9 % (FLUSH) 0.9 %
10 SYRINGE (ML) INJECTION EVERY 12 HOURS SCHEDULED
Status: DISCONTINUED | OUTPATIENT
Start: 2021-03-21 | End: 2021-03-26 | Stop reason: HOSPADM

## 2021-03-21 RX ORDER — SENNA AND DOCUSATE SODIUM 50; 8.6 MG/1; MG/1
1 TABLET, FILM COATED ORAL 2 TIMES DAILY
Status: DISCONTINUED | OUTPATIENT
Start: 2021-03-21 | End: 2021-03-26 | Stop reason: HOSPADM

## 2021-03-21 RX ORDER — DIPHENHYDRAMINE HYDROCHLORIDE 50 MG/ML
12.5 INJECTION INTRAMUSCULAR; INTRAVENOUS
Status: DISCONTINUED | OUTPATIENT
Start: 2021-03-21 | End: 2021-03-21 | Stop reason: HOSPADM

## 2021-03-21 RX ORDER — LABETALOL HYDROCHLORIDE 5 MG/ML
5 INJECTION, SOLUTION INTRAVENOUS EVERY 10 MIN PRN
Status: DISCONTINUED | OUTPATIENT
Start: 2021-03-21 | End: 2021-03-21 | Stop reason: HOSPADM

## 2021-03-21 RX ORDER — PROCHLORPERAZINE EDISYLATE 5 MG/ML
5 INJECTION INTRAMUSCULAR; INTRAVENOUS
Status: DISCONTINUED | OUTPATIENT
Start: 2021-03-21 | End: 2021-03-21 | Stop reason: HOSPADM

## 2021-03-21 RX ORDER — NEOSTIGMINE METHYLSULFATE 1 MG/ML
INJECTION, SOLUTION INTRAVENOUS PRN
Status: DISCONTINUED | OUTPATIENT
Start: 2021-03-21 | End: 2021-03-21 | Stop reason: SDUPTHER

## 2021-03-21 RX ORDER — FENTANYL CITRATE 50 UG/ML
INJECTION, SOLUTION INTRAMUSCULAR; INTRAVENOUS PRN
Status: DISCONTINUED | OUTPATIENT
Start: 2021-03-21 | End: 2021-03-21 | Stop reason: SDUPTHER

## 2021-03-21 RX ORDER — OXYCODONE HYDROCHLORIDE AND ACETAMINOPHEN 5; 325 MG/1; MG/1
1 TABLET ORAL EVERY 4 HOURS PRN
Status: DISCONTINUED | OUTPATIENT
Start: 2021-03-21 | End: 2021-03-21 | Stop reason: HOSPADM

## 2021-03-21 RX ORDER — SODIUM CHLORIDE 0.9 % (FLUSH) 0.9 %
10 SYRINGE (ML) INJECTION PRN
Status: DISCONTINUED | OUTPATIENT
Start: 2021-03-21 | End: 2021-03-26 | Stop reason: HOSPADM

## 2021-03-21 RX ORDER — CEFAZOLIN SODIUM 2 G/50ML
2000 SOLUTION INTRAVENOUS EVERY 8 HOURS
Status: COMPLETED | OUTPATIENT
Start: 2021-03-21 | End: 2021-03-21

## 2021-03-21 RX ORDER — CEFAZOLIN SODIUM 1 G/50ML
SOLUTION INTRAVENOUS PRN
Status: DISCONTINUED | OUTPATIENT
Start: 2021-03-21 | End: 2021-03-21 | Stop reason: SDUPTHER

## 2021-03-21 RX ORDER — LIDOCAINE HYDROCHLORIDE 20 MG/ML
INJECTION, SOLUTION INTRAVENOUS PRN
Status: DISCONTINUED | OUTPATIENT
Start: 2021-03-21 | End: 2021-03-21 | Stop reason: SDUPTHER

## 2021-03-21 RX ORDER — MORPHINE SULFATE 2 MG/ML
2 INJECTION, SOLUTION INTRAMUSCULAR; INTRAVENOUS EVERY 4 HOURS PRN
Status: DISCONTINUED | OUTPATIENT
Start: 2021-03-21 | End: 2021-03-26 | Stop reason: HOSPADM

## 2021-03-21 RX ORDER — GLYCOPYRROLATE 1 MG/5 ML
SYRINGE (ML) INTRAVENOUS PRN
Status: DISCONTINUED | OUTPATIENT
Start: 2021-03-21 | End: 2021-03-21 | Stop reason: SDUPTHER

## 2021-03-21 RX ORDER — CEFAZOLIN SODIUM 2 G/50ML
SOLUTION INTRAVENOUS
Status: COMPLETED
Start: 2021-03-21 | End: 2021-03-21

## 2021-03-21 RX ORDER — ONDANSETRON 2 MG/ML
INJECTION INTRAMUSCULAR; INTRAVENOUS PRN
Status: DISCONTINUED | OUTPATIENT
Start: 2021-03-21 | End: 2021-03-21 | Stop reason: SDUPTHER

## 2021-03-21 RX ORDER — SODIUM CHLORIDE 9 MG/ML
INJECTION, SOLUTION INTRAVENOUS CONTINUOUS PRN
Status: DISCONTINUED | OUTPATIENT
Start: 2021-03-21 | End: 2021-03-21 | Stop reason: SDUPTHER

## 2021-03-21 RX ADMIN — FENTANYL CITRATE 50 MCG: 50 INJECTION, SOLUTION INTRAMUSCULAR; INTRAVENOUS at 08:08

## 2021-03-21 RX ADMIN — SODIUM CHLORIDE: 9 INJECTION, SOLUTION INTRAVENOUS at 17:56

## 2021-03-21 RX ADMIN — Medication 3 MG: at 09:09

## 2021-03-21 RX ADMIN — PROPOFOL 80 MG: 10 INJECTION, EMULSION INTRAVENOUS at 08:11

## 2021-03-21 RX ADMIN — CEFAZOLIN SODIUM 2000 MG: 2 SOLUTION INTRAVENOUS at 10:50

## 2021-03-21 RX ADMIN — SODIUM CHLORIDE: 9 INJECTION, SOLUTION INTRAVENOUS at 20:42

## 2021-03-21 RX ADMIN — SODIUM CHLORIDE: 9 INJECTION, SOLUTION INTRAVENOUS at 08:04

## 2021-03-21 RX ADMIN — ALBUTEROL SULFATE 1 PUFF: 90 AEROSOL, METERED RESPIRATORY (INHALATION) at 09:36

## 2021-03-21 RX ADMIN — ALBUTEROL SULFATE 1 PUFF: 90 AEROSOL, METERED RESPIRATORY (INHALATION) at 09:33

## 2021-03-21 RX ADMIN — ALBUTEROL SULFATE 1 PUFF: 90 AEROSOL, METERED RESPIRATORY (INHALATION) at 09:35

## 2021-03-21 RX ADMIN — CEFAZOLIN SODIUM 2000 MG: 2 SOLUTION INTRAVENOUS at 17:05

## 2021-03-21 RX ADMIN — LIDOCAINE HYDROCHLORIDE 40 MG: 20 INJECTION, SOLUTION INTRAVENOUS at 08:08

## 2021-03-21 RX ADMIN — ROCURONIUM BROMIDE 40 MG: 10 SOLUTION INTRAVENOUS at 08:13

## 2021-03-21 RX ADMIN — ALBUTEROL SULFATE 1 PUFF: 90 AEROSOL, METERED RESPIRATORY (INHALATION) at 09:34

## 2021-03-21 RX ADMIN — ONDANSETRON 4 MG: 2 INJECTION INTRAMUSCULAR; INTRAVENOUS at 08:41

## 2021-03-21 RX ADMIN — DOCUSATE SODIUM 50 MG AND SENNOSIDES 8.6 MG 1 TABLET: 8.6; 5 TABLET, FILM COATED ORAL at 20:41

## 2021-03-21 RX ADMIN — Medication 0.6 MG: at 09:09

## 2021-03-21 RX ADMIN — LABETALOL HYDROCHLORIDE 5 MG: 5 INJECTION INTRAVENOUS at 10:17

## 2021-03-21 RX ADMIN — CLONAZEPAM 0.5 MG: 1 TABLET ORAL at 20:41

## 2021-03-21 RX ADMIN — WARFARIN SODIUM 10 MG: 10 TABLET ORAL at 17:05

## 2021-03-21 RX ADMIN — CEFAZOLIN SODIUM 2 G: 1 SOLUTION INTRAVENOUS at 08:35

## 2021-03-21 RX ADMIN — ACETAMINOPHEN 500 MG: 500 TABLET ORAL at 17:05

## 2021-03-21 ASSESSMENT — PULMONARY FUNCTION TESTS
PIF_VALUE: 17
PIF_VALUE: 20
PIF_VALUE: 2
PIF_VALUE: 20
PIF_VALUE: 7
PIF_VALUE: 20
PIF_VALUE: 20
PIF_VALUE: 1
PIF_VALUE: 20
PIF_VALUE: 20
PIF_VALUE: 4
PIF_VALUE: 28
PIF_VALUE: 18
PIF_VALUE: 21
PIF_VALUE: 17
PIF_VALUE: 16
PIF_VALUE: 20
PIF_VALUE: 17
PIF_VALUE: 20
PIF_VALUE: 21
PIF_VALUE: 11
PIF_VALUE: 19
PIF_VALUE: 18
PIF_VALUE: 20
PIF_VALUE: 4
PIF_VALUE: 20
PIF_VALUE: 18
PIF_VALUE: 3
PIF_VALUE: 19
PIF_VALUE: 20
PIF_VALUE: 20
PIF_VALUE: 14
PIF_VALUE: 20
PIF_VALUE: 18
PIF_VALUE: 20
PIF_VALUE: 19
PIF_VALUE: 20
PIF_VALUE: 3
PIF_VALUE: 19
PIF_VALUE: 20
PIF_VALUE: 20
PIF_VALUE: 1
PIF_VALUE: 19
PIF_VALUE: 2
PIF_VALUE: 3
PIF_VALUE: 20
PIF_VALUE: 2
PIF_VALUE: 4
PIF_VALUE: 16
PIF_VALUE: 2
PIF_VALUE: 8
PIF_VALUE: 2
PIF_VALUE: 5
PIF_VALUE: 17
PIF_VALUE: 19
PIF_VALUE: 20
PIF_VALUE: 19
PIF_VALUE: 5
PIF_VALUE: 4
PIF_VALUE: 41
PIF_VALUE: 20
PIF_VALUE: 21

## 2021-03-21 ASSESSMENT — PAIN SCALES - GENERAL: PAINLEVEL_OUTOF10: 0

## 2021-03-21 NOTE — OP NOTE
Operative Note      Patient: Steve Webb  YOB: 1930  MRN: [id number]    Date of Procedure: 3/21/2021    Pre-Op Diagnosis: Left femoral neck fracture    Post-Op Diagnosis: Same       Procedure(s):  LEFT HIP HEMIARTHROPLASTY [facility] NEPH)    Surgeon(s):  Leslee Austin MD    Assistant:   First Assistant: Chanel Downing; Afua Lamb RN    Anesthesia: General    Estimated Blood Loss (mL): less than 50     Complications: None    Specimens:   * No specimens in log *    Implants:  Implant Name Type Inv. Item Serial No.  Lot No. LRB No. Used Action   HEAD FEM [id number] NK L-3MM OXINIUM 12/14 TAPR  HEAD FEM [id number] NK L-3MM OXINIUM 12/14 TAPR  [facility] [address] [id number] Left 1 Implanted   LINER UPLR OD54MM [id number] ACET CO CHROME/UHMWPE TNDM  LINER UPLR OD54MM ID28MM ACET CO CHROME/UHMWPE TNDM  [facility] [employer] [id number] Left 1 Implanted   STEM FEM SZ 3 CCD 135DEG STD 12/14 TAPR TI HA CEMENTLESS  STEM FEM SZ 3 CCD 135DEG STD 12/14 TAPR TI HA CEMENTLESS  [facility] [id number] Left 1 Implanted         Drains:   Urethral Catheter (Active)   Urine Color [place] 03/21/21 0500   Urine Appearance Clear 03/21/21 0500   Output (mL) 550 mL 03/21/21 0446           Detailed Description of Procedure:     Procedure: The patient was brought into the Operating Room and placed in supine position on the operating room table. After proper induction of anesthetic, the patient was then placed in the lateral decubitus position and the patient secured. The hip and leg were then prepped and draped in the usual sterile fashion. An incision was made curvilinear over the top of the greater trochanter, taken down to the IT band. The IT band was then split and retracted using a Charnley retractor. The anterior third of the gluteus medius and minimus were elevated through an intratendinous incision and then retracted anteriorly with a Severino retractor.   Second Severino retractor was impacted into the pelvis superiorly. The capsule was then opened and anterior capsulectomy was done. The head removed from the acetabulum and measured. Any excess tissue was removed from the cup and trialed. The leg was place down in the leg bag. The neck cut was made and a femoral neck elevator use to present the proximal femur. The canal finder was used to penetrate the canal.  A box osteotome was used to open up the lateral canal.  The canal was opened using the canal finder and the canal was sequentially reamed to get a good cortical fit. Then the proximal femur was sequentially broached up to the appropriate size. A trial neck and ball was placed and reduced achieving good stability. The trial was removed, irrigated out the canal, press fit in the stem with good bony purchase. The bipolar was assembled and impacted on the top of the stem. The hip was reduced and was stable through a full range of motion. The hip was irrigated. The gluteus medius and minimus were reattached using a nonabsorbable suture and then closed the IT band in an interrupted fashion. The skin was closed with Vicryl and reinforced with zip line. Sterile aquacell dressing was applied. The patient was then placed back in a supine position with pillows between the knees. The patient was awakened and transported to [address] [place] [address]. in stable condition.            Electronically signed by Frida Westfall MD on 3/21/2021 at 9:43 AM

## 2021-03-21 NOTE — PROGRESS NOTES
Physical Therapy    Physical Therapy Initial Evaluation    Room #:  0313/0313-02  Patient Name: Marie Glover  YOB: 1930  MRN: [id number]    Referring Provider:   Alejandra Weaver MD     Date of Service: 3/21/2021    Evaluating Physical Therapist: Sabrina Be, PT  #[id number]       Diagnosis:   Left displaced femoral neck fracture ([address] [S72.002A]   D/t fall  S.p left hemiarthroplasty    Patient Active Problem List   Diagnosis    A-fib ([address]    Chronic anticoagulation    Hypertension    H/O prostate cancer    Orthostatic hypotension    Sinus bradycardia-tachycardia syndrome ([address]    Pacemaker    Hyperlipidemia    Vitamin B12 deficiency    Vitamin D deficiency    Closed fracture of multiple pubic rami, right, initial encounter ([address]    COPD (chronic obstructive pulmonary disease) ([address]    Acute bronchitis    Acute respiratory failure with hypoxia ([address]    Closed fracture of ramus of right pubis (HCC)    Back pain    Closed compression fracture of L1 lumbar vertebra    Sacral fracture, closed ([address]    Left displaced femoral neck fracture ([address]        Tentative placement recommendation: Subacute rehab    Equipment recommendation: To be determined      Prior Level of Function: Patient ambulated independently    Rehab Potential: good    for baseline    Past medical history:   Past Medical History:   Diagnosis Date    Atrial fibrillation ([address]     Cancer ([address]     prostate     Hyperlipidemia     Hypertension     Syncope and collapse      Past Surgical History:   Procedure Laterality Date    CARDIOVERSION  2/22/2010    [facility]ful [facility].  CARDIOVERSION  8/11/2011    Successful to NSR at [facility].     CATARACT REMOVAL WITH IMPLANT      COLONOSCOPY  09/19/2007    REDUNDANT COLON- [address]    DOPPLER ECHOCARDIOGRAPHY  08/29/2019    Dr Donna Negron abnormal left ventricular systolic function--mod severe tricuspid regurg w/mild pulmonary hyn--mild mitral, aortic, pulmonic Moderate assist of 1     Ambulation    not assessed      50 feet using  wheeled walker with Minimal assist of 1    Stair negotiation: ascended and descended   Not assessed       3 steps with rail  Min a   ROM Within functional limits       Strength BUE:  2/5  RLE:  2/5  LLE:  2/5   Increase strength in affected mm groups by 1/3 grade   Balance Sitting EOB:  not assessed    Dynamic Standing:  not assessed    Sitting EOB:  good    Dynamic Standing: fair       Patient is   & Oriented x person and follows one step directions drowsy  Sensation:  Patient  denies numbness and tingling     Edema:  yes left lower extremity    Endurance: poor         Patient education  Patient educated on role of Physical Therapy, risks of immobility, safety and plan of care,  importance of mobility while in hospital , hip precautions and weight bearing status       Patient response to education:   Pt verbalized understanding Pt demonstrated skill Pt requires further education in this area   No No Yes      Treatment:  Patient practiced and was instructed/facilitated in the following treatment: Patient   placed in chair position for exercises and stimulation       Therapeutic Exercises:  ankle pumps, heel slide, hip abduction/adduction and long arc quad  x 5-10 reps. A/a    At end of session, patient in bed with alarm call light and phone within reach,   all lines and tubes intact, nursing notified. Patient would benefit from continued skilled Physical Therapy to improve functional independence and quality of life.           Patient's/ family goals   none stated        ASSESSMENT: Patient exhibits decreased strength, balance, endurance, range of motion and pain left lower extremity impairing functional mobility, transfers, gait , tolerance to activity and participation  Drowsy this session d/t anesthesia; family limiting narcotics d/t drowsiness and sensorium; wife very involved in care and will be an asset to his recovery     Plan of Care:     -Bed Mobility: Lower extremity exercises , Upper extremity exercises  and Trunk control activities   -Sitting Balance: Incorporate reaching activities to activate trunk muscles , Hands on support to maintain midline  and Facilitate postural control in all planes   -Standing Balance: Perform strengthening exercises in standing to promote motor control with or without upper extremity support  and Instruct patient on adequate base of support to maintain balance  -Transfers: Provide instruction on proper hand and foot position for adequate transfer of weight onto lower extremities and use of gait device, Cues for hand placement, technique and safety, Facilitate weight shift forward on to lower extremities and provide necessary stabilization of bilateral lower extremities , Support transfer of weight on to lower extremities, Assist with extension of knees trunk and hip to accept weight transfer  and Provide stabilization to prevent fall   -Gait: Gait training, Standing activities to improve: base of support, weight shift, weight bearing , Exercises to improve trunk control and Exercises to improve hip and knee control   -Endurance: Utilize Supervised activities to increase level of endurance to allow for safe functional mobility including transfers and gait     Patient and or family understand(s) diagnosis, prognosis, and plan of care. Frequency of treatments: Patient will be seen  twice daily. Time in  324  Time out  347    Total Treatment Time  15 minutes    Evaluation time includes thorough review of current medical information, gathering information on past medical history/social history and prior level of function, completion of standardized testing/informal observation of tasks, assessment of data, and development of Plan of care and goals.      CPT codes:  Low Complexity PT evaluation (14058)  Therapeutic exercises (95932)   15 minutes  1 unit(s)    Michelle Hauser, PT

## 2021-03-21 NOTE — PLAN OF CARE
Problem: Pain:  Goal: Pain level will decrease  Description: Pain level will decrease  Outcome: Met This Shift  Goal: Control of acute pain  Description: Control of acute pain  Outcome: Met This Shift  Goal: Control of chronic pain  Description: Control of chronic pain  Outcome: Met This Shift     Problem: Falls - Risk of:  Goal: Will remain free from falls  Description: Will remain free from falls  Outcome: Met This Shift  Goal: Absence of physical injury  Description: Absence of physical injury  Outcome: Met This Shift     Problem: SAFETY  Goal: Free from accidental physical injury  Outcome: Met This Shift     Problem: DAILY CARE  Goal: Daily care needs are met  Outcome: Met This Shift     Problem: SKIN INTEGRITY  Goal: Skin integrity is maintained or improved  Outcome: Met This Shift     Problem: KNOWLEDGE DEFICIT  Goal: Patient/S.O. demonstrates understanding of disease process, treatment plan, medications, and discharge instructions.   Outcome: Met This Shift     Problem: DISCHARGE BARRIERS  Goal: Patient's continuum of care needs are met  Outcome: Met This Shift

## 2021-03-21 NOTE — PROGRESS NOTES
Intravenous, PRN, Zee Flanagan MD    perflutren lipid microspheres (DEFINITY) injection 1.65 mg, 1.5 mL, Intravenous, ONCE PRN, Pink Hazy.  MIRNA Pike    atorvastatin (LIPITOR) tablet 10 mg, 10 mg, Oral, Every Other Day, Zee Flanagan MD, Stopped at 03/19/21 1501    clonazePAM (KLONOPIN) tablet 0.5 mg, 0.5 mg, Oral, Daily, Zee Flanagan MD, 0.5 mg at 03/20/21 2038    lactated ringers infusion, , Intravenous, Continuous, [facility], Last Rate: 50 mL/hr at 03/19/21 1532, Restarted at 03/19/21 1532    sodium chloride flush 0.9 % injection 10 mL, 10 mL, Intravenous, 2 times per day, Zee Flanagan MD, 10 mL at 03/20/21 2042    sodium chloride flush 0.9 % injection 10 mL, 10 mL, Intravenous, PRN, Zee Flanagan MD    enoxaparin (LOVENOX) injection 40 mg, 40 mg, Subcutaneous, Daily, Zee Flanagan MD, Stopped at 03/19/21 1519    promethazine (PHENERGAN) tablet 12.5 mg, 12.5 mg, Oral, Q6H PRN **OR** ondansetron (ZOFRAN) injection 4 mg, 4 mg, Intravenous, Q6H PRN, Zee Flanagan MD    polyethylene glycol [facility]) packet 17 g, 17 g, Oral, Daily PRN, Zee Flanagan MD    traMADol [place]) tablet 50 mg, 50 mg, Oral, Q6H PRN **OR** traMADol (ULTRAM) tablet 100 mg, 100 mg, Oral, Q6H PRN, Zee Flanagan MD    morphine (PF) injection 2 mg, 2 mg, Intravenous, Q2H PRN, Zee Flanagan MD, 2 mg at 03/19/21 1621    acetaminophen (TYLENOL) tablet 500 mg, 500 mg, Oral, Q6H PRN, Allie Garcia MD, 500 mg at 03/20/21 1758    Facility-Administered Medications Ordered in Other Encounters:     propofol injection, , , PRN, CAPRICE Aponte CRNA, 80 mg at 03/21/21 3120    lidocaine (cardiac) (XYLOCAINE) injection, , , PRN, CAPRICE Aponte CRNA, 40 mg at 03/21/21 4432    fentaNYL (SUBLIMAZE) injection, , , PRN, CAPRICE Aponte CRNA, 50 mcg at 03/21/21 0808    rocuronium (ZEMURON) injection, , , PRN, Lili Alvarez Cara OlivaresCAPRICE - CRNA, 40 mg at 03/21/21 0813    0.9 % sodium chloride infusion, , , Continuous PRN, Aleta Olivares APRN - CRNA, New Bag at 03/21/21 0804    ceFAZolin (ANCEF) 1000 mg in dextrose 4 % 50 mL IVPB (duplex, , , PRN, Aleta Olivares APRN - CRNA, 2 g at 03/21/21 0835    ondansetron (ZOFRAN) injection, , , PRN, Aleta Olivares APRN - CRNA, 4 mg at 03/21/21 5758      ALLERGIES:  Patient has no known allergies. REVIEW OF SYSTEMS:     Limited due to patient being sleep post-operative hip replacement, but denies chest pain or shortness of breath. PHYSICAL EXAM:   /65   Pulse 81   Temp 98.9 °F (37.2 °C) (Oral)   Resp 20   Ht 5' 6\" (1.676 m)   Wt 152 lb 4.8 oz (69.1 kg)   SpO2 93%   BMI 24.58 kg/m²   CONST:  Well developed, well nourished WM who appears stated age. Sleepy but alert, cooperative, no apparent distress. HEENT:   Head- Normocephalic, atraumatic. Eyes- Conjunctivae pink, anicteric. Neck-  No stridor, trachea midline, no jugular venous distention. CHEST: Chest symmetrical and non-tender to palpation. No accessory muscle use or intercostal retractions. RESPIRATORY: Lung sounds - clear throughout fields. No wheezing, rales or rhonchi. Diminished. CARDIOVASCULAR:     Heart Inspection- shows no noted pulsations. Heart Ausculation- Irregularly irregular rhythm with controlled rate, 2/6 systolic murmur  PV: Trace lower extremity edema. Pedal pulses palpable, no clubbing or cyanosis. ABDOMEN: Soft, non-tender to light palpation. Bowel sounds present. MS: Good muscle strength and tone. No atrophy or abnormal movements. SKIN: Warm and dry. NEURO / PSYCH: Oriented to person, place and time. Speech clear and appropriate. Follows all commands. Pleasant affect. DATA:    Telemetry: No currently on telemetry at this time    Diagnostic:  All diagnostic testing and lab work thus far this admission reviewed in detail.     L

## 2021-03-21 NOTE — H&P
Department of Orthopedic Surgery  Attending History and Physical        CHIEF COMPLAINT:  Left hip pain    Reason for Admission:  Hip fracture    History Obtained From:  patient    HISTORY OF PRESENT ILLNESS:      The patient is a 80 y.o. male with significant past medical history of fall who presents with a femoral neck fracture. He was on a stool getting a bowl and suffered a mechanical fall. No LOC. He couldn't get up so he came to the er via ambulance    Past Medical History:        Diagnosis Date    Atrial fibrillation ([address]     Cancer ([address]     prostate     Hyperlipidemia     Hypertension     Syncope and collapse      Past Surgical History:        Procedure Laterality Date    CARDIOVERSION  2/22/2010    Successful to [facility]R at [facility].  CARDIOVERSION  8/11/2011    [facility] to [facility] at [facility].  CATARACT REMOVAL WITH IMPLANT      COLONOSCOPY  09/19/2007    REDUNDANT COLON- [address]    DOPPLER ECHOCARDIOGRAPHY  08/29/2019    Dr Joneen Severin abnormal left ventricular systolic function--mod severe tricuspid regurg w/mild pulmonary hyn--mild mitral, aortic, pulmonic regurg.  EYE SURGERY      CATARACT    PACEMAKER PLACEMENT Left 2-8-2013    Dual chamber-Dr. Zulema Rizzo    PROSTATECTOMY      20 years ago    PROSTATECTOMY  02/01/1988    RETROPUBIC RADICAL WITH/WO NERVE SPARING     Medications Prior to Admission:   Medications Prior to Admission: clonazePAM (KLONOPIN) 0.5 MG tablet, TAKE ONE TABLET BY MOUTH NIGHTLY AS NEEDED FOR ANXIETY FOR UP TO 30 DAYS  atorvastatin (LIPITOR) 10 MG tablet, Take 1 tablet by mouth every other day    Allergies:  Patient has no known allergies. Social History:   Non smoker.   Live independently with his wife  Family History:       Problem Relation Age of Onset    Heart Surgery Other      PHYSICAL EXAM:  VITALS:  /65   Pulse 81   Temp 98.9 °F (37.2 °C) (Oral)   Resp 20   Ht 5' 6\" (1.676 m)   Wt 152 lb 4.8 oz (69.1 kg)   SpO2 93%   BMI 24.58 kg/m² CONSTITUTIONAL:  awake, alert, cooperative, no apparent distress, and appears stated age  LUNGS:  cta good breaht sounds. No crackles or wrinkles  CARDIOVASCULAR:  RRR, no murmurs. Mild edema, good pulse in ue and le. ABDOMEN:  No scars, normal bowel sounds, soft, non-distended, non-tender, no masses palpated, no hepatosplenomegally  MUSCULOSKELETAL:  Left leg shortened er. Pain with log roll,  No pain in ue or right leg. DATA:  Radiology Review:  Left femoral neck fracture    ASSESSMENT AND PLAN:    Left femoral neck fracture. Plan on hemiarthroplasty. discusessed with the patient and family. Discussed risk, benefits and complications which include but not limited to pain. Leg length discrepancy, dvt, pe, limp. Infection, dislocation and medical compromise. Consult medicine and cardiology. Reverse coumadin.

## 2021-03-21 NOTE — PROGRESS NOTES
Admitting Date and Time: 3/19/2021 11:11 AM  Admit Dx: Left displaced femoral neck fracture ([address] [S72.002A]    Subjective:  Seen postoperatively. Tired weak. Daughter by bedside. No specific complaints   Per RN: no issues    ROS: denies fever, chills, cp, sob, n/v, HA unless stated above.      atorvastatin  10 mg Oral Every Other Day    clonazePAM  0.5 mg Oral Daily    sodium chloride flush  10 mL Intravenous 2 times per day    enoxaparin  40 mg Subcutaneous Daily     HYDROmorphone, 0.25 mg, Q5 Min PRN  HYDROmorphone, 0.5 mg, Q5 Min PRN  HYDROmorphone, 0.25 mg, Q5 Min PRN  HYDROmorphone, 0.5 mg, Q5 Min PRN  oxyCODONE-acetaminophen, 1 tablet, Q4H PRN  diphenhydrAMINE, 12.5 mg, Once PRN  prochlorperazine, 5 mg, Once PRN  promethazine, 6.25 mg, Once PRN  labetalol, 5 mg, Q10 Min PRN  meperidine, 12.5 mg, Q5 Min PRN  sodium chloride, , PRN  perflutren lipid microspheres, 1.5 mL, ONCE PRN  sodium chloride flush, 10 mL, PRN  promethazine, 12.5 mg, Q6H PRN    Or  ondansetron, 4 mg, Q6H PRN  polyethylene glycol, 17 g, Daily PRN  traMADol, 50 mg, Q6H PRN    Or  traMADol, 100 mg, Q6H PRN  morphine, 2 mg, Q2H PRN  acetaminophen, 500 mg, Q6H PRN         Objective:    /65   Pulse 81   Temp 98.9 °F (37.2 °C) (Oral)   Resp 20   Ht 5' 6\" (1.676 m)   Wt 152 lb 4.8 oz (69.1 kg)   SpO2 93%   BMI 24.58 kg/m²   General Appearance: alert and oriented to person, place and time and in no acute distress  Skin: warm and dry  Head: normocephalic and atraumatic  Eyes: pupils equal, round, and reactive to light, extraocular eye movements intact, conjunctivae normal  Neck: neck supple and non tender without mass   Pulmonary/Chest: clear to auscultation bilaterally- no wheezes, rales or rhonchi, normal air movement, no respiratory distress  Cardiovascular: normal rate, normal S1 and S2 and no carotid bruits  Abdomen: soft, non-tender, non-distended, normal bowel sounds, no masses or organomegaly  Extremities: no cyanosis, no clubbing and no  edema  Neurologic: no cranial nerve deficit and speech normal, mildly confused. Thick accent      Recent Labs     03/19/21  1243      K 3.9      CO2 26   BUN 20   CREATININE 0.9   GLUCOSE 101*   CALCIUM 9.1       No results for input(s): ALKPHOS, PROT, LABALBU, BILITOT, AST, ALT in the last 72 hours. Recent Labs     03/19/21  1127 03/20/21  0428   WBC 6.4 10.6   RBC 4.37 4.05   HGB 13.8 12.8   HCT 43.1 39.6   MCV 98.6 97.8   MCH 31.6 31.6   MCHC 32.0 32.3   RDW 14.8 14.5    153   MPV 11.7 10.5           Radiology:   CT Head WO Contrast   Final Result   No acute intracranial abnormality. CT Cervical Spine WO Contrast   Final Result   1. There is no acute compression fracture or subluxation of the cervical   spine. 2. Multilevel degenerative disc and degenerative joint disease. .         XR HIP LEFT (2-3 VIEWS)   Final Result   Left femoral neck fracture         XR CHEST 1 VIEW   Final Result   No acute cardiopulmonary abnormality. Assessment:  Active Problems:    Left displaced femoral neck fracture (HCC)  Resolved Problems:    * No resolved hospital problems. *      Plan:  History of present illness from History and Physical:  80 y.o. male with a history of a fib on Coumadin, prostate cancer hyperlipidemia hypertension and previous episodes of syncope with permanent pacemaker for complete heart block presents with left hip fracture. He was reaching for something in his kitchen when he was on a stool. He remembers falling. He could be a difficult historian but careful history reveals that he actually had no prior syncopal or presymptom fall. He speculates about having dizziness but clearly upon requestioning says that he did not have any prefall symptoms  During the history his daughter entered the room.       1. Left hip fracture surgery earlier today by primary orthopedic surgery. Bmp cbc ordered daily for 3/21 and 3/22  2.  Heart disease including

## 2021-03-22 ENCOUNTER — APPOINTMENT (OUTPATIENT)
Dept: GENERAL RADIOLOGY | Age: 86
DRG: 522 | End: 2021-03-22
Payer: MEDICARE

## 2021-03-22 LAB
ANION GAP SERPL CALCULATED.3IONS-SCNC: 10 MMOL/L (ref 7–16)
BUN BLDV-MCNC: 30 MG/DL (ref 8–23)
CALCIUM SERPL-MCNC: 8.2 MG/DL (ref 8.6–10.2)
CHLORIDE BLD-SCNC: 106 MMOL/L (ref 98–107)
CO2: 25 MMOL/L (ref 22–29)
CREAT SERPL-MCNC: 1 MG/DL (ref 0.7–1.2)
GFR AFRICAN AMERICAN: >60
GFR NON-AFRICAN AMERICAN: >60 ML/MIN/1.73
GLUCOSE BLD-MCNC: 128 MG/DL (ref 74–99)
HCT VFR BLD CALC: 33.4 % (ref 37–54)
HEMOGLOBIN: 10.5 G/DL (ref 12.5–16.5)
INR BLD: 1.6
LV EF: 55 %
LVEF MODALITY: NORMAL
POTASSIUM SERPL-SCNC: 3.8 MMOL/L (ref 3.5–5)
PROTHROMBIN TIME: 18.6 SEC (ref 9.3–12.4)
SODIUM BLD-SCNC: 141 MMOL/L (ref 132–146)

## 2021-03-22 PROCEDURE — 92611 MOTION FLUOROSCOPY/SWALLOW: CPT | Performed by: SPEECH-LANGUAGE PATHOLOGIST

## 2021-03-22 PROCEDURE — 1200000000 HC SEMI PRIVATE

## 2021-03-22 PROCEDURE — 92610 EVALUATE SWALLOWING FUNCTION: CPT | Performed by: SPEECH-LANGUAGE PATHOLOGIST

## 2021-03-22 PROCEDURE — 85018 HEMOGLOBIN: CPT

## 2021-03-22 PROCEDURE — 93306 TTE W/DOPPLER COMPLETE: CPT

## 2021-03-22 PROCEDURE — 97530 THERAPEUTIC ACTIVITIES: CPT | Performed by: PHYSICAL THERAPIST

## 2021-03-22 PROCEDURE — 2500000003 HC RX 250 WO HCPCS: Performed by: INTERNAL MEDICINE

## 2021-03-22 PROCEDURE — U0003 INFECTIOUS AGENT DETECTION BY NUCLEIC ACID (DNA OR RNA); SEVERE ACUTE RESPIRATORY SYNDROME CORONAVIRUS 2 (SARS-COV-2) (CORONAVIRUS DISEASE [COVID-19]), AMPLIFIED PROBE TECHNIQUE, MAKING USE OF HIGH THROUGHPUT TECHNOLOGIES AS DESCRIBED BY CMS-2020-01-R: HCPCS

## 2021-03-22 PROCEDURE — 85610 PROTHROMBIN TIME: CPT

## 2021-03-22 PROCEDURE — 71045 X-RAY EXAM CHEST 1 VIEW: CPT

## 2021-03-22 PROCEDURE — 80048 BASIC METABOLIC PNL TOTAL CA: CPT

## 2021-03-22 PROCEDURE — 2580000003 HC RX 258: Performed by: ORTHOPAEDIC SURGERY

## 2021-03-22 PROCEDURE — U0005 INFEC AGEN DETEC AMPLI PROBE: HCPCS

## 2021-03-22 PROCEDURE — 99232 SBSQ HOSP IP/OBS MODERATE 35: CPT | Performed by: INTERNAL MEDICINE

## 2021-03-22 PROCEDURE — 36415 COLL VENOUS BLD VENIPUNCTURE: CPT

## 2021-03-22 PROCEDURE — 85014 HEMATOCRIT: CPT

## 2021-03-22 PROCEDURE — 74230 X-RAY XM SWLNG FUNCJ C+: CPT

## 2021-03-22 PROCEDURE — 6370000000 HC RX 637 (ALT 250 FOR IP): Performed by: ORTHOPAEDIC SURGERY

## 2021-03-22 PROCEDURE — 2580000003 HC RX 258: Performed by: INTERNAL MEDICINE

## 2021-03-22 PROCEDURE — 97110 THERAPEUTIC EXERCISES: CPT | Performed by: PHYSICAL THERAPIST

## 2021-03-22 RX ORDER — WARFARIN SODIUM 4 MG/1
4 TABLET ORAL EVERY OTHER DAY
Qty: 45 TABLET | Refills: 1 | Status: SHIPPED | OUTPATIENT
Start: 2021-03-22 | End: 2021-06-24 | Stop reason: SDUPTHER

## 2021-03-22 RX ORDER — TRAMADOL HYDROCHLORIDE 50 MG/1
50 TABLET ORAL EVERY 6 HOURS PRN
Qty: 30 TABLET | Refills: 0 | Status: SHIPPED | OUTPATIENT
Start: 2021-03-22 | End: 2021-03-25

## 2021-03-22 RX ORDER — WARFARIN SODIUM 5 MG/1
5 TABLET ORAL EVERY OTHER DAY
Status: DISCONTINUED | OUTPATIENT
Start: 2021-03-22 | End: 2021-03-26 | Stop reason: HOSPADM

## 2021-03-22 RX ORDER — WARFARIN SODIUM 4 MG/1
4 TABLET ORAL EVERY OTHER DAY
Status: DISCONTINUED | OUTPATIENT
Start: 2021-03-23 | End: 2021-03-26 | Stop reason: HOSPADM

## 2021-03-22 RX ORDER — WARFARIN SODIUM 5 MG/1
5 TABLET ORAL EVERY OTHER DAY
Qty: 45 TABLET | Refills: 1 | Status: SHIPPED | OUTPATIENT
Start: 2021-03-22 | End: 2021-09-01

## 2021-03-22 RX ADMIN — Medication 10 ML: at 19:54

## 2021-03-22 RX ADMIN — BARIUM SULFATE 45 G: 0.81 POWDER, FOR SUSPENSION ORAL at 14:44

## 2021-03-22 RX ADMIN — WARFARIN SODIUM 5 MG: 5 TABLET ORAL at 18:47

## 2021-03-22 RX ADMIN — BARIUM SULFATE 45 ML: 400 SUSPENSION ORAL at 14:45

## 2021-03-22 RX ADMIN — TRAMADOL HYDROCHLORIDE 50 MG: 50 TABLET, FILM COATED ORAL at 05:45

## 2021-03-22 RX ADMIN — BARIUM SULFATE 45 G: 0.6 CREAM ORAL at 14:45

## 2021-03-22 RX ADMIN — SODIUM CHLORIDE: 9 INJECTION, SOLUTION INTRAVENOUS at 18:49

## 2021-03-22 ASSESSMENT — PAIN SCALES - GENERAL: PAINLEVEL_OUTOF10: 0

## 2021-03-22 NOTE — PROGRESS NOTES
Laryngeal elevation demonstrated partial superior movement of the thyroid cartilage with partial approximation of the arytenoids to the epiglottic petiole. Anterior hyoid excursion demonstrated partial anterior movement. Epiglottic movement resulted in partial inversion. Laryngeal vestibule closure was incomplete, as indicated by a narrow column of air or contrast within the laryngeal vestibule at the height of the swallow. Pharyngeal stripping wave was present but diminished. Pharyngeal contraction could not be determined due to logistical reasons not related to physiologic impairment. Pharyngoesophageal segment opening was completely distended for complete duration with no obstruction of bolus flow. Tongue base retraction allowed a wide collection of residue between the retracted tongue base and the posterior pharyngeal wall. A moderate collection of residue remained within or on the pharyngeal structures which resulted in persistent trickle down/spill over penetration. Esophageal clearance in the upright position could not be assessed due to logistical reasons not related to physiologic impairment. LARYNGEAL PENETRATION/ASPIRATION          PENETRATION-ASPIRATION SCALE (PAS):   TEXTURE PAS Score   Thin Liquid 8. Material enters the airway, passes below the vocal folds, and no effort is made to eject. Nectar (Mildly Thick) Consistency 3. Materials enters the airway, remains above the vocal folds, and is not ejected from the airway. Honey (moderately Thick) Consistency N/A   Pureed Solids 2. Material enters the airway, remains above the vocal folds, and is ejected from the airway. Cookies N/A        Worse: 8. Material enters the airway, passes below the vocal folds, and no effort is made to eject. during presentations of thin trace amounts       Aspiration occurred AFTER the swallow for thin liquid due to pharyngeal residuals and residuals in the laryngeal vestibule .  Aspiration was minimal and occurred consistently . an absent cough/throat clear was noted    Current Respiratory Status   room air         COMPENSATORY STRATEGIES       Compensatory strategies that were beneficial included double swallow cued cough       STRUCTURAL/FUNCTIONAL ANOMALIES       No structural/functional anomalies were noted    CERVICAL ESOPHAGEAL STAGE :        The cervical esophagus appeared adequate                                 The Speech Language Pathologist (SLP) completed education with the patient regarding results of evaluation. Explained that Speech Pathology intervention is warranted  at this time   Prognosis for improvements is good     This plan will be re-evaluated and revised in 1 week  if warranted. Patient stated goals: Agreed with above,   Treatment goals discussed with Patient   The Patient did not demonstrate understanding of the diagnosis, prognosis and plan of care     CPT code:  96787  dysphagia study    Evaluation time includes  review of current medical information, gathering information on past medical history/social history and prior level of function, completion of standardized testing/informal observation of tasks, assessment of data, and development of POC/Goals. [x]The admitting diagnosis and active problem list, as listed below have been reviewed prior to initiation of this evaluation.      ADMITTING DIAGNOSIS: Left displaced femoral neck fracture ([facility] [S72.002A]     ACTIVE PROBLEM LIST:   Patient Active Problem List   Diagnosis    A-fib ([facility]    Chronic anticoagulation    Hypertension    H/O prostate cancer    Orthostatic hypotension    Sinus bradycardia-tachycardia syndrome ([facility]    Pacemaker    Hyperlipidemia    Vitamin B12 deficiency    Vitamin D deficiency    Closed fracture of multiple pubic rami, right, initial encounter ([facility]    COPD (chronic obstructive pulmonary disease) (HCC)    Acute bronchitis    Acute respiratory failure with hypoxia (HCC)    Closed fracture of ramus of right pubis (HCC)    Back pain    Closed compression fracture of L1 lumbar vertebra    Sacral fracture, closed ([address]    Left displaced femoral neck fracture ([address]       Janny Al MSCCC/SLP  Speech Language Pathologist  [id number]

## 2021-03-22 NOTE — PROGRESS NOTES
Department of Internal Medicine  General Internal Medicine  Attending Progress Note      SUBJECTIVE:    Patient sleepy during evaluation. No fever or chills documented. No diarrhea. Able to respond with one or two word answers. No sign of weakness or droop on any side of the body.      OBJECTIVE      Medications    Current Facility-Administered Medications: warfarin (COUMADIN) tablet 5 mg, 5 mg, Oral, Every Other Day  [START ON 3/23/2021] warfarin (COUMADIN) tablet 4 mg, 4 mg, Oral, Every Other Day  morphine (PF) injection 2 mg, 2 mg, Intravenous, Q4H PRN  0.9 % sodium chloride infusion, , Intravenous, Continuous  sodium chloride flush 0.9 % injection 10 mL, 10 mL, Intravenous, 2 times per day  sodium chloride flush 0.9 % injection 10 mL, 10 mL, Intravenous, PRN  sennosides-docusate sodium (SENOKOT-S) 8.6-50 MG tablet 1 tablet, 1 tablet, Oral, BID  magnesium hydroxide (MILK OF MAGNESIA) 400 MG/5ML suspension 30 mL, 30 mL, Oral, Daily PRN  perflutren lipid microspheres (DEFINITY) injection 1.65 mg, 1.5 mL, Intravenous, ONCE PRN  atorvastatin (LIPITOR) tablet 10 mg, 10 mg, Oral, Every Other Day  clonazePAM (KLONOPIN) tablet 0.5 mg, 0.5 mg, Oral, Daily  promethazine (PHENERGAN) tablet 12.5 mg, 12.5 mg, Oral, Q6H PRN **OR** ondansetron (ZOFRAN) injection 4 mg, 4 mg, Intravenous, Q6H PRN  polyethylene glycol (GLYCOLAX) packet 17 g, 17 g, Oral, Daily PRN  traMADol (ULTRAM) tablet 50 mg, 50 mg, Oral, Q6H PRN **OR** traMADol (ULTRAM) tablet 100 mg, 100 mg, Oral, Q6H PRN  acetaminophen (TYLENOL) tablet 500 mg, 500 mg, Oral, Q6H PRN  Physical    VITALS:  BP (!) 142/71   Pulse 81   Temp 98.5 °F (36.9 °C) (Oral)   Resp 18   Ht 5' 6\" (1.676 m)   Wt 159 lb (72.1 kg)   SpO2 93%   BMI 25.66 kg/m²   CONSTITUTIONAL:  awake, alert, cooperative, no apparent distress, and appears stated age  EYES:  Lids and lashes normal, pupils equal, round and reactive to light, extra ocular muscles intact, sclera clear, conjunctiva normal  ENT: Normocephalic, without obvious abnormality, atraumatic, sinuses nontender on palpation, external ears without lesions, oral pharynx with moist mucus membranes, tonsils without erythema or exudates, gums normal and good dentition. NECK:  Supple, symmetrical, trachea midline, no adenopathy, thyroid symmetric, not enlarged and no tenderness, skin normal  HEMATOLOGIC/LYMPHATICS:  no cervical lymphadenopathy  LUNGS:  No increased work of breathing, good air exchange, clear to auscultation bilaterally, no crackles or wheezing  CARDIOVASCULAR:  Normal apical impulse, regular rate and rhythm, normal S1 and S2, no S3 or S4, and no murmur noted  ABDOMEN:  No scars, normal bowel sounds, soft, non-distended, non-tender, no masses palpated, no hepatosplenomegally  MUSCULOSKELETAL:  There is no redness, warmth, or swelling of the joints. Full range of motion noted. Motor strength is 5 out of 5 all extremities bilaterally. Tone is normal.  NEUROLOGIC:  No focal neuro deficit  SKIN:  no bruising or bleeding  Data    CBC:   Lab Results   Component Value Date    WBC 10.6 03/20/2021    RBC 4.05 03/20/2021    HGB 10.5 03/22/2021    HCT 33.4 03/22/2021    MCV 97.8 03/20/2021    MCH 31.6 03/20/2021    MCHC 32.3 03/20/2021    RDW 14.5 03/20/2021     03/20/2021    MPV 10.5 03/20/2021     CMP:    Lab Results   Component Value Date     03/22/2021    K 3.8 03/22/2021    K 3.9 03/19/2021     03/22/2021    CO2 25 03/22/2021    BUN 30 03/22/2021    CREATININE 1.0 03/22/2021    GFRAA >60 03/22/2021    LABGLOM >60 03/22/2021    GLUCOSE 128 03/22/2021    GLUCOSE 115 03/05/2012    PROT 7.4 01/25/2018    LABALBU 3.8 08/08/2019    LABALBU 4.0 03/05/2012    CALCIUM 8.2 03/22/2021    BILITOT 1.1 08/08/2019    ALKPHOS 82 08/08/2019    AST 19 08/08/2019    ALT 9 08/08/2019       ASSESSMENT AND PLAN        1. Left displaced femoral neck fracture (HCC)  PT/OT  Continue to monitor  Continue current dose of coumadin.   INR is currently on therapeutic level    2. Hyperlipidemia: on statin    Patient currently NPO, Will decrease IV fluid rate to 75 cc to avoid overloading  ST to evaluate patient for concern for dysphagia.  Will continue to monitor

## 2021-03-22 NOTE — PLAN OF CARE
Problem: Pain:  Goal: Pain level will decrease  Description: Pain level will decrease  Outcome: Met This Shift  Goal: Control of acute pain  Description: Control of acute pain  Outcome: Met This Shift  Goal: Control of chronic pain  Description: Control of chronic pain  Outcome: Met This Shift     Problem: Falls - Risk of:  Goal: Will remain free from falls  Description: Will remain free from falls  Outcome: Met This Shift  Goal: Absence of physical injury  Description: Absence of physical injury  Outcome: Met This Shift     Problem: SAFETY  Goal: Free from accidental physical injury  Outcome: Met This Shift     Problem: DAILY CARE  Goal: Daily care needs are met  Outcome: Met This Shift     Problem: SKIN INTEGRITY  Goal: Skin integrity is maintained or improved  Outcome: Met This Shift     Problem: DISCHARGE BARRIERS  Goal: Patient's continuum of care needs are met  Outcome: Met This Shift     Problem: KNOWLEDGE DEFICIT  Goal: Patient/S.O. demonstrates understanding of disease process, treatment plan, medications, and discharge instructions.   Outcome: Not Met This Shift

## 2021-03-22 NOTE — PROGRESS NOTES
Inpatient Cardiology Progress Note    Date of Service: 3/22/2021    Reason for Follow-up: Perioperative cardiac evaluation, PAF    SUBJECTIVE:   Patient is known to Dr. Julia Hastings and Dr. Zainab Moreira. No current cardiac complaints of chest pain, respiratory distress, or palpitations. No new cardiac events overnight. ROS:  Cardiac: As per HPI  General: No fever, chills  Pulmonary: As per HPI  HEENT: No visual disturbances, difficult swallowing  GI: No nausea, vomiting  : No dysuria, hematuria  Endocrine: No thyroid disease or DM  Musculoskeletal: CHICAS x 4, no focal motor deficits  Skin: Intact, no rashes  Neuro: No headache, seizures  Psych: Currently with no depression, anxiety    HOME MEDICATIONS:  Prior to Admission medications    Medication Sig Start Date End Date Taking? Authorizing Provider   traMADol (ULTRAM) 50 MG tablet Take 1 tablet by mouth every 6 hours as needed for Pain for up to 3 days.  3/22/21 3/25/21 Yes Benedicto Murillo MD   warfarin (COUMADIN) 4 MG tablet Take 1 tablet by mouth every other day Alternating with 5 mg 3/22/21  Yes Benedicto Murillo MD   warfarin (COUMADIN) 5 MG tablet Take 1 tablet by mouth every other day Alternating with 4 mg 3/22/21  Yes Benedicto Murillo MD   clonazePAM (KLONOPIN) 0.5 MG tablet TAKE ONE TABLET BY MOUTH NIGHTLY AS NEEDED FOR ANXIETY FOR UP TO 30 DAYS 1/25/21 4/20/21  Liyah Horn MD   atorvastatin (LIPITOR) 10 MG tablet Take 1 tablet by mouth every other day 9/2/20   Liyah Horn MD       CURRENT MEDICATIONS:      Current Facility-Administered Medications:     warfarin (COUMADIN) tablet 5 mg, 5 mg, Oral, Every Other Day, Benedicto Murillo MD    [START ON 3/23/2021] warfarin (COUMADIN) tablet 4 mg, 4 mg, Oral, Every Other Day, Benedicto Murillo MD    morphine (PF) injection 2 mg, 2 mg, Intravenous, Q4H PRN, Benedicto Murillo MD    0.9 % sodium chloride infusion, , Intravenous, Continuous, Valeriano Sena MD, Last Rate: 100 mL/hr at 03/21/21 2042, New Bag at 03/21/21 2042    sodium chloride flush 0.9 % injection 10 mL, 10 mL, Intravenous, 2 times per day, Philip Arndt MD    sodium chloride flush 0.9 % injection 10 mL, 10 mL, Intravenous, PRN, Philip Arndt MD    sennosides-docusate sodium (SENOKOT-S) 8.6-50 MG tablet 1 tablet, 1 tablet, Oral, BID, Philip Arndt MD, 1 tablet at 03/21/21 2041    magnesium hydroxide (MILK OF MAGNESIA) 400 MG/5ML suspension 30 mL, 30 mL, Oral, Daily PRN, Philip Arndt MD    perflutren lipid microspheres (DEFINITY) injection 1.65 mg, 1.5 mL, Intravenous, ONCE PRN, Philip Arndt MD    atorvastatin (LIPITOR) tablet 10 mg, 10 mg, Oral, Every Other Day, Philip Arndt MD, Stopped at 03/19/21 1501    clonazePAM (KLONOPIN) tablet 0.5 mg, 0.5 mg, Oral, Daily, Philip Arndt MD, 0.5 mg at 03/21/21 2041    promethazine (PHENERGAN) tablet 12.5 mg, 12.5 mg, Oral, Q6H PRN **OR** ondansetron (ZOFRAN) injection 4 mg, 4 mg, Intravenous, Q6H PRN, Philip Arndt MD    polyethylene glycol [facility]) packet 17 g, 17 g, Oral, Daily PRN, Philip Arndt MD    traMADol Donna Lidia) tablet 50 mg, 50 mg, Oral, Q6H PRN, 50 mg at 03/22/21 0545 **OR** traMADol (ULTRAM) tablet 100 mg, 100 mg, Oral, Q6H PRN, Philip Arndt MD    acetaminophen (TYLENOL) tablet 500 mg, 500 mg, Oral, Q6H PRN, Philip Arndt MD, 500 mg at 03/21/21 1705      ALLERGIES:  Patient has no known allergies. PHYSICAL EXAM:   BP (!) 150/71   Pulse 85   Temp 98.4 °F (36.9 °C) (Oral)   Resp 18   Ht 5' 6\" (1.676 m)   Wt 159 lb (72.1 kg)   SpO2 91%   BMI 25.66 kg/m²   CONST:  Well developed, well nourished WM who appears stated age. Sleepy but alert, cooperative, no apparent distress. HEENT:   Head- Normocephalic, atraumatic. Eyes- Conjunctivae pink, anicteric. Neck-  No stridor, trachea midline, no jugular venous distention. CHEST: Chest symmetrical and non-tender to palpation.  No accessory muscle use or intercostal retractions. RESPIRATORY: Lung sounds - clear throughout fields. No wheezing, rales or rhonchi. Diminished. CARDIOVASCULAR:     Heart Inspection- shows no noted pulsations. Heart Ausculation- Irregularly irregular rhythm with controlled rate, 2/6 systolic murmur  PV: Trace lower extremity edema. Pedal pulses palpable, no clubbing or cyanosis. ABDOMEN: Soft, non-tender to light palpation. Bowel sounds present. MS: Good muscle strength and tone. No atrophy or abnormal movements. SKIN: Warm and dry. NEURO / PSYCH: Oriented to person, place and time. Speech clear and appropriate. Follows all commands. Pleasant affect. Diagnostic:  All diagnostic testing and lab work thus far this admission reviewed in detail. L Hip Xray 3/19/09273: Left femoral neck fracture     CXR 3/19/2021: No acute cardiopulmonary abnormality     CT Head WO 3/19/2021: No acute intracranial abnormality     CT Cervical Spine 3/19/2021: There is no acute compression fracture or subluxation of the cervical spine. Multilevel degenerative disc and degenerative joint disease        Intake/Output Summary (Last 24 hours) at 3/22/2021 1711  Last data filed at 3/22/2021 1258  Gross per 24 hour   Intake 0 ml   Output 550 ml   Net -550 ml       Labs:   CBC:   Recent Labs     03/20/21  0428 03/22/21  0517   WBC 10.6  --    HGB 12.8 10.5*   HCT 39.6 33.4*     --      BMP:   Recent Labs     03/22/21  0517      K 3.8   CO2 25   BUN 30*   CREATININE 1.0   LABGLOM >60   CALCIUM 8.2*     PT/INR:   Recent Labs     03/21/21  1412 03/22/21  0517   PROTIME 15.4* 18.6*   INR 1.3 1.6     CARDIAC ENZYMES:  Recent Labs     03/19/21 2221   TROPONINI <0.01     FASTING LIPID PANEL:  Lab Results   Component Value Date    CHOL 137 08/08/2019    HDL 51 08/08/2019    LDLCALC 73 08/08/2019    TRIG 53 08/08/2019       Echocardiogram: 3/22/21   Normal left ventricular systolic function. Ejection fraction is visually estimated at 55%.

## 2021-03-22 NOTE — DISCHARGE INSTR - COC
Continuity of Care Form    Patient Name: Angelica Quiroz   :  1930  MRN:  [id number]    Admit date:  3/19/2021  Discharge date:  ***    Code Status Order: Full Code   Advance Directives:   Advance Care Flowsheet Documentation       Date/Time Healthcare Directive Type of Healthcare Directive Copy in [address] Agent's Name Healthcare Agent's Phone Number    21 1416  No, patient does not have an advance directive for healthcare treatment -- -- -- -- --            Admitting Physician:  Hua Kurtz MD  PCP: Sania Floyd MD    Discharging Nurse: [facility] Unit/Room#: 0313/0313-02  Discharging Unit Phone Number: ***    Emergency Contact:   Extended Emergency Contact Information  Primary Emergency Contact: Ellen Plunkett  Address: [address] [place] [address] [address]  Phone: [phone]  Relation: Spouse  Secondary Emergency Contact: [facility] [address]  Phone: [phone]  Relation: Child    Past Surgical History:  Past Surgical History:   Procedure Laterality Date    CARDIOVERSION  2010    Successful to NSR at [facility].  CARDIOVERSION  2011    Successful to [facility]R at [facility].  CATARACT REMOVAL WITH IMPLANT      COLONOSCOPY  2007    REDUNDANT COLON- [address]    DOPPLER ECHOCARDIOGRAPHY  2019    Dr Jeramy Wan abnormal left ventricular systolic function--mod severe tricuspid regurg w/mild pulmonary hyn--mild mitral, aortic, pulmonic regurg.     EYE SURGERY      CATARACT    HIP SURGERY Left 3/21/2021    LEFT HIP HEMIARTHROPLASTY [facility]) performed by Hua [facility]z [facility] at [facility] Left 2013    Dual chamber-Dr. Yee-[employer]    PROSTATECTOMY      20 years ago    PROSTATECTOMY  1988    RETROPUBIC RADICAL WITH/WO NERVE SPARING       Immunization History:   Immunization History   Administered Date(s) Administered  COVID-19, Moderna, PF, 100mcg/0.5mL 01/15/2021    Influenza 01/22/2009, 10/17/2011    Influenza Vaccine, unspecified formulation 01/22/2009    Influenza, High Dose (Fluzone 65 yrs and older) 11/06/2014, 10/01/2015, 10/01/2015, 12/05/2016, 10/12/2017, 10/01/2018, 10/15/2019    Influenza, High-dose, Haylie Garcia, 65 yrs +, IM (Fluzone) 10/02/2020    Pneumococcal Conjugate 13-valent ([id number]) 03/31/2016    Pneumococcal Polysaccharide ([id number]) 11/06/2014       Active Problems:  Patient Active Problem List   Diagnosis Code    [facility] I[facility]91    Chronic anticoagulation Z79.01    Hypertension I10    H/O prostate cancer Z85.46    Orthostatic hypotension I95.1    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    Pacemaker Z95.0    Hyperlipidemia E78.5    Vitamin B12 deficiency E53.8    Vitamin D deficiency E55.9    Closed fracture of multiple pubic rami, right, initial encounter ([facility]) S32.591A    COPD (chronic obstructive pulmonary disease) ([facility]) J44.9    Acute bronchitis J20.9    Acute respiratory failure with hypoxia ([facility]) J96.01    Closed fracture of ramus of right pubis ([facility]) S32.591A    Back pain M54.9    Closed compression fracture of L1 lumbar vertebra S32.010A    Sacral fracture, closed ([facility]) S32.10XA    Left displaced femoral neck fracture ([facility]) S72.002A       Isolation/Infection:   Isolation            No Isolation          Patient Infection Status       None to display            Nurse Assessment:  Last Vital Signs: BP (!) 150/71   Pulse 85   Temp 98.4 °F (36.9 °C) (Oral)   Resp 18   Ht 5' 6\" (1.676 m)   Wt 159 lb (72.1 kg)   SpO2 91%   BMI 25.66 kg/m²     Last documented pain score (0-10 scale): Pain Level: 0  Last Weight:   Wt Readings from Last 1 Encounters:   03/22/21 159 lb (72.1 kg)     Mental Status:  Alert and oriented x4     IV Access:  none    Nursing Mobility/ADLs:  Walking   max assist with walker  Transfer Max assist  Bathing  dependent  Dressing dependent  Toileting  urinal and bedpan. Max assist  Feeding Setup   Med Admin  pills with water  Med Delivery       Wound Care Documentation and Therapy:  Incision 02/08/13 Chest Left (Active)   Number of days: 2963        Elimination:  Continence:   · Bowel: incontinent at times  · Bladder: incontinent at times and urinal   Urinary Catheter:   Colostomy/Ileostomy/Ileal Conduit:        Date of Last BM: 3/26/2021    Intake/Output Summary (Last 24 hours) at 3/22/2021 1233  Last data filed at 3/22/2021 0848  Gross per 24 hour   Intake 0 ml   Output 625 ml   Net -625 ml     I/O last 3 completed shifts: In: 1000 [I.V.:1000]  Out: 745 [Urine:725; Blood:20]    Safety Concerns:     Fall risk. Recent fall     Impairments/Disabilities:    Glasses. Hard of hearing    Nutrition Therapy:  Current Nutrition Therapy:   Dysphagia soft and bite sized. Low calorie high protein supplement     Routes of Feeding:  Liquids: thin   Daily Fluid Restriction: none  Last Modified Barium Swallow with Video (Video Swallowing Test):     Treatments at the Time of Hospital Discharge:   Respiratory Treatments: ***  Oxygen Therapy: room air  Ventilator:   none    Rehab Therapies: pt/ot  Weight Bearing Status/Restrictions: Other Medical Equipment (for information only, NOT a DME order):     Other Treatments: ***    Patient's personal belongings (please select all that are sent with patient):  Clothing, cell phone, glasses    RN SIGNATURE:    CASE MANAGEMENT/SOCIAL WORK SECTION    Inpatient Status Date: 03/19/2021    Readmission Risk Assessment Score:  Readmission Risk              Risk of Unplanned Readmission:        13           Discharging to Facility/ Agency   · Name: [address]  · Address:  · Phone: [phone]  · Fax: [phone]    Dialysis Facility (if applicable)   · Name:  · Address:  · Dialysis Schedule:  · Phone:  · Fax:    / signature: Electronically signed by MICHEL Gregory on 3/22/21 at 12:34 PM EDT    PHYSICIAN SECTION    Prognosis: Good    Condition at Discharge: Stable    Rehab Potential (if transferring to Rehab): Good    Recommended Labs or Other Treatments After Discharge: ***    Physician Certification: I certify the above information and transfer of Lesa Oshea  is necessary for the continuing treatment of the diagnosis listed and that he requires [facility] for less 30 days.      Update Admission H&P: {CHP DME Changes in ZQHIC:[id number]}    PHYSICIAN SIGNATURE:  Electronically signed by Jenny Purvis MD on 3/26/21 at 11:54 AM EDT

## 2021-03-22 NOTE — PROGRESS NOTES
Department of Orthopedic Surgery  Attending Progress Note      SUBJECTIVE  Patient is sleepy but is a and o x3.  recognized me    OBJECTIVE    Physical    VITALS:  BP (!) 150/71   Pulse 85   Temp 98.4 °F (36.9 °C) (Oral)   Resp 18   Ht 5' 6\" (1.676 m)   Wt 159 lb (72.1 kg)   SpO2 91%   BMI 25.66 kg/m²   Dressing intact and dry,  Calves are soft and nontender    Data    CBC:   Lab Results   Component Value Date    WBC 10.6 03/20/2021    RBC 4.05 03/20/2021    HGB 10.5 03/22/2021    HCT 33.4 03/22/2021    MCV 97.8 03/20/2021    MCH 31.6 03/20/2021    MCHC 32.3 03/20/2021    RDW 14.5 03/20/2021     03/20/2021    MPV 10.5 03/20/2021     BMP:    Lab Results   Component Value Date     03/22/2021    K 3.8 03/22/2021    K 3.9 03/19/2021     03/22/2021    CO2 25 03/22/2021    BUN 30 03/22/2021    LABALBU 3.8 08/08/2019    LABALBU 4.0 03/05/2012    CREATININE 1.0 03/22/2021    CALCIUM 8.2 03/22/2021    GFRAA >60 03/22/2021    LABGLOM >60 03/22/2021    GLUCOSE 128 03/22/2021    GLUCOSE 115 03/05/2012     PT/INR:    Lab Results   Component Value Date    PROTIME 18.6 03/22/2021    PROTIME 30.2 03/05/2021    INR 1.6 03/22/2021     Current Inpatient Medications    Current Facility-Administered Medications: warfarin (COUMADIN) tablet 5 mg, 5 mg, Oral, Every Other Day  [START ON 3/23/2021] warfarin (COUMADIN) tablet 4 mg, 4 mg, Oral, Every Other Day  morphine (PF) injection 2 mg, 2 mg, Intravenous, Q4H PRN  0.9 % sodium chloride infusion, , Intravenous, Continuous  sodium chloride flush 0.9 % injection 10 mL, 10 mL, Intravenous, 2 times per day  sodium chloride flush 0.9 % injection 10 mL, 10 mL, Intravenous, PRN  sennosides-docusate sodium (SENOKOT-S) 8.6-50 MG tablet 1 tablet, 1 tablet, Oral, BID  magnesium hydroxide (MILK OF MAGNESIA) 400 MG/5ML suspension 30 mL, 30 mL, Oral, Daily PRN  perflutren lipid microspheres (DEFINITY) injection 1.65 mg, 1.5 mL, Intravenous, ONCE PRN  atorvastatin (LIPITOR) tablet 10 mg, 10 mg, Oral, Every Other Day  clonazePAM (KLONOPIN) tablet 0.5 mg, 0.5 mg, Oral, Daily  promethazine (PHENERGAN) tablet 12.5 mg, 12.5 mg, Oral, Q6H PRN **OR** ondansetron (ZOFRAN) injection 4 mg, 4 mg, Intravenous, Q6H PRN  polyethylene glycol (GLYCOLAX) packet 17 g, 17 g, Oral, Daily PRN  traMADol (ULTRAM) tablet 50 mg, 50 mg, Oral, Q6H PRN **OR** traMADol (ULTRAM) tablet 100 mg, 100 mg, Oral, Q6H PRN  acetaminophen (TYLENOL) tablet 500 mg, 500 mg, Oral, Q6H PRN    ASSESSMENT AND PLAN      Left hip femoral neck fracture    Can WBAT  Back on coumadin.   Plan on ecf for rehab

## 2021-03-22 NOTE — PROGRESS NOTES
Physical Therapy    Physical Therapy Treatment Note    Room #:  0313/0313-02  Patient Name: Marianne Holstein  YOB: 1930  MRN: [id number]    Referring Provider:   Kuldeep Rogers MD     Date of Service: 3/22/2021    Evaluating Physical Therapist: Eli Choudhury, PT  #[id number]       Diagnosis:   Left displaced femoral neck fracture ([address] [S72.002A]   D/t fall  S.p left hemiarthroplasty    Patient Active Problem List   Diagnosis    A-fib ([address]    Chronic anticoagulation    Hypertension    H/O prostate cancer    Orthostatic hypotension    Sinus bradycardia-tachycardia syndrome ([address]    Pacemaker    Hyperlipidemia    Vitamin B12 deficiency    Vitamin D deficiency    Closed fracture of multiple pubic rami, right, initial encounter ([address]    COPD (chronic obstructive pulmonary disease) ([address]    Acute bronchitis    Acute respiratory failure with hypoxia ([address]    Closed fracture of ramus of right pubis (HCC)    Back pain    Closed compression fracture of L1 lumbar vertebra    Sacral fracture, closed ([address]    Left displaced femoral neck fracture ([address]        Tentative placement recommendation: Subacute rehab    Equipment recommendation: To be determined      Prior Level of Function: Patient ambulated independently    Rehab Potential: good    for baseline    Past medical history:   Past Medical History:   Diagnosis Date    Atrial fibrillation ([address]     Cancer ([address]     prostate     Hyperlipidemia     Hypertension     Syncope and collapse      Past Surgical History:   Procedure Laterality Date    CARDIOVERSION  2/22/2010    Successful to [facility] at [facility].  CARDIOVERSION  8/11/2011    Successful to [facility] at [facility].     CATARACT REMOVAL WITH IMPLANT      COLONOSCOPY  09/19/2007    REDUNDANT COLON- [address]    DOPPLER ECHOCARDIOGRAPHY  08/29/2019    Dr Gardenia Jackson abnormal left ventricular systolic function--mod severe tricuspid regurg w/mild pulmonary hyn--mild mitral, aortic, pulmonic regurg.  EYE SURGERY      CATARACT    HIP SURGERY Left 3/21/2021    LEFT HIP HEMIARTHROPLASTY [facility]) performed by Leslee Austin MD at [id number] [address] Left 2-8-2013    Dual chamber-Dr. 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, alarm and pacemaker , weight bearing as tolerated Left lower extremity , 4 Liters of o2 via nasal cannula   SUBJECTIVE:    Social history: Patient lives with spouse   in a ranch home  with 3 steps  to enter with [employer] in shower grab bars    Equipment owned: Miguel Angel Sandovalo,       [address]   How much difficulty turning over in bed?: A Lot  How much difficulty sitting down on / standing up from a chair with arms?: Unable  How much difficulty moving from lying on back to sitting on side of bed?: Unable  How much help from another person moving to and from a bed to a chair?: Total  How much help from another person needed to walk in hospital room?: Total  How much help from another person for climbing 3-5 steps with a railing?: Total  AM-PAC Inpatient Mobility Raw Score : 7  AM-PAC Inpatient T-Scale Score : 26.42  Mobility Inpatient CMS 0-100% Score: 92.36  Mobility Inpatient CMS G-Code Modifier : CM    Nursing cleared patient for PT treatment. decreased alertness today; son present for stim and ex      OBJECTIVE;   Initial Evaluation  Date: 3/21/2021 Treatment Date:  3/22/2021      Short Term/ Long Term   Goals   Was pt agreeable to Eval/treatment?  Yes    To be met in 3 days   Pain level   0/10        Bed Mobility    Rolling: Maximal assist of 1    Supine to sit: Not assessed     Sit to supine: Not assessed     Scooting: Not assessed    Rolling: Maximal assist of  2    Supine to sit: Not assessed     Sit to supine: Not assessed     Scooting: Not assessed     Rolling: Minimal assist of 1    Supine to sit: Minimal assist of 1    Sit to supine: Minimal assist of 1    Scooting: Minimal assist of 1     Transfers Sit to stand: Not assessed     Sit to stand: Moderate assist of 1     Ambulation    not assessed      50 feet using  wheeled walker with Minimal assist of 1    Stair negotiation: ascended and descended   Not assessed       3 steps with rail  Min a   ROM Within functional limits       Strength BUE:  2/5  RLE:  2/5  LLE:  2/5   Increase strength in affected mm groups by 1/3 grade   Balance Sitting EOB:  not assessed    Dynamic Standing:  not assessed    Sitting EOB:  good    Dynamic Standing: fair       Patient is   & Oriented x person and does not follow directions  drowsy  Sensation:  Patient  denies numbness and tingling     Edema:  yes left lower extremity    Endurance: poor         Patient education  Patient educated on role of Physical Therapy, risks of immobility, safety and plan of care,  importance of mobility while in hospital , hip precautions and weight bearing status       Patient response to education:   Pt verbalized understanding Pt demonstrated skill Pt requires further education in this area   No No Yes      Treatment:  Patient practiced and was instructed/facilitated in the following treatment: Patient   placed in chair position for exercises and stimulation  X 15 min, head control ex and rom bue and positioning to reduce edema     Therapeutic Exercises:  ankle pumps, heel slide, hip abduction/adduction and long arc quad  x 5-10 reps. A/a    At end of session, patient in bed with alarm call light and phone within reach,   all lines and tubes intact, nursing notified. Patient would benefit from continued skilled Physical Therapy to improve functional independence and quality of life.           Patient's/ family goals   none stated        ASSESSMENT: Patient exhibits decreased strength, balance, endurance, range of motion and pain left lower extremity impairing functional mobility, transfers, gait , tolerance to activity and participation  Ultram this am, more drowsy than yesterday; unsafe to sit edge of bed this session, recommend low air mattress d/t poor mobility and alertness, nursing notified    Plan of Care:     -Bed Mobility: Lower extremity exercises , Upper extremity exercises  and Trunk control activities   -Sitting Balance: Incorporate reaching activities to activate trunk muscles , Hands on support to maintain midline  and Facilitate postural control in all planes   -Standing Balance: Perform strengthening exercises in standing to promote motor control with or without upper extremity support  and Instruct patient on adequate base of support to maintain balance  -Transfers: Provide instruction on proper hand and foot position for adequate transfer of weight onto lower extremities and use of gait device, Cues for hand placement, technique and safety, Facilitate weight shift forward on to lower extremities and provide necessary stabilization of bilateral lower extremities , Support transfer of weight on to lower extremities, Assist with extension of knees trunk and hip to accept weight transfer  and Provide stabilization to prevent fall   -Gait: Gait training, Standing activities to improve: base of support, weight shift, weight bearing , Exercises to improve trunk control and Exercises to improve hip and knee control   -Endurance: Utilize Supervised activities to increase level of endurance to allow for safe functional mobility including transfers and gait     Patient and or family understand(s) diagnosis, prognosis, and plan of care. Frequency of treatments: Patient will be seen  twice daily.        Time in  917  Time out  945    Total Treatment Time  28 minutes       CPT codes:     Therapeutic activities (72135)   15 minutes  1 unit(s)  Therapeutic exercises (37007)   13 minutes  1 unit(s)    Dianne Maya, PT

## 2021-03-22 NOTE — CARE COORDINATION
SS Note/Discharge plan:  Met with pt's son Héctor Hernandez at his father's bedside, explained sw role for transition of care and consult, pt currently sleeping soundly, Héctor Hernandez says his father will need a rehab placement but request sw call his sister, pt's daughter, Abhay Townsend regarding facility preference, sw spoke with Abhay Townsend by phone and reviewed therapy evals/recommendations for [facility] and SNF provider list, she relayed that her father went to [facility] in the past and she prefers placement there again on discharge, referral made to [facility] admissions liaison, awaiting chart review and acceptance, sw/christa to follow. Electronically signed by MICHEL Franco on 3/22/2021 at 10:47 AM

## 2021-03-22 NOTE — CARE COORDINATION
SS Note/Discharge plan:  COVID PENDING 3/22/21: Notified by Starla Kyle admissions for [facility] that they are accepting pt medically for [facility] placement and are following for therapy evals to SUBMIT PRE CERT, will need a NEGATIVE COVID TEST prior to transfer, JAMILA generated in [address] [place] [address] exemption completed, sw will follow to confirm transfer arrangements. Electronically signed by MICHEL Mercado on 3/22/2021 at 12:32 PM

## 2021-03-22 NOTE — PROGRESS NOTES
SPEECH/LANGUAGE PATHOLOGY  CLINICAL ASSESSMENT OF SWALLOWING FUNCTION    PATIENT NAME:  Anmol Plunkett      :  1930      TODAY'S DATE:  3/22/2021  ROOM:  [address] [place] [address]    SUMMARY OF EVALUATION    RN cleared Pt for participation in assessment?: yes     DYSPHAGIA DIAGNOSIS:  Clinical indicators consistent with pharyngeal dysphagia       DIET RECOMMENDATIONS:  NPO (nothing by mouth including oral meds)   Until MBSS    THERAPY RECOMMENDATIONS:      Dysphagia therapy is recommended 3-5 times per week for LOS or when goals are met. A Video Swallow Study (MBSS) is recommended and requires a physician order        FEEDING RECOMMENDATIONS:     Assistance level:  Not applicable      Compensatory strategies recommended: Not applicable    FOIS (Functional Oral Intake Scale)   Current Status: Level 1: No oral intake. Short Term Goal: Level 7: Total oral diet with no restrictions   Long Term Goal: Level 7: Total oral diet with no restrictions     PAST HISTORY OF DYSPHAGIA?: no  Diet PRIOR to hospital admission:    Regular consistency solids with  thin liquids   COGNITION: Alert & Oriented x 2, Follows 2 - step directions appropriate for this assessment and Attention impaired                PROCEDURE     Oral Peripheral Examination   Generalized oral weakness    Current Respiratory Status   room air     Parameters of Speech Production  Respiration:  Adequate for speech production  Quality:   Within functional limits  Intensity: Within functional limits    Volitional Swallow: very dry mouth not able to elicit  Volitional Cough:    weak    Consistencies Administered During the Evaluation   Liquids: thin liquid, nectar thick liquid and pudding thick liquid   Solids:  pureed foods      Method of Intake:   spoon  Fed by clinician      Position:   Seated, upright                  RESULTS     Oral Stage:          The oral stage of swallowing was within functional limits      Pharyngeal Stage:      Throat clearing

## 2021-03-22 NOTE — PROGRESS NOTES
Date:3/22/2021  Patient Name: Lesa Oshea  MRN: [id number]  : 1930  ROOM #: 0313/0313-02    Occupational Therapy order received, chart reviewed and evaluation attempted this date. Patient is unavailable for OT evaluation due to sleeping soundly. Will attempt OT evaluation at a later time. Thank you.    Ruth Brown OTR/L #[id number]

## 2021-03-22 NOTE — PROGRESS NOTES
Date:3/22/2021  Patient Name: Steve Webb  MRN: [id number]  : 1930  ROOM #: 0313/0313-02    Occupational Therapy order received, chart reviewed and evaluation attempted this date. Patient is unavailable for OT evaluation due to being off the unit for a swallow study. Will attempt OT evaluation at a later time. Thank you.    Ritu Davis OTR/L #[id number]

## 2021-03-23 ENCOUNTER — APPOINTMENT (OUTPATIENT)
Dept: CT IMAGING | Age: 86
DRG: 522 | End: 2021-03-23
Payer: MEDICARE

## 2021-03-23 LAB
AMMONIA: 24 UMOL/L (ref 16–60)
AMORPHOUS: ABNORMAL
ANION GAP SERPL CALCULATED.3IONS-SCNC: 10 MMOL/L (ref 7–16)
BACTERIA: ABNORMAL /HPF
BASOPHILS ABSOLUTE: 0.01 E9/L (ref 0–0.2)
BASOPHILS RELATIVE PERCENT: 0.1 % (ref 0–2)
BILIRUBIN URINE: NEGATIVE
BLOOD, URINE: ABNORMAL
BUN BLDV-MCNC: 31 MG/DL (ref 8–23)
CALCIUM SERPL-MCNC: 8 MG/DL (ref 8.6–10.2)
CHLORIDE BLD-SCNC: 108 MMOL/L (ref 98–107)
CLARITY: ABNORMAL
CO2: 24 MMOL/L (ref 22–29)
COLOR: YELLOW
CREAT SERPL-MCNC: 0.9 MG/DL (ref 0.7–1.2)
EKG ATRIAL RATE: 94 BPM
EKG Q-T INTERVAL: 376 MS
EKG QRS DURATION: 92 MS
EKG QTC CALCULATION (BAZETT): 447 MS
EKG R AXIS: 9 DEGREES
EKG T AXIS: -70 DEGREES
EKG VENTRICULAR RATE: 85 BPM
EOSINOPHILS ABSOLUTE: 0.16 E9/L (ref 0.05–0.5)
EOSINOPHILS RELATIVE PERCENT: 1.9 % (ref 0–6)
FOLATE: 15.5 NG/ML (ref 4.8–24.2)
GFR AFRICAN AMERICAN: >60
GFR NON-AFRICAN AMERICAN: >60 ML/MIN/1.73
GLUCOSE BLD-MCNC: 98 MG/DL (ref 74–99)
GLUCOSE URINE: NEGATIVE MG/DL
HCT VFR BLD CALC: 29.1 % (ref 37–54)
HCT VFR BLD CALC: 30.7 % (ref 37–54)
HEMOGLOBIN: 9.4 G/DL (ref 12.5–16.5)
HEMOGLOBIN: 9.8 G/DL (ref 12.5–16.5)
IMMATURE GRANULOCYTES #: 0.08 E9/L
IMMATURE GRANULOCYTES %: 0.9 % (ref 0–5)
INR BLD: 2.6
KETONES, URINE: NEGATIVE MG/DL
LACTIC ACID: 1.1 MMOL/L (ref 0.5–2.2)
LEUKOCYTE ESTERASE, URINE: NEGATIVE
LYMPHOCYTES ABSOLUTE: 0.93 E9/L (ref 1.5–4)
LYMPHOCYTES RELATIVE PERCENT: 11 % (ref 20–42)
MCH RBC QN AUTO: 32.2 PG (ref 26–35)
MCHC RBC AUTO-ENTMCNC: 31.9 % (ref 32–34.5)
MCV RBC AUTO: 101 FL (ref 80–99.9)
MONOCYTES ABSOLUTE: 1.27 E9/L (ref 0.1–0.95)
MONOCYTES RELATIVE PERCENT: 15 % (ref 2–12)
NEUTROPHILS ABSOLUTE: 6.02 E9/L (ref 1.8–7.3)
NEUTROPHILS RELATIVE PERCENT: 71.1 % (ref 43–80)
NITRITE, URINE: NEGATIVE
PDW BLD-RTO: 14.7 FL (ref 11.5–15)
PH UA: 5.5 (ref 5–9)
PLATELET # BLD: 116 E9/L (ref 130–450)
PMV BLD AUTO: 10.9 FL (ref 7–12)
POTASSIUM SERPL-SCNC: 3.6 MMOL/L (ref 3.5–5)
PROTEIN UA: 100 MG/DL
PROTHROMBIN TIME: 30.1 SEC (ref 9.3–12.4)
RBC # BLD: 3.04 E12/L (ref 3.8–5.8)
RBC UA: >20 /HPF (ref 0–2)
SODIUM BLD-SCNC: 142 MMOL/L (ref 132–146)
SPECIFIC GRAVITY UA: >=1.03 (ref 1–1.03)
TSH SERPL DL<=0.05 MIU/L-ACNC: 0.95 UIU/ML (ref 0.27–4.2)
UROBILINOGEN, URINE: 0.2 E.U./DL
VITAMIN B-12: 283 PG/ML (ref 211–946)
VITAMIN D 25-HYDROXY: 14 NG/ML (ref 30–100)
WBC # BLD: 8.5 E9/L (ref 4.5–11.5)
WBC UA: ABNORMAL /HPF (ref 0–5)

## 2021-03-23 PROCEDURE — 87088 URINE BACTERIA CULTURE: CPT

## 2021-03-23 PROCEDURE — 6370000000 HC RX 637 (ALT 250 FOR IP): Performed by: ORTHOPAEDIC SURGERY

## 2021-03-23 PROCEDURE — 97530 THERAPEUTIC ACTIVITIES: CPT

## 2021-03-23 PROCEDURE — 6370000000 HC RX 637 (ALT 250 FOR IP): Performed by: INTERNAL MEDICINE

## 2021-03-23 PROCEDURE — 36415 COLL VENOUS BLD VENIPUNCTURE: CPT

## 2021-03-23 PROCEDURE — 82306 VITAMIN D 25 HYDROXY: CPT

## 2021-03-23 PROCEDURE — 85018 HEMOGLOBIN: CPT

## 2021-03-23 PROCEDURE — 2580000003 HC RX 258: Performed by: ORTHOPAEDIC SURGERY

## 2021-03-23 PROCEDURE — 97165 OT EVAL LOW COMPLEX 30 MIN: CPT

## 2021-03-23 PROCEDURE — 1200000000 HC SEMI PRIVATE

## 2021-03-23 PROCEDURE — 97535 SELF CARE MNGMENT TRAINING: CPT

## 2021-03-23 PROCEDURE — 85610 PROTHROMBIN TIME: CPT

## 2021-03-23 PROCEDURE — 82140 ASSAY OF AMMONIA: CPT

## 2021-03-23 PROCEDURE — 83605 ASSAY OF LACTIC ACID: CPT

## 2021-03-23 PROCEDURE — 82746 ASSAY OF FOLIC ACID SERUM: CPT

## 2021-03-23 PROCEDURE — 70450 CT HEAD/BRAIN W/O DYE: CPT

## 2021-03-23 PROCEDURE — 6360000002 HC RX W HCPCS: Performed by: INTERNAL MEDICINE

## 2021-03-23 PROCEDURE — 99233 SBSQ HOSP IP/OBS HIGH 50: CPT | Performed by: INTERNAL MEDICINE

## 2021-03-23 PROCEDURE — 2580000003 HC RX 258: Performed by: INTERNAL MEDICINE

## 2021-03-23 PROCEDURE — 92526 ORAL FUNCTION THERAPY: CPT | Performed by: SPEECH-LANGUAGE PATHOLOGIST

## 2021-03-23 PROCEDURE — 93010 ELECTROCARDIOGRAM REPORT: CPT | Performed by: INTERNAL MEDICINE

## 2021-03-23 PROCEDURE — 81001 URINALYSIS AUTO W/SCOPE: CPT

## 2021-03-23 PROCEDURE — 85025 COMPLETE CBC W/AUTO DIFF WBC: CPT

## 2021-03-23 PROCEDURE — 82607 VITAMIN B-12: CPT

## 2021-03-23 PROCEDURE — 84443 ASSAY THYROID STIM HORMONE: CPT

## 2021-03-23 PROCEDURE — 85014 HEMATOCRIT: CPT

## 2021-03-23 PROCEDURE — 87040 BLOOD CULTURE FOR BACTERIA: CPT

## 2021-03-23 PROCEDURE — 80048 BASIC METABOLIC PNL TOTAL CA: CPT

## 2021-03-23 PROCEDURE — 2060000000 HC ICU INTERMEDIATE R&B

## 2021-03-23 RX ORDER — SODIUM CHLORIDE 9 MG/ML
25 INJECTION, SOLUTION INTRAVENOUS EVERY 8 HOURS
Status: DISCONTINUED | OUTPATIENT
Start: 2021-03-23 | End: 2021-03-24

## 2021-03-23 RX ORDER — DOXYCYCLINE HYCLATE 100 MG/1
100 CAPSULE ORAL EVERY 12 HOURS SCHEDULED
Status: DISCONTINUED | OUTPATIENT
Start: 2021-03-23 | End: 2021-03-24

## 2021-03-23 RX ORDER — SODIUM CHLORIDE 9 MG/ML
25 INJECTION, SOLUTION INTRAVENOUS EVERY 8 HOURS
Status: DISCONTINUED | OUTPATIENT
Start: 2021-03-23 | End: 2021-03-23 | Stop reason: SDUPTHER

## 2021-03-23 RX ADMIN — ACETAMINOPHEN 500 MG: 500 TABLET ORAL at 14:39

## 2021-03-23 RX ADMIN — SODIUM CHLORIDE: 9 INJECTION, SOLUTION INTRAVENOUS at 13:57

## 2021-03-23 RX ADMIN — CLONAZEPAM 0.5 MG: 1 TABLET ORAL at 21:00

## 2021-03-23 RX ADMIN — DOCUSATE SODIUM 50 MG AND SENNOSIDES 8.6 MG 1 TABLET: 8.6; 5 TABLET, FILM COATED ORAL at 20:56

## 2021-03-23 RX ADMIN — Medication 10 ML: at 21:02

## 2021-03-23 RX ADMIN — PIPERACILLIN SODIUM AND TAZOBACTAM SODIUM 3375 MG: 3; .375 INJECTION, POWDER, LYOPHILIZED, FOR SOLUTION INTRAVENOUS at 20:56

## 2021-03-23 RX ADMIN — PIPERACILLIN SODIUM AND TAZOBACTAM SODIUM 3375 MG: 3; .375 INJECTION, POWDER, LYOPHILIZED, FOR SOLUTION INTRAVENOUS at 13:43

## 2021-03-23 RX ADMIN — DOXYCYCLINE HYCLATE 100 MG: 100 CAPSULE ORAL at 21:00

## 2021-03-23 RX ADMIN — WARFARIN SODIUM 4 MG: 4 TABLET ORAL at 18:28

## 2021-03-23 ASSESSMENT — PAIN SCALES - GENERAL
PAINLEVEL_OUTOF10: 0
PAINLEVEL_OUTOF10: 3
PAINLEVEL_OUTOF10: 0

## 2021-03-23 NOTE — PROGRESS NOTES
OCCUPATIONAL THERAPY  Initial Evaluation  Date:3/23/2021  Patient Name: Steve Webb (Dr. Vernell Davidson)   MRN: [id number]  : 1930  ROOM #: 6604/8919-09     Referring Provider: Jimbo Merritt MD  Evaluating OT: Ritu Davis OTR/L [id number]    Placement Recommendation: Inpatient Rehab    Recommended Adaptive Equipment: TBD at rehab     [facility]   AM-[facility] Daily Activity Inpatient   How much help for putting on and taking off regular lower body clothing?: Total  How much help for Bathing?: A Lot  How much help for Toileting?: Total  How much help for putting on and taking off regular upper body clothing?: A Lot  How much help for taking care of personal grooming?: A Lot  How much help for eating meals?: A Little  AM-[facility] Inpatient Daily Activity Raw Score: 11  AM-PAC Inpatient ADL T-Scale Score : 29.04  ADL Inpatient CMS 0-100% Score: 70.42  ADL Inpatient CMS G-Code Modifier : CL    Based on patient's functional performance as stated below and their level of assistance needed prior to admission, this therapist believes that the patient would benefit from skilled Occupational Therapy following their hospital stay in an effort to increase safety and independence with completion of ADL/IADL tasks for functional independence and quality of life. Diagnosis:   1. Left displaced femoral neck fracture ([facility]    2. Fall, initial encounter      Pertinent Medical History:   Past Medical History:   Diagnosis Date    Atrial fibrillation ([facility]     Cancer ([facility]     prostate     Hyperlipidemia     Hypertension     Syncope and collapse       Precautions:  falls, full weight bearing: L LE, Hip Precautions, rash to R flank area    Pain Scale: Numeric Rate: 8/10 pain in L hip; Nursing notified. Social history: with family: spouse    Home architecture: single family home, 1 story, 3 steps to enter with rail, tub shower and walk in shower.    PLOF: independent with BADL and independent with IADL, pt ambulated with no device   Equipment owned: wheeled walker, cane, shower chair, grab bars  Cognition: A&O x 3; follows 1 step directions. fair  Problem solving skills   fair  Memory    fair  Sequencing   fair  Judgement/safety  Communication: intact   Visual perceptual skills: intact     Glasses: yes   Edema: no     Sensation: intact   Hand Dominance:  Left     X Right     Left Right Comment   Passive range of motion [facility]     Active range of motion [facility]     Muscle Grade 4/5 4/5    /pinch Strength Intact  Intact       Functional Assessment:   Initial Evaluation Status Date:   3/23/2021 Treatment Status  Date: STGs = LTGs  Time frame: 5 - 14 days   Feeding Minimal Assist to bring cup to mouth to take sips of water at edge of bed  Independent    Grooming Maximal Assist   Supervision    UB Dressing Maximal Assist to tie gown   Supervision    LB Dressing Dependent   Modified Scurry    Bathing Maximal Assist  Minimal Assist    Toileting Dependent for hygiene   Minimal Assist    Bed Mobility  Facilitated Supine to sit: Maximal Assist x 2   Scooting:Maximal Assist x 2   Sit to supine: Maximal Assist x 2    Rolling: Maximal Assist x 2 time three reps to adjust linens, hygiene, and to assist nursing in measuring rash on R flank area. Supine to sit: Supervision   Scooting:Supervision  Sit to supine: Supervision   Rolling: Supervision   Functional Transfers Maximal Assist x 2 from EOB to wheeled walker with use of chux as sling. Transfer training with verbal cues for hand placement throughout session to improve safety. Minimal Assist     Functional Mobility Maximal Assist x 2 with wheeled walker to improve balance to side step 1 foot towards head of bed, verbal cues for walker sequence and safety.  Assistance for walker negotiation  Modified Scurry    Activity Tolerance fair  good     Patient required co-evaluation/treatment with physical therapy due to the level of physical assistance needed and the patients endurance level to tolerate separate sessions. Balance:   Sitting balance at EOB to increase dynamic sitting balance and activities tolerance with moderate assist progressing to minimal assist d/t anterior lean. Maximal verbal cues to maintain hip precautions      Standing fair minus/poor with wheeled walker improve balance   Endurance: fair minus      Comments: Upon arrival to the room the patient was supine. Pt and daughter educated on weight bearing status and hip precautions. At end of the session, the patient was returned to supine with L LE elevated on a pillow. Call light and phone within reach. Pt required verbal cues and instruction as noted above for safe facilitation and completion of tasks. Therapist provided skilled monitoring of the patient's response during treatment session. Prior to and at the end of session, environmental modifications/line management completed for patients safety and efficiency of treatment session. OT services provided to include instruction/training on safety and adapted techniques for completion of transfers and ADL's. Overall, the patient demonstrates difficulties with completion of BADL's and IADL's. Factors contributing to these difficulties includes decreased endurance and generalized weakness. Pt would benefit from continued skilled OT to increase independence with BADL's and IADL's. Treatment:    Bed mobility: Facilitated bed mobility with cues for proper body mechanics and sequencing to prepare for ADL completion. Functional transfers: Facilitated transfers from various surfaces with cues for body alignment, safety and hand placement. ADL completion: Self-care retraining for the above-mentioned ADLs; training on proper hand placement, safety technique, sequencing, and energy conservation techniques. Postural Balance: Sitting and standing balance retraining to improve righting reactions with postural changes during ADLs.   Skilled positioning: Proper positioning to improve established goals developed with patient and family. Patient / Family Goal: go to rehab       Patient and/or family were instructed on functional diagnosis, prognosis/goals and OT plan of care. Demonstrated fair understanding. Evaluation time includes thorough review of current medical information, gathering information on past medical history/social history and prior level of function, completion of standardized testing/informal observation of tasks, assessment of data, and development of POC/Goals.     Time In and Out: 1:43 - 2:00pm and 2:30 - 3:12pm                  Min Units   OT Eval Low 27047 X     OT Eval Medium 45087     OT Eval High 17523     OT Re-Eval 44702          ADL/Self Care 35090 10 1    Therapeutic Activities 20580 99 1    Therapeutic Ex 27002     Orthotic Management 11317     Neuro Re-Ed 12750     Non-Billable Time     TOTAL TIMED TREATMENT 31 1859 [facility] OTR/L [id number]

## 2021-03-23 NOTE — PROGRESS NOTES
Department of Internal Medicine  General Internal Medicine  Attending Progress Note      SUBJECTIVE:    Patient is still lethargic. He is easily aroused and follows command. However, he quickly goes back to sleep. He was able to protrude his tongue, raise both hands up when asked. There is no asymmetry on his face. Unsure of why he is this lethargic. I am thinking that by this time the effect of anaesthesia must have worn out.      OBJECTIVE      Medications    Current Facility-Administered Medications: warfarin (COUMADIN) tablet 5 mg, 5 mg, Oral, Every Other Day  warfarin (COUMADIN) tablet 4 mg, 4 mg, Oral, Every Other Day  morphine (PF) injection 2 mg, 2 mg, Intravenous, Q4H PRN  0.9 % sodium chloride infusion, , Intravenous, Continuous  sodium chloride flush 0.9 % injection 10 mL, 10 mL, Intravenous, 2 times per day  sodium chloride flush 0.9 % injection 10 mL, 10 mL, Intravenous, PRN  sennosides-docusate sodium (SENOKOT-S) 8.6-50 MG tablet 1 tablet, 1 tablet, Oral, BID  magnesium hydroxide (MILK OF MAGNESIA) 400 MG/5ML suspension 30 mL, 30 mL, Oral, Daily PRN  perflutren lipid microspheres (DEFINITY) injection 1.65 mg, 1.5 mL, Intravenous, ONCE PRN  atorvastatin (LIPITOR) tablet 10 mg, 10 mg, Oral, Every Other Day  clonazePAM (KLONOPIN) tablet 0.5 mg, 0.5 mg, Oral, Daily  promethazine (PHENERGAN) tablet 12.5 mg, 12.5 mg, Oral, Q6H PRN **OR** ondansetron (ZOFRAN) injection 4 mg, 4 mg, Intravenous, Q6H PRN  polyethylene glycol (GLYCOLAX) packet 17 g, 17 g, Oral, Daily PRN  traMADol (ULTRAM) tablet 50 mg, 50 mg, Oral, Q6H PRN **OR** traMADol (ULTRAM) tablet 100 mg, 100 mg, Oral, Q6H PRN  acetaminophen (TYLENOL) tablet 500 mg, 500 mg, Oral, Q6H PRN  Physical    VITALS:  BP (!) 132/59   Pulse 83   Temp 99.8 °F (37.7 °C) (Oral)   Resp 16   Ht 5' 6\" (1.676 m)   Wt 159 lb (72.1 kg)   SpO2 94%   BMI 25.66 kg/m²   CONSTITUTIONAL:  awake, alert, cooperative, no apparent distress, and appears stated age  EYES:  Lids and lashes normal, pupils equal, round and reactive to light, extra ocular muscles intact, sclera clear, conjunctiva normal  ENT:  Normocephalic, without obvious abnormality, atraumatic, sinuses nontender on palpation, external ears without lesions, oral pharynx with moist mucus membranes, tonsils without erythema or exudates, gums normal and good dentition.   NECK:  Supple, symmetrical, trachea midline, no adenopathy, thyroid symmetric, not enlarged and no tenderness, skin normal  HEMATOLOGIC/LYMPHATICS:  no cervical lymphadenopathy  BACK:  Symmetric, no curvature, spinous processes are non-tender on palpation, paraspinous muscles are non-tender on palpation, no costal vertebral tenderness  LUNGS:  No increased work of breathing, good air exchange, clear to auscultation bilaterally, no crackles or wheezing  CARDIOVASCULAR:  Normal apical impulse, regular rate and rhythm, normal S1 and S2, no S3 or S4, and no murmur noted  ABDOMEN:  No scars, normal bowel sounds, soft, non-distended, non-tender, no masses palpated, no hepatosplenomegally  MUSCULOSKELETAL:  there is no redness, warmth, or swelling of the joints  NEUROLOGIC:  No focal neuro deficit  SKIN:  no bruising or bleeding  Data    CBC:   Lab Results   Component Value Date    WBC 10.6 03/20/2021    RBC 4.05 03/20/2021    HGB 9.4 03/23/2021    HCT 29.1 03/23/2021    MCV 97.8 03/20/2021    MCH 31.6 03/20/2021    MCHC 32.3 03/20/2021    RDW 14.5 03/20/2021     03/20/2021    MPV 10.5 03/20/2021     CMP:    Lab Results   Component Value Date     03/23/2021    K 3.6 03/23/2021    K 3.9 03/19/2021     03/23/2021    CO2 24 03/23/2021    BUN 31 03/23/2021    CREATININE 0.9 03/23/2021    GFRAA >60 03/23/2021    LABGLOM >60 03/23/2021    GLUCOSE 98 03/23/2021    GLUCOSE 115 03/05/2012    PROT 7.4 01/25/2018    LABALBU 3.8 08/08/2019    LABALBU 4.0 03/05/2012    CALCIUM 8.0 03/23/2021    BILITOT 1.1 08/08/2019    ALKPHOS 82 08/08/2019    AST 19 08/08/2019 ALT 9 08/08/2019       ASSESSMENT AND PLAN      1. Left displaced femoral neck fracture ([address]  S/p repair. Post op Day 2. Pain control  PT/OT    2. Lethargy/AMS:  Etiology unknown. Initially suspected to be post anaesthesia effect. However, this has lasted longer than expected. Work up for infectious vs neurological etiology in place  Ordered EKG  Cover with zosyn for concern for aspiration since last X ray was suggestive of infiltrate. Added Doxy also. 3. Hx of Atrial Fibrillation: rate controlled  INR is therapeutic  Continue to monitor  Appreciate Cardiology input    4. Elevated blood pressure without dx of hypertension: will continue to monitor off . BP better now. Not on any med currently    5.  Hyperlipidemia: on statin

## 2021-03-23 NOTE — PROGRESS NOTES
Dr. Anita Mercado updated that patient medicated with prn tylenol for c/o right ear pain. Patient stated ear pain is intermittent. RN noted red rash area on patient's right flank area. Area of redness circled. RN to continue to monitor.

## 2021-03-23 NOTE — PROGRESS NOTES
microspheres (DEFINITY) injection 1.65 mg, 1.5 mL, Intravenous, ONCE PRN  atorvastatin (LIPITOR) tablet 10 mg, 10 mg, Oral, Every Other Day  clonazePAM (KLONOPIN) tablet 0.5 mg, 0.5 mg, Oral, Daily  promethazine (PHENERGAN) tablet 12.5 mg, 12.5 mg, Oral, Q6H PRN **OR** ondansetron (ZOFRAN) injection 4 mg, 4 mg, Intravenous, Q6H PRN  polyethylene glycol (GLYCOLAX) packet 17 g, 17 g, Oral, Daily PRN  traMADol (ULTRAM) tablet 50 mg, 50 mg, Oral, Q6H PRN **OR** traMADol (ULTRAM) tablet 100 mg, 100 mg, Oral, Q6H PRN  acetaminophen (TYLENOL) tablet 500 mg, 500 mg, Oral, Q6H PRN    ASSESSMENT AND PLAN      Femoral neck fracture    Patient has be somnolent. 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 Continue the rehab and wbat. Plan on ecf for rehab.

## 2021-03-23 NOTE — PROGRESS NOTES
Physical Therapy    Physical Therapy Treatment Note    Room #:  9044/8042-60  Patient Name: Kristel Reed  YOB: 1930  MRN: [id number]    Referring Provider:   David Coronado MD     Date of Service: 3/23/2021    Evaluating Physical Therapist: Dianne Maya, PT  #[id number]       Diagnosis:   Left displaced femoral neck fracture ([address] [S72.002A]   D/t fall  S.p left hemiarthroplasty    Patient Active Problem List   Diagnosis    A-fib ([address]    Chronic anticoagulation    Hypertension    H/O prostate cancer    Orthostatic hypotension    Sinus bradycardia-tachycardia syndrome ([address]    Pacemaker    Hyperlipidemia    Vitamin B12 deficiency    Vitamin D deficiency    Closed fracture of multiple pubic rami, right, initial encounter ([address]    COPD (chronic obstructive pulmonary disease) ([address]    Acute bronchitis    Acute respiratory failure with hypoxia ([address]    Closed fracture of ramus of right pubis (HCC)    Back pain    Closed compression fracture of L1 lumbar vertebra    Sacral fracture, closed ([address]    Left displaced femoral neck fracture ([address]        Tentative placement recommendation: Subacute rehab    Equipment recommendation: To be determined      Prior Level of Function: Patient ambulated independently    Rehab Potential: good    for baseline    Past medical history:   Past Medical History:   Diagnosis Date    Atrial fibrillation ([address]     Cancer ([address]     prostate     Hyperlipidemia     Hypertension     Syncope and collapse      Past Surgical History:   Procedure Laterality Date    CARDIOVERSION  2/22/2010    [facility].  CARDIOVERSION  8/11/2011    Successful to [facility]R at [facility].     CATARACT REMOVAL WITH IMPLANT      COLONOSCOPY  09/19/2007    REDUNDANT COLON- [address]    DOPPLER ECHOCARDIOGRAPHY  08/29/2019    Dr Lisa Walsh abnormal left ventricular systolic function--mod severe tricuspid regurg w/mild pulmonary hyn--mild mitral, aortic, pulmonic Rolling: Minimal assist of 1    Supine to sit: Minimal assist of 1    Sit to supine: Minimal assist of 1    Scooting: Minimal assist of 1     Transfers Sit to stand: Not assessed    Sit to stand: Maximal assist of  2 with use of chux for support      Sit to stand: Moderate assist of 1     Ambulation    not assessed    2 small sidesteps toward head of bed with use of wheeled walker with cues for upright posture and assist to weight shift  50 feet using  wheeled walker with Minimal assist of 1    Stair negotiation: ascended and descended   Not assessed       3 steps with rail  Min a   ROM Within functional limits       Strength BUE:  2/5  RLE:  2/5  LLE:  2/5   Increase strength in affected mm groups by 1/3 grade   Balance Sitting EOB:  not assessed    Dynamic Standing:  not assessed   Sitting EOB: fair - with pt leaning forward and improving with verbal and tactile cues   Dynamic Standing: poor    Sitting EOB:  good    Dynamic Standing: fair       Patient is   & Oriented x person and place and follows one step directions   Sensation:  Patient  denies numbness and tingling     Edema:  yes left lower extremity    Endurance: poor         Patient education  Patient educated on role of Physical Therapy, risks of immobility, safety and plan of care,  importance of mobility while in hospital , hip precautions and weight bearing status     Patient was educated and facilitated on techniques to increase safety and independence with bed mobility, balance, functional transfers, and functional mobility. Patient was explained the the benefits of mobility and risks of immobility. Patient response to education:   Pt verbalized understanding Pt demonstrated skill Pt requires further education in this area   Yes Partial Yes      Treatment:  Patient practiced and was instructed/facilitated in the following treatment: Patient   transferred to side of bed. . Nursing present for medication while pt at side of bed.  Pt stood and Exercises to improve hip and knee control   -Endurance: Utilize Supervised activities to increase level of endurance to allow for safe functional mobility including transfers and gait     Patient and or family understand(s) diagnosis, prognosis, and plan of care. Frequency of treatments: Patient will be seen  twice daily.        Time in 230  Time out  312    Total Treatment Time  42 minutes       CPT codes:     Therapeutic activities (00367)   23 minutes  2 unit(s)  Non billable time 19 minutes    [place], [place]  [id number]

## 2021-03-23 NOTE — PROGRESS NOTES
Speech Language Pathology      NAME:  Pa Plunkett  :  1930  DATE: 3/23/2021  ROOM:  [address] [place] [address]     Patient seen for dysphagia therapy 25 minutes. Patient initially being fed by SLP then fed self remainder of items trialed. Pt is much more alert and verbalizing consistently and appropriately. Pt tolerated all presented PO trials with initially a latent throat clear due to dry oral cavity. As trials of items progressed no further throat clearing present. Pt was able to achieve adequate cohesive bolus prep as evidenced by satisfactory mastication patterns with min oral stasis. SLP recommends a Dysphagia 3, Soft/advanced (Soft & Bite-sized) solids with  thin liquids. If silent aspiration is suspected, please obtain physician order for MBSS. SLP will follow for dysphagia management to monitor tolerance for prescribed diet and implementation of safe swallowing compensatory strategies as clinically indicated.  Daughter present and reports no history of dysphagia   Discussed recommendations with patient daughter and nursing     Left displaced femoral neck fracture [facility]) Connie Evans    [phone]  dysphagia tx    Quinten Del Rosario MSCCC/SLP  Speech Language Pathologist  [phone]

## 2021-03-24 LAB
ANION GAP SERPL CALCULATED.3IONS-SCNC: 10 MMOL/L (ref 7–16)
BUN BLDV-MCNC: 30 MG/DL (ref 8–23)
CALCIUM SERPL-MCNC: 8.1 MG/DL (ref 8.6–10.2)
CHLORIDE BLD-SCNC: 107 MMOL/L (ref 98–107)
CO2: 25 MMOL/L (ref 22–29)
CREAT SERPL-MCNC: 0.8 MG/DL (ref 0.7–1.2)
GFR AFRICAN AMERICAN: >60
GFR NON-AFRICAN AMERICAN: >60 ML/MIN/1.73
GLUCOSE BLD-MCNC: 106 MG/DL (ref 74–99)
INR BLD: 3.5
POTASSIUM SERPL-SCNC: 3.2 MMOL/L (ref 3.5–5)
PROTHROMBIN TIME: 41.4 SEC (ref 9.3–12.4)
SARS-COV-2, PCR: NOT DETECTED
SODIUM BLD-SCNC: 142 MMOL/L (ref 132–146)

## 2021-03-24 PROCEDURE — 99232 SBSQ HOSP IP/OBS MODERATE 35: CPT | Performed by: INTERNAL MEDICINE

## 2021-03-24 PROCEDURE — 97110 THERAPEUTIC EXERCISES: CPT

## 2021-03-24 PROCEDURE — 97535 SELF CARE MNGMENT TRAINING: CPT

## 2021-03-24 PROCEDURE — 6370000000 HC RX 637 (ALT 250 FOR IP): Performed by: INTERNAL MEDICINE

## 2021-03-24 PROCEDURE — 2580000003 HC RX 258: Performed by: INTERNAL MEDICINE

## 2021-03-24 PROCEDURE — 2580000003 HC RX 258: Performed by: ORTHOPAEDIC SURGERY

## 2021-03-24 PROCEDURE — 6370000000 HC RX 637 (ALT 250 FOR IP): Performed by: ORTHOPAEDIC SURGERY

## 2021-03-24 PROCEDURE — 85610 PROTHROMBIN TIME: CPT

## 2021-03-24 PROCEDURE — 97530 THERAPEUTIC ACTIVITIES: CPT

## 2021-03-24 PROCEDURE — 80048 BASIC METABOLIC PNL TOTAL CA: CPT

## 2021-03-24 PROCEDURE — 1200000000 HC SEMI PRIVATE

## 2021-03-24 PROCEDURE — 92526 ORAL FUNCTION THERAPY: CPT | Performed by: SPEECH-LANGUAGE PATHOLOGIST

## 2021-03-24 PROCEDURE — 6360000002 HC RX W HCPCS: Performed by: ORTHOPAEDIC SURGERY

## 2021-03-24 PROCEDURE — 6360000002 HC RX W HCPCS: Performed by: INTERNAL MEDICINE

## 2021-03-24 PROCEDURE — 36415 COLL VENOUS BLD VENIPUNCTURE: CPT

## 2021-03-24 RX ORDER — KETOROLAC TROMETHAMINE 15 MG/ML
15 INJECTION, SOLUTION INTRAMUSCULAR; INTRAVENOUS ONCE
Status: COMPLETED | OUTPATIENT
Start: 2021-03-24 | End: 2021-03-24

## 2021-03-24 RX ADMIN — DOCUSATE SODIUM 50 MG AND SENNOSIDES 8.6 MG 1 TABLET: 8.6; 5 TABLET, FILM COATED ORAL at 21:21

## 2021-03-24 RX ADMIN — DOCUSATE SODIUM 50 MG AND SENNOSIDES 8.6 MG 1 TABLET: 8.6; 5 TABLET, FILM COATED ORAL at 11:07

## 2021-03-24 RX ADMIN — KETOROLAC TROMETHAMINE 15 MG: 15 INJECTION, SOLUTION INTRAMUSCULAR; INTRAVENOUS at 15:05

## 2021-03-24 RX ADMIN — DOXYCYCLINE HYCLATE 100 MG: 100 CAPSULE ORAL at 11:08

## 2021-03-24 RX ADMIN — CLONAZEPAM 0.5 MG: 1 TABLET ORAL at 21:21

## 2021-03-24 RX ADMIN — SODIUM CHLORIDE 25 ML: 9 INJECTION, SOLUTION INTRAVENOUS at 00:54

## 2021-03-24 RX ADMIN — Medication 10 ML: at 21:24

## 2021-03-24 RX ADMIN — PIPERACILLIN SODIUM AND TAZOBACTAM SODIUM 3375 MG: 3; .375 INJECTION, POWDER, LYOPHILIZED, FOR SOLUTION INTRAVENOUS at 05:54

## 2021-03-24 RX ADMIN — ACETAMINOPHEN 500 MG: 500 TABLET ORAL at 13:12

## 2021-03-24 ASSESSMENT — PAIN SCALES - GENERAL
PAINLEVEL_OUTOF10: 0
PAINLEVEL_OUTOF10: 10
PAINLEVEL_OUTOF10: 0
PAINLEVEL_OUTOF10: 5
PAINLEVEL_OUTOF10: 0

## 2021-03-24 NOTE — CARE COORDINATION
SS NOTE: COVID NEGATIVE 3/22/21: SW spoke with Liaison from [facility] and they are beginning [address] today- will await result. For the SNF pt will need a PRECERT, signed JAMILA, current PT/OT and SW completed the HENs. Elizabeth Busch. 3/24/2021.10:03 AM.

## 2021-03-24 NOTE — PROGRESS NOTES
Department of Internal Medicine  General Internal Medicine  Attending Progress Note    Chief Complaint   Patient presents with    Fall     fell backwards off step stool, -loc +thinners, hit head on cabinets, pain to left hip       SUBJECTIVE:    More awake today. Denied fever and chills. No chest pain. Aware of plans to start working on discharge to SNF. Discussed plan of care with daughter especially decision to stop antibiotics.      OBJECTIVE      Medications    Current Facility-Administered Medications: warfarin (COUMADIN) tablet 5 mg, 5 mg, Oral, Every Other Day  warfarin (COUMADIN) tablet 4 mg, 4 mg, Oral, Every Other Day  morphine (PF) injection 2 mg, 2 mg, Intravenous, Q4H PRN  sodium chloride flush 0.9 % injection 10 mL, 10 mL, Intravenous, 2 times per day  sodium chloride flush 0.9 % injection 10 mL, 10 mL, Intravenous, PRN  sennosides-docusate sodium (SENOKOT-S) 8.6-50 MG tablet 1 tablet, 1 tablet, Oral, BID  magnesium hydroxide (MILK OF MAGNESIA) 400 MG/5ML suspension 30 mL, 30 mL, Oral, Daily PRN  perflutren lipid microspheres (DEFINITY) injection 1.65 mg, 1.5 mL, Intravenous, ONCE PRN  atorvastatin (LIPITOR) tablet 10 mg, 10 mg, Oral, Every Other Day  clonazePAM (KLONOPIN) tablet 0.5 mg, 0.5 mg, Oral, Daily  promethazine (PHENERGAN) tablet 12.5 mg, 12.5 mg, Oral, Q6H PRN **OR** ondansetron (ZOFRAN) injection 4 mg, 4 mg, Intravenous, Q6H PRN  polyethylene glycol (GLYCOLAX) packet 17 g, 17 g, Oral, Daily PRN  traMADol (ULTRAM) tablet 50 mg, 50 mg, Oral, Q6H PRN **OR** traMADol (ULTRAM) tablet 100 mg, 100 mg, Oral, Q6H PRN  acetaminophen (TYLENOL) tablet 500 mg, 500 mg, Oral, Q6H PRN  Physical    VITALS:  BP (!) 143/65   Pulse 87   Temp 98.6 °F (37 °C) (Oral)   Resp 20   Ht 5' 6\" (1.676 m)   Wt 163 lb 2.3 oz (74 kg)   SpO2 96%   BMI 26.33 kg/m²   CONSTITUTIONAL:  awake, alert, cooperative, no apparent distress, and appears stated age  EYES:  Lids and lashes normal, pupils equal, round and reactive is controlled  INR is mildly supratherapeutic. Abx may have contributed. Hold Coumadin today  Check INR in am    3. Lethargy: much improved  Related to post anesthesia most likely  No need to continue abx as there is no concrete evidence to support infection    4. Hyperlipidemia: on statin. 5. Anemia: mild  May be related to post op loss.   Continue to monitor  hgb currently stable at 9.8

## 2021-03-24 NOTE — PLAN OF CARE
Problem: Pain:  Goal: Pain level will decrease  Description: Pain level will decrease  3/24/2021 0746 by Araceli Valerio RN  Outcome: Met This Shift     Problem: Pain:  Goal: Control of acute pain  Description: Control of acute pain  3/24/2021 0746 by Araceli Valerio RN  Outcome: Met This Shift     Problem: Pain:  Goal: Control of chronic pain  Description: Control of chronic pain  Outcome: Met This Shift     Problem: Falls - Risk of:  Goal: Will remain free from falls  Description: Will remain free from falls  3/24/2021 0746 by Araceli Valerio RN  Outcome: Met This Shift     Problem: Falls - Risk of:  Goal: Absence of physical injury  Description: Absence of physical injury  3/24/2021 0746 by Araceli Valerio RN  Outcome: Met This Shift     Problem: SAFETY  Goal: Free from accidental physical injury  Outcome: Met This Shift     Problem: DAILY CARE  Goal: Daily care needs are met  Outcome: Met This Shift     Problem: DISCHARGE BARRIERS  Goal: Patient's continuum of care needs are met  Outcome: Met This Shift     Problem: Skin Integrity:  Goal: Will show no infection signs and symptoms  Description: Will show no infection signs and symptoms  Outcome: Met This Shift     Problem: Skin Integrity:  Goal: Absence of new skin breakdown  Description: Absence of new skin breakdown  Outcome: Met This Shift     Problem: SKIN INTEGRITY  Goal: Skin integrity is maintained or improved  Outcome: Ongoing

## 2021-03-24 NOTE — PROGRESS NOTES
OT BEDSIDE TREATMENT NOTE      Date:3/24/2021  Patient Name: Farooq Lundberg  MRN: [id number]  : 1930  Room: [address] [place] [address]     Referring Provider: Masood Titus MD  Evaluating OT: Billie Baumgarten OTR/L [id number]     Placement Recommendation: Inpatient Rehab    Recommended Adaptive Equipment: TBD at rehab      [facility]   AM-PAC Daily Activity Inpatient   How much help for putting on and taking off regular lower body clothing?: Total  How much help for Bathing?: A Lot  How much help for Toileting?: Total  How much help for putting on and taking off regular upper body clothing?: A Lot  How much help for taking care of personal grooming?: A Lot  How much help for eating meals?: A Little  AM-[facility] Inpatient Daily Activity Raw Score: 11  AM-PAC Inpatient ADL T-Scale Score : 29.04  ADL Inpatient CMS 0-100% Score: 70.42  ADL Inpatient CMS G-Code Modifier : CL     Based on patient's functional performance as stated below and their level of assistance needed prior to admission, this therapist believes that the patient would benefit from skilled Occupational Therapy following their hospital stay in an effort to increase safety and independence with completion of ADL/IADL tasks for functional independence and quality of life.      Diagnosis:   1. Left displaced femoral neck fracture ([facility]    2. Fall, initial encounter       Pertinent Medical History:   Past Medical History        Past Medical History:   Diagnosis Date    Atrial fibrillation ([facility]      Cancer ([facility]       prostate     Hyperlipidemia      Hypertension      Syncope and collapse           Precautions:  falls, full weight bearing: L LE, Hip Precautions, rash to R flank area    Pain Scale: Numeric Rate:unquantified pain in L hip and pain in neck, as well; pt holding neck in L lateral flexion; Nursing notified. Social history: with family: spouse    Home architecture: single family home, 1 story, 3 steps to enter with rail, tub shower and walk in shower.    PLOF: independent with BADL and independent with IADL, pt ambulated with no device   Equipment owned: wheeled walker, cane, shower chair, grab bars  Cognition: A&O x 3; follows 1 step directions. fair  Problem solving skills              fair  Memory               fair  Sequencing              fair  Judgement/safety  Communication: intact   Visual perceptual skills: intact                Glasses: yes   Edema: no     Sensation: intact   Hand Dominance:  Left     X Right       Left Right Comment   Passive range of motion [facility]      Active range of motion [facility]      Muscle Grade 4/5 4/5     /pinch Strength Intact  Intact         Functional Assessment:    Initial Evaluation Status Date:   3/23/2021 Treatment Status  Date: 3/24/2021 STGs = LTGs  Time frame: 5 - 14 days   Feeding Minimal Assist to bring cup to mouth to take sips of water at edge of bed  Min A to manage packages and containers; pt able to hold cup and bring to mouth, as well as to bring utensil to mouth; pt's food is cut up for him Independent    Grooming Maximal Assist   N/T Supervision    UB Dressing Maximal Assist to tie gown  Max A to tie back of gown Supervision    LB Dressing Dependent  Dep to don/doff socks and heelbos Modified Wilber    Bathing Maximal Assist  N/T Minimal Assist    Toileting Dependent for hygiene   N/T Minimal Assist    Bed Mobility  Facilitated Supine to sit: Maximal Assist x 2   Scooting:Maximal Assist x 2   Sit to supine: Maximal Assist x 2    Rolling: Maximal Assist x 2 time three reps to adjust linens, hygiene, and to assist nursing in measuring rash on R flank area.    Max A x 2 for supine to sit; max A x 2 for scooting; max A x 2 for sit to supine; assist to guide UB and LE's to/from supine; positioning of LLE on RTB; SCD's donned; all needs within reach Supine to sit: Supervision   Scooting:Supervision  Sit to supine: Supervision   Rolling: Supervision   Functional Transfers Maximal Assist x 2 from EOB to wheeled walker with use of chux as sling. Transfer training with verbal cues for hand placement throughout session to improve safety.   Mod A x 2 progressing to max a x 2 for sit to stand transfers x 3 reps from EOB; use of chux pad as sling; pt demo's L lateral lean with cuing and assist for upright position Minimal Assist     Functional Mobility Maximal Assist x 2 with wheeled walker to improve balance to side step 1 foot towards head of bed, verbal cues for walker sequence and safety. Assistance for walker negotiation  N/T Modified [place]re    Activity Tolerance fair  fair minus good        Comments: Patient cleared by nursing staff. Upon arrival pt supine in bed. Pt agreeable to OT tx session. Patient required partial cotreatment with physical therapy due to the level of physical assistance needed and the patients endurance level to tolerate separate sessions. Pt educated with regards to bed mobility, hand placement, safety awareness, static sitting balance, sit to stand transfer training, standing balance, LE/UE dressing, ECT's, self feeding. At end of session pt seated in bed with HOB elevated; pt demo'd coughing with water, SLP notified; All lines and tubes intact, call light within reach. Overall, pt demonstrated decreased independence and safety during completion of ADL/functional transfers/mobility tasks. Pt would benefit from continued skilled OT to increase safety and independence with completion of ADL/IADL tasks for functional independence and quality of life. Pt required cues and education as noted above for safe facilitation and completion of tasks. Therapist provided skilled monitoring of patient's response during treatment session. Prior to and at the end of session, environmental modifications/line management completed for patients safety and efficiency of treatment session. Overall, patient demonstrates moderate difficulties with completion of BADLs and IADLs.  Factors

## 2021-03-24 NOTE — PLAN OF CARE
Problem: Pain:  Description: Pain management should include both nonpharmacologic and pharmacologic interventions. Goal: Pain level will decrease  Description: Pain level will decrease  3/24/2021 1940 by Martha Simental RN  Outcome: Met This Shift  3/24/2021 0746 by Yuri Anand RN  Outcome: Met This Shift  Goal: Control of acute pain  Description: Control of acute pain  3/24/2021 1940 by Martha Simental RN  Outcome: Met This Shift  3/24/2021 0746 by Yuri Anand RN  Outcome: Met This Shift  Goal: Control of chronic pain  Description: Control of chronic pain  3/24/2021 1940 by Martha Simental RN  Outcome: Met This Shift  3/24/2021 0746 by Yuri Anand RN  Outcome: Met This Shift     Problem: Falls - Risk of:  Goal: Will remain free from falls  Description: Will remain free from falls  3/24/2021 1940 by Martha Simental RN  Outcome: Met This Shift  3/24/2021 0746 by Yuri Anand RN  Outcome: Met This Shift  Goal: Absence of physical injury  Description: Absence of physical injury  3/24/2021 1940 by Martha Simental RN  Outcome: Met This Shift  3/24/2021 0746 by Yuri Anand RN  Outcome: Met This Shift     Problem: SAFETY  Goal: Free from accidental physical injury  3/24/2021 1940 by Martha Simental RN  Outcome: Met This Shift  3/24/2021 0746 by Yuri Anand RN  Outcome: Met This Shift     Problem: DAILY CARE  Goal: Daily care needs are met  3/24/2021 1940 by Martha Simental RN  Outcome: Met This Shift  3/24/2021 0746 by Yuri Anand RN  Outcome: Met This Shift     Problem: SKIN INTEGRITY  Goal: Skin integrity is maintained or improved  3/24/2021 1940 by Martha Simental RN  Outcome: Met This Shift  3/24/2021 0746 by Yuri Anand RN  Outcome: Ongoing     Problem: KNOWLEDGE DEFICIT  Goal: Patient/S.O. demonstrates understanding of disease process, treatment plan, medications, and discharge instructions.   3/24/2021 1940 by Martha Simental RN  Outcome: Met This Shift  3/24/2021 0746 by Jamila Cardona RN  Outcome: Ongoing     Problem: DISCHARGE BARRIERS  Goal: Patient's continuum of care needs are met  3/24/2021 1940 by Jose Valerio RN  Outcome: Met This Shift  3/24/2021 0746 by Jamila Cardona RN  Outcome: Met This Shift     Problem: Skin Integrity:  Goal: Will show no infection signs and symptoms  Description: Will show no infection signs and symptoms  3/24/2021 1940 by Jose Valerio RN  Outcome: Met This Shift  3/24/2021 0746 by Jamila Cardona RN  Outcome: Met This Shift  Goal: Absence of new skin breakdown  Description: Absence of new skin breakdown  3/24/2021 1940 by Jose Valerio RN  Outcome: Met This Shift  3/24/2021 0746 by Jamila Cardona RN  Outcome: Met This Shift

## 2021-03-24 NOTE — PLAN OF CARE
Problem: Pain:  Goal: Pain level will decrease  Description: Pain level will decrease  Outcome: Ongoing     Problem: Pain:  Goal: Control of acute pain  Description: Control of acute pain  Outcome: Ongoing     Problem: Falls - Risk of:  Goal: Will remain free from falls  Description: Will remain free from falls  Outcome: Ongoing     Problem: Falls - Risk of:  Goal: Absence of physical injury  Description: Absence of physical injury  Outcome: Ongoing

## 2021-03-24 NOTE — PROGRESS NOTES
Physical Therapy    Physical Therapy Treatment Note    Room #:  5922/0333-31  Patient Name: Delvin Crane  YOB: 1930  MRN: [id number]    Referring Provider:   Jessica Starr MD     Date of Service: 3/24/2021    Evaluating Physical Therapist: Emi Colon, PT  #[id number]       Diagnosis:   Left displaced femoral neck fracture ([address] [S72.002A]   D/t fall  S.p left hemiarthroplasty    Patient Active Problem List   Diagnosis    A-fib ([address]    Chronic anticoagulation    Hypertension    H/O prostate cancer    Orthostatic hypotension    Sinus bradycardia-tachycardia syndrome ([address]    Pacemaker    Hyperlipidemia    Vitamin B12 deficiency    Vitamin D deficiency    Closed fracture of multiple pubic rami, right, initial encounter ([address]    COPD (chronic obstructive pulmonary disease) ([address]    Acute bronchitis    Acute respiratory failure with hypoxia ([address]    Closed fracture of ramus of right pubis (HCC)    Back pain    Closed compression fracture of L1 lumbar vertebra    Sacral fracture, closed ([address]    Left displaced femoral neck fracture ([address]        Tentative placement recommendation: Subacute rehab    Equipment recommendation: To be determined      Prior Level of Function: Patient ambulated independently    Rehab Potential: good    for baseline    Past medical history:   Past Medical History:   Diagnosis Date    Atrial fibrillation ([address]     Cancer ([address]     prostate     Hyperlipidemia     Hypertension     Syncope and collapse      Past Surgical History:   Procedure Laterality Date    CARDIOVERSION  2/22/2010    Successful to NSR at [facility].  CARDIOVERSION  8/11/2011    Successful to [facility]R at [facility].     CATARACT REMOVAL WITH IMPLANT      COLONOSCOPY  09/19/2007    REDUNDANT COLON- [address]    DOPPLER ECHOCARDIOGRAPHY  08/29/2019    Dr Sendy Berry abnormal left ventricular systolic function--mod severe tricuspid regurg w/mild pulmonary hyn--mild mitral, aortic, pulmonic regurg.  EYE SURGERY      CATARACT    HIP SURGERY Left 3/21/2021    LEFT HIP HEMIARTHROPLASTY [facility]) performed by Yousuf Dickinson MD at [address] [address] Left 2-8-2013    Dual chamber-Dr. 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, alarm and pacemaker , weight bearing as tolerated Left lower extremity ,  SUBJECTIVE:    Social history: Patient lives with spouse   in a ranch home  with 3 steps  to enter with [employer] in shower grab bars    Equipment owned: Ebenezer Guo,       [address] [facility]   How much difficulty turning over in bed?: A Lot  How much difficulty sitting down on / standing up from a chair with arms?: A Lot  How much difficulty moving from lying on back to sitting on side of bed?: A Lot  How much help from another person moving to and from a bed to a chair?: A Lot  How much help from another person needed to walk in hospital room?: Total  How much help from another person for climbing 3-5 steps with a railing?: Total  AM-PAC Inpatient Mobility Raw Score : 10  AM-PAC Inpatient T-Scale Score : 32.29  Mobility Inpatient CMS 0-100% Score: 76.75  Mobility Inpatient CMS G-Code Modifier : CL    Nursing cleared patient for PT treatment. pt agreed to therapy  , agrees to supine ex this session     OBJECTIVE;   Initial Evaluation  Date: 3/21/2021 Treatment Date:  3/24/2021      Short Term/ Long Term   Goals   Was pt agreeable to Eval/treatment?  Yes   yes To be met in 3 days   Pain level   0/10    Not rated    Bed Mobility    Rolling: Maximal assist of 1    Supine to sit: Not assessed     Sit to supine: Not assessed     Scooting: Not assessed    Rolling: Not assessed     Supine to sit: Not assessed     Sit to supine: Not assessed     Scooting: Not assessed     Rolling: Minimal assist of 1    Supine to sit: Minimal assist of 1    Sit to supine: Minimal assist of 1    Scooting: Minimal assist of 1     Transfers Sit to stand: Not assessed    Sit to stand: Not assessed       Sit to stand: Moderate assist of 1     Ambulation    not assessed    not assessed      50 feet using  wheeled walker with Minimal assist of 1    Stair negotiation: ascended and descended   Not assessed       3 steps with rail  Min a   ROM Within functional limits       Strength BUE:  2/5  RLE:  2/5  LLE:  2/5   Increase strength in affected mm groups by 1/3 grade   Balance Sitting EOB:  not assessed    Dynamic Standing:  not assessed   Sitting EOB: not assessed    Dynamic Standing: not assessed    Sitting EOB:  good    Dynamic Standing: fair       Patient is   & Oriented x person and place and follows one step directions   Sensation:  Patient  denies numbness and tingling     Edema:  yes left lower extremity    Endurance: poor         Patient education  Patient educated on role of Physical Therapy, risks of immobility, safety and plan of care,  importance of mobility while in hospital , hip precautions and weight bearing status     Patient was educated and facilitated on techniques to increase safety and independence with bed mobility, balance, functional transfers, and functional mobility. Patient was explained the the benefits of mobility and risks of immobility. Patient response to education:   Pt verbalized understanding Pt demonstrated skill Pt requires further education in this area   Yes Partial Yes      Treatment:  Patient practiced and was instructed/facilitated in the following treatment: Patient   performed assisted supine ex  . Therapeutic Exercises:  ankle pumps, heel slide, hip abduction/adduction and straight leg raise   X 20 reps. At end of session, patient in bed with alarm call light and phone within reach,   all lines and tubes intact, nursing notified.       Patient would benefit from continued skilled Physical Therapy to improve functional independence and quality of life. Patient's/ family goals   none stated        ASSESSMENT: Patient exhibits decreased strength, balance, endurance, range of motion and pain left lower extremity impairing functional mobility, transfers, gait , tolerance to activity and participation  Pt with increased alertness and ability to participate with supine ex   Plan of Care:     -Bed Mobility: Lower extremity exercises , Upper extremity exercises  and Trunk control activities   -Sitting Balance: Incorporate reaching activities to activate trunk muscles , Hands on support to maintain midline  and Facilitate postural control in all planes   -Standing Balance: Perform strengthening exercises in standing to promote motor control with or without upper extremity support  and Instruct patient on adequate base of support to maintain balance  -Transfers: Provide instruction on proper hand and foot position for adequate transfer of weight onto lower extremities and use of gait device, Cues for hand placement, technique and safety, Facilitate weight shift forward on to lower extremities and provide necessary stabilization of bilateral lower extremities , Support transfer of weight on to lower extremities, Assist with extension of knees trunk and hip to accept weight transfer  and Provide stabilization to prevent fall   -Gait: Gait training, Standing activities to improve: base of support, weight shift, weight bearing , Exercises to improve trunk control and Exercises to improve hip and knee control   -Endurance: Utilize Supervised activities to increase level of endurance to allow for safe functional mobility including transfers and gait     Patient and or family understand(s) diagnosis, prognosis, and plan of care. Frequency of treatments: Patient will be seen  twice daily.        Time in 837  Time out  848    Total Treatment Time 11  minutes       CPT codes:     Therapeutic exercises (73096)   11 minutes  1 unit(s)    [place], [place]  [address]

## 2021-03-24 NOTE — PROGRESS NOTES
Physical Therapy    Physical Therapy Treatment Note    Room #:  1012/1236-65  Patient Name: Ruslan London  YOB: 1930  MRN: [id number]    Referring Provider:   Claudetta Rouge, MD     Date of Service: 3/24/2021    Evaluating Physical Therapist: Tom Beltran, PT  #[id number]       Diagnosis:   Left displaced femoral neck fracture ([address] [S72.002A]   D/t fall  S.p left hemiarthroplasty    Patient Active Problem List   Diagnosis    A-fib ([address]    Chronic anticoagulation    Hypertension    H/O prostate cancer    Orthostatic hypotension    Sinus bradycardia-tachycardia syndrome ([address]    Pacemaker    Hyperlipidemia    Vitamin B12 deficiency    Vitamin D deficiency    Closed fracture of multiple pubic rami, right, initial encounter ([address]    COPD (chronic obstructive pulmonary disease) ([address]    Acute bronchitis    Acute respiratory failure with hypoxia ([address]    Closed fracture of ramus of right pubis (HCC)    Back pain    Closed compression fracture of L1 lumbar vertebra    Sacral fracture, closed ([address]    Left displaced femoral neck fracture ([address]        Tentative placement recommendation: Subacute rehab    Equipment recommendation: To be determined      Prior Level of Function: Patient ambulated independently    Rehab Potential: good    for baseline    Past medical history:   Past Medical History:   Diagnosis Date    Atrial fibrillation ([address]     Cancer ([address]     prostate     Hyperlipidemia     Hypertension     Syncope and collapse      Past Surgical History:   Procedure Laterality Date    CARDIOVERSION  2/22/2010    Successful to [facility]R at [facility].  CARDIOVERSION  8/11/2011    Successful to [facility]R at [facility].     CATARACT REMOVAL WITH IMPLANT      COLONOSCOPY  09/19/2007    REDUNDANT COLON- [address]    DOPPLER ECHOCARDIOGRAPHY  08/29/2019    Dr Jazzmine Quan abnormal left ventricular systolic function--mod severe tricuspid regurg w/mild pulmonary hyn--mild mitral, aortic, pulmonic regurg.  EYE SURGERY      CATARACT    HIP SURGERY Left 3/21/2021    LEFT H[facility]ARTHROPLASTY [facility] performed by Ammon Mackey MD at [address] [address] Left 2-8-2013    Dual chamber-Dr. 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, alarm and pacemaker , weight bearing as tolerated Left lower extremity ,  SUBJECTIVE:    Social history: Patient lives with spouse   in a ranch home  with 3 steps  to enter with [employer] in shower grab bars    Equipment owned: Luan Gunter,       [address]   How much difficulty turning over in bed?: A Lot  How much difficulty sitting down on / standing up from a chair with arms?: A Lot  How much difficulty moving from lying on back to sitting on side of bed?: A Lot  How much help from another person moving to and from a bed to a chair?: A Lot  How much help from another person needed to walk in hospital room?: Total  How much help from another person for climbing 3-5 steps with a railing?: Total  AM-PAC Inpatient Mobility Raw Score : 10  AM-PAC Inpatient T-Scale Score : 32.29  Mobility Inpatient CMS 0-100% Score: 76.75  Mobility Inpatient CMS G-Code Modifier : CL    Nursing cleared patient for PT treatment. pt agreed to therapy      OBJECTIVE;   Initial Evaluation  Date: 3/21/2021 Treatment Date:  3/24/2021      Short Term/ Long Term   Goals   Was pt agreeable to Eval/treatment?  Yes   yes To be met in 3 days   Pain level   0/10    Not rated    Bed Mobility    Rolling: Maximal assist of 1    Supine to sit: Not assessed     Sit to supine: Not assessed     Scooting: Not assessed    Rolling: Maximal assist of  2    Supine to sit: Maximal assist of  2    Sit to supine: Maximal assist of  2    Scooting: Not assessed     Rolling: Minimal assist of 1    Supine to sit: Minimal assist of 1    Sit to supine: Minimal assist of 1    Scooting: Minimal assist of 1     Transfers Sit to stand: Not assessed    Sit to stand: Maximal assist of  2 with use of chux for support      Sit to stand: Moderate assist of 1     Ambulation    not assessed    not assessed      50 feet using  wheeled walker with Minimal assist of 1    Stair negotiation: ascended and descended   Not assessed       3 steps with rail  Min a   ROM Within functional limits       Strength BUE:  2/5  RLE:  2/5  LLE:  2/5   Increase strength in affected mm groups by 1/3 grade   Balance Sitting EOB:  not assessed    Dynamic Standing:  not assessed   Sitting EOB: fair - with pt leaning forward and improving with verbal and tactile cues   Dynamic Standing: poor   With walker  Sitting EOB:  good    Dynamic Standing: fair       Patient is   & Oriented x person and place and follows one step directions   Sensation:  Patient  denies numbness and tingling     Edema:  yes left lower extremity    Endurance: poor         Patient education  Patient educated on role of Physical Therapy, risks of immobility, safety and plan of care,  importance of mobility while in hospital , hip precautions and weight bearing status     Patient was educated and facilitated on techniques to increase safety and independence with bed mobility, balance, functional transfers, and functional mobility. Patient was explained the the benefits of mobility and risks of immobility. Patient response to education:   Pt verbalized understanding Pt demonstrated skill Pt requires further education in this area   Yes Partial Yes      Treatment:  Patient practiced and was instructed/facilitated in the following treatment: Patient   transferred to side of bed. .pt at side of bed x 10 min. Pt rolled for replacing chux pad . Pt in supine with alarm activated at end of session and OT present    Therapeutic Exercises:  ankle pumps   X 20 reps.     At end of session, patient in bed with alarm call light and phone within reach,   all lines and tubes intact, nursing notified. Patient would benefit from continued skilled Physical Therapy to improve functional independence and quality of life. Patient required co-treat with occupational therapy due to the level of physical assistance needed and the patients endurance level to tolerate separate sessions. Patient's/ family goals   none stated        ASSESSMENT: Patient exhibits decreased strength, balance, endurance, range of motion and pain left lower extremity impairing functional mobility, transfers, gait , tolerance to activity and participation  Pt with increased alertness and ability to participate and perform function. Increased activity .    Plan of Care:     -Bed Mobility: Lower extremity exercises , Upper extremity exercises  and Trunk control activities   -Sitting Balance: Incorporate reaching activities to activate trunk muscles , Hands on support to maintain midline  and Facilitate postural control in all planes   -Standing Balance: Perform strengthening exercises in standing to promote motor control with or without upper extremity support  and Instruct patient on adequate base of support to maintain balance  -Transfers: Provide instruction on proper hand and foot position for adequate transfer of weight onto lower extremities and use of gait device, Cues for hand placement, technique and safety, Facilitate weight shift forward on to lower extremities and provide necessary stabilization of bilateral lower extremities , Support transfer of weight on to lower extremities, Assist with extension of knees trunk and hip to accept weight transfer  and Provide stabilization to prevent fall   -Gait: Gait training, Standing activities to improve: base of support, weight shift, weight bearing , Exercises to improve trunk control and Exercises to improve hip and knee control   -Endurance: Utilize Supervised activities to increase level of endurance to allow for safe functional mobility including transfers and gait     Patient and or family understand(s) diagnosis, prognosis, and plan of care. Frequency of treatments: Patient will be seen  twice daily.        Time in 904  Time out  1021    Total Treatment Time 17   minutes       CPT codes:     Therapeutic activities (14988)   8 minutes  1 unit(s)  Non billable time 9 minutes    [employer], [place]  [address]

## 2021-03-24 NOTE — PROGRESS NOTES
Department of Orthopedic Surgery  Attending Progress Note      SUBJECTIVE  More alert today. He developed some pain in the left side of his neck. No new injury    OBJECTIVE    Physical    VITALS:  BP (!) 143/65   Pulse 87   Temp 98.6 °F (37 °C) (Oral)   Resp 20   Ht 5' 6\" (1.676 m)   Wt 163 lb 2.3 oz (74 kg)   SpO2 96%   BMI 26.33 kg/m²   NVI in ue and le. He has muscle tightness in the left neck muscles. dressing intact and dry    Data    CBC:   Lab Results   Component Value Date    WBC 8.5 03/23/2021    RBC 3.04 03/23/2021    HGB 9.8 03/23/2021    HCT 30.7 03/23/2021    .0 03/23/2021    MCH 32.2 03/23/2021    MCHC 31.9 03/23/2021    RDW 14.7 03/23/2021     03/23/2021    MPV 10.9 03/23/2021     BMP:    Lab Results   Component Value Date     03/24/2021    K 3.2 03/24/2021    K 3.9 03/19/2021     03/24/2021    CO2 25 03/24/2021    BUN 30 03/24/2021    LABALBU 3.8 08/08/2019    LABALBU 4.0 03/05/2012    CREATININE 0.8 03/24/2021    CALCIUM 8.1 03/24/2021    GFRAA >60 03/24/2021    LABGLOM >60 03/24/2021    GLUCOSE 106 03/24/2021    GLUCOSE 115 03/05/2012     PT/INR:    Lab Results   Component Value Date    PROTIME 41.4 03/24/2021    PROTIME 30.2 03/05/2021    INR 3.5 03/24/2021     Current Inpatient Medications    Current Facility-Administered Medications: ketorolac (TORADOL) injection 15 mg, 15 mg, Intravenous, Once  warfarin (COUMADIN) tablet 5 mg, 5 mg, Oral, Every Other Day  warfarin (COUMADIN) tablet 4 mg, 4 mg, Oral, Every Other Day  morphine (PF) injection 2 mg, 2 mg, Intravenous, Q4H PRN  sodium chloride flush 0.9 % injection 10 mL, 10 mL, Intravenous, 2 times per day  sodium chloride flush 0.9 % injection 10 mL, 10 mL, Intravenous, PRN  sennosides-docusate sodium (SENOKOT-S) 8.6-50 MG tablet 1 tablet, 1 tablet, Oral, BID  magnesium hydroxide (MILK OF MAGNESIA) 400 MG/5ML suspension 30 mL, 30 mL, Oral, Daily PRN  perflutren lipid microspheres (DEFINITY) injection 1.65 mg, 1.5 mL, Intravenous, ONCE PRN  atorvastatin (LIPITOR) tablet 10 mg, 10 mg, Oral, Every Other Day  clonazePAM (KLONOPIN) tablet 0.5 mg, 0.5 mg, Oral, Daily  promethazine (PHENERGAN) tablet 12.5 mg, 12.5 mg, Oral, Q6H PRN **OR** ondansetron (ZOFRAN) injection 4 mg, 4 mg, Intravenous, Q6H PRN  polyethylene glycol (GLYCOLAX) packet 17 g, 17 g, Oral, Daily PRN  traMADol (ULTRAM) tablet 50 mg, 50 mg, Oral, Q6H PRN **OR** traMADol (ULTRAM) tablet 100 mg, 100 mg, Oral, Q6H PRN  acetaminophen (TYLENOL) tablet 500 mg, 500 mg, Oral, Q6H PRN    ASSESSMENT AND PLAN      Femoral neck fracture    Plan seems to muscle tightness in neck. Had a ct on admission. Will give one dose of toradol. He is more alert so will get orthopedic chair so he can get out of bed. Work on discharge to [facility] for rehab.

## 2021-03-25 LAB
ANION GAP SERPL CALCULATED.3IONS-SCNC: 11 MMOL/L (ref 7–16)
APTT: 44.3 SEC (ref 24.5–35.1)
BUN BLDV-MCNC: 31 MG/DL (ref 8–23)
CALCIUM SERPL-MCNC: 8.4 MG/DL (ref 8.6–10.2)
CHLORIDE BLD-SCNC: 109 MMOL/L (ref 98–107)
CO2: 24 MMOL/L (ref 22–29)
CREAT SERPL-MCNC: 0.7 MG/DL (ref 0.7–1.2)
GFR AFRICAN AMERICAN: >60
GFR NON-AFRICAN AMERICAN: >60 ML/MIN/1.73
GLUCOSE BLD-MCNC: 132 MG/DL (ref 74–99)
INR BLD: 5
POTASSIUM SERPL-SCNC: 3.3 MMOL/L (ref 3.5–5)
PROTHROMBIN TIME: 59.2 SEC (ref 9.3–12.4)
SODIUM BLD-SCNC: 144 MMOL/L (ref 132–146)
URINE CULTURE, ROUTINE: NORMAL

## 2021-03-25 PROCEDURE — 36415 COLL VENOUS BLD VENIPUNCTURE: CPT

## 2021-03-25 PROCEDURE — 2500000003 HC RX 250 WO HCPCS: Performed by: INTERNAL MEDICINE

## 2021-03-25 PROCEDURE — 6370000000 HC RX 637 (ALT 250 FOR IP): Performed by: ORTHOPAEDIC SURGERY

## 2021-03-25 PROCEDURE — 92526 ORAL FUNCTION THERAPY: CPT | Performed by: SPEECH-LANGUAGE PATHOLOGIST

## 2021-03-25 PROCEDURE — 97530 THERAPEUTIC ACTIVITIES: CPT

## 2021-03-25 PROCEDURE — 1200000000 HC SEMI PRIVATE

## 2021-03-25 PROCEDURE — 99233 SBSQ HOSP IP/OBS HIGH 50: CPT | Performed by: INTERNAL MEDICINE

## 2021-03-25 PROCEDURE — 6370000000 HC RX 637 (ALT 250 FOR IP): Performed by: INTERNAL MEDICINE

## 2021-03-25 PROCEDURE — 85730 THROMBOPLASTIN TIME PARTIAL: CPT

## 2021-03-25 PROCEDURE — 97110 THERAPEUTIC EXERCISES: CPT

## 2021-03-25 PROCEDURE — 80048 BASIC METABOLIC PNL TOTAL CA: CPT

## 2021-03-25 PROCEDURE — 85610 PROTHROMBIN TIME: CPT

## 2021-03-25 PROCEDURE — 2580000003 HC RX 258: Performed by: ORTHOPAEDIC SURGERY

## 2021-03-25 RX ORDER — LABETALOL HYDROCHLORIDE 5 MG/ML
5 INJECTION, SOLUTION INTRAVENOUS EVERY 4 HOURS PRN
Status: DISCONTINUED | OUTPATIENT
Start: 2021-03-25 | End: 2021-03-26 | Stop reason: HOSPADM

## 2021-03-25 RX ORDER — PHYTONADIONE 5 MG/1
2.5 TABLET ORAL ONCE
Status: COMPLETED | OUTPATIENT
Start: 2021-03-25 | End: 2021-03-25

## 2021-03-25 RX ADMIN — CLONAZEPAM 0.5 MG: 1 TABLET ORAL at 21:48

## 2021-03-25 RX ADMIN — Medication 10 ML: at 13:46

## 2021-03-25 RX ADMIN — Medication 10 ML: at 21:00

## 2021-03-25 RX ADMIN — ATORVASTATIN CALCIUM 10 MG: 20 TABLET, FILM COATED ORAL at 09:43

## 2021-03-25 RX ADMIN — PHYTONADIONE 2.5 MG: 5 TABLET ORAL at 18:14

## 2021-03-25 RX ADMIN — POLYETHYLENE GLYCOL 3350 17 G: 17 POWDER, FOR SOLUTION ORAL at 12:48

## 2021-03-25 RX ADMIN — LABETALOL HYDROCHLORIDE 5 MG: 5 INJECTION INTRAVENOUS at 03:04

## 2021-03-25 RX ADMIN — DOCUSATE SODIUM 50 MG AND SENNOSIDES 8.6 MG 1 TABLET: 8.6; 5 TABLET, FILM COATED ORAL at 21:48

## 2021-03-25 RX ADMIN — LABETALOL HYDROCHLORIDE 5 MG: 5 INJECTION INTRAVENOUS at 23:28

## 2021-03-25 RX ADMIN — DOCUSATE SODIUM 50 MG AND SENNOSIDES 8.6 MG 1 TABLET: 8.6; 5 TABLET, FILM COATED ORAL at 09:43

## 2021-03-25 ASSESSMENT — PAIN DESCRIPTION - PAIN TYPE
TYPE: ACUTE PAIN
TYPE: ACUTE PAIN

## 2021-03-25 ASSESSMENT — PAIN SCALES - GENERAL
PAINLEVEL_OUTOF10: 0
PAINLEVEL_OUTOF10: 5

## 2021-03-25 ASSESSMENT — PAIN DESCRIPTION - DESCRIPTORS: DESCRIPTORS: ACHING;SORE

## 2021-03-25 ASSESSMENT — PAIN DESCRIPTION - LOCATION: LOCATION: NECK

## 2021-03-25 ASSESSMENT — PAIN DESCRIPTION - ORIENTATION: ORIENTATION: LEFT

## 2021-03-25 NOTE — PROGRESS NOTES
Department of Orthopedic Surgery  Attending Progress Note      SUBJECTIVE  Looks better today and neck much improved    OBJECTIVE    Physical    VITALS:  /78   Pulse 80   Temp 98.7 °F (37.1 °C) (Oral)   Resp 21   Ht 5' 6\" (1.676 m)   Wt 164 lb 3.9 oz (74.5 kg)   SpO2 90%   BMI 26.51 kg/m²   Sitting in chair, nvi.  Calves are soft and nontender    Data    CBC:   Lab Results   Component Value Date    WBC 8.5 03/23/2021    RBC 3.04 03/23/2021    HGB 9.8 03/23/2021    HCT 30.7 03/23/2021    .0 03/23/2021    MCH 32.2 03/23/2021    MCHC 31.9 03/23/2021    RDW 14.7 03/23/2021     03/23/2021    MPV 10.9 03/23/2021     BMP:    Lab Results   Component Value Date     03/24/2021    K 3.2 03/24/2021    K 3.9 03/19/2021     03/24/2021    CO2 25 03/24/2021    BUN 30 03/24/2021    LABALBU 3.8 08/08/2019    LABALBU 4.0 03/05/2012    CREATININE 0.8 03/24/2021    CALCIUM 8.1 03/24/2021    GFRAA >60 03/24/2021    LABGLOM >60 03/24/2021    GLUCOSE 106 03/24/2021    GLUCOSE 115 03/05/2012     PT/INR:    Lab Results   Component Value Date    PROTIME 41.4 03/24/2021    PROTIME 30.2 03/05/2021    INR 3.5 03/24/2021     Current Inpatient Medications    Current Facility-Administered Medications: labetalol (NORMODYNE;TRANDATE) injection 5 mg, 5 mg, Intravenous, Q4H PRN  warfarin (COUMADIN) tablet 5 mg, 5 mg, Oral, Every Other Day  warfarin (COUMADIN) tablet 4 mg, 4 mg, Oral, Every Other Day  morphine (PF) injection 2 mg, 2 mg, Intravenous, Q4H PRN  sodium chloride flush 0.9 % injection 10 mL, 10 mL, Intravenous, 2 times per day  sodium chloride flush 0.9 % injection 10 mL, 10 mL, Intravenous, PRN  sennosides-docusate sodium (SENOKOT-S) 8.6-50 MG tablet 1 tablet, 1 tablet, Oral, BID  magnesium hydroxide (MILK OF MAGNESIA) 400 MG/5ML suspension 30 mL, 30 mL, Oral, Daily PRN  perflutren lipid microspheres (DEFINITY) injection 1.65 mg, 1.5 mL, Intravenous, ONCE PRN  atorvastatin (LIPITOR) tablet 10 mg, 10 mg, Oral, Every Other Day  clonazePAM (KLONOPIN) tablet 0.5 mg, 0.5 mg, Oral, Daily  promethazine (PHENERGAN) tablet 12.5 mg, 12.5 mg, Oral, Q6H PRN **OR** ondansetron (ZOFRAN) injection 4 mg, 4 mg, Intravenous, Q6H PRN  polyethylene glycol (GLYCOLAX) packet 17 g, 17 g, Oral, Daily PRN  traMADol (ULTRAM) tablet 50 mg, 50 mg, Oral, Q6H PRN **OR** traMADol (ULTRAM) tablet 100 mg, 100 mg, Oral, Q6H PRN  acetaminophen (TYLENOL) tablet 500 mg, 500 mg, Oral, Q6H PRN    ASSESSMENT AND PLAN      Femoral neck fracture    Plan:  Discontinue olvera. Looks good in chair. Not eating well, encourage protein shakes. Plan on [facility] for rehab.

## 2021-03-25 NOTE — CARE COORDINATION
Called wife Maribel Lawler for verbal signature for [employer] letter, but no answer.  Electronically signed by Deangelo Alvarez on 3/25/2021 at 10:19 AM

## 2021-03-25 NOTE — PROGRESS NOTES
Speech Language Pathology      NAME:  Lynn Plunkett  :  1930  DATE: 3/25/2021  ROOM:  [address] [place], [place] [address]     Patient seen for dysphagia therapy 30 minutes. Patient feeding self am meal up to chair following PT session. Pt is alert with decreased confusion present hearing continues to hinder interactions at times. Pt tolerated all presented PO items without overt clinical indicators aspiration. Pt was able to achieve adequate cohesive bolus prep as evidenced by satisfactory mastication patterns with min oral stasis. Utilized a liquid chaser effectively occasional throat clear with thin but no blatant coughing.    Will continue POC    Left displaced femoral neck fracture ([facility] Louie Schulte    [id number]  dysphagia tx    Brad Olguin MSCCC/SLP  Speech Language Pathologist  [id number]

## 2021-03-25 NOTE — CARE COORDINATION
SS NOTE: COVID NEGATIVE 3/22/21: [facility] began [place] yesterday and await result. For the SNF pt will need a PRECERT, signed JAMILA, current PT/OT and SW completed the HENs. Lydia Busch. 3/25/2021.10:46 AM.

## 2021-03-25 NOTE — PROGRESS NOTES
Department of Internal Medicine  General Internal Medicine  Attending Progress Note  Chief Complaint   Patient presents with    Fall     fell backwards off step stool, -loc +thinners, hit head on cabinets, pain to left hip       SUBJECTIVE:    Reports that he is doing well. Denied fever and chills. His daughter is at bedside. No complaint. She noted that patient was able to partake in PT. Sat on the chair for about an hour.  Patient also stated that it was a tall bladimir that helped him with PT.     OBJECTIVE      Medications    Current Facility-Administered Medications: labetalol (NORMODYNE;TRANDATE) injection 5 mg, 5 mg, Intravenous, Q4H PRN  phytonadione (VITAMIN K) tablet 2.5 mg, 2.5 mg, Oral, Once  warfarin (COUMADIN) tablet 5 mg, 5 mg, Oral, Every Other Day  warfarin (COUMADIN) tablet 4 mg, 4 mg, Oral, Every Other Day  morphine (PF) injection 2 mg, 2 mg, Intravenous, Q4H PRN  sodium chloride flush 0.9 % injection 10 mL, 10 mL, Intravenous, 2 times per day  sodium chloride flush 0.9 % injection 10 mL, 10 mL, Intravenous, PRN  sennosides-docusate sodium (SENOKOT-S) 8.6-50 MG tablet 1 tablet, 1 tablet, Oral, BID  magnesium hydroxide (MILK OF MAGNESIA) 400 MG/5ML suspension 30 mL, 30 mL, Oral, Daily PRN  perflutren lipid microspheres (DEFINITY) injection 1.65 mg, 1.5 mL, Intravenous, ONCE PRN  atorvastatin (LIPITOR) tablet 10 mg, 10 mg, Oral, Every Other Day  clonazePAM (KLONOPIN) tablet 0.5 mg, 0.5 mg, Oral, Daily  promethazine (PHENERGAN) tablet 12.5 mg, 12.5 mg, Oral, Q6H PRN **OR** ondansetron (ZOFRAN) injection 4 mg, 4 mg, Intravenous, Q6H PRN  polyethylene glycol (GLYCOLAX) packet 17 g, 17 g, Oral, Daily PRN  traMADol (ULTRAM) tablet 50 mg, 50 mg, Oral, Q6H PRN **OR** traMADol (ULTRAM) tablet 100 mg, 100 mg, Oral, Q6H PRN  acetaminophen (TYLENOL) tablet 500 mg, 500 mg, Oral, Q6H PRN  Physical    VITALS:  /78   Pulse 80   Temp 98.7 °F (37.1 °C) (Oral)   Resp 21   Ht 5' 6\" (1.676 m)   Wt 164 lb 3.9 oz (74.5 kg)   SpO2 90%   BMI 26.51 kg/m²   CONSTITUTIONAL:  awake, alert, cooperative, no apparent distress, and appears stated age  EYES:  Lids and lashes normal, pupils equal, round and reactive to light, extra ocular muscles intact, sclera clear, conjunctiva normal  ENT:  Normocephalic, without obvious abnormality, atraumatic, sinuses nontender on palpation, external ears without lesions, oral pharynx with moist mucus membranes, tonsils without erythema or exudates, gums normal and good dentition.   NECK:  Supple, symmetrical, trachea midline, no adenopathy, thyroid symmetric, not enlarged and no tenderness, skin normal  HEMATOLOGIC/LYMPHATICS:  no cervical lymphadenopathy  BACK:  Symmetric, no curvature, spinous processes are non-tender on palpation, paraspinous muscles are non-tender on palpation, no costal vertebral tenderness  LUNGS:  No increased work of breathing, good air exchange, clear to auscultation bilaterally, no crackles or wheezing  CARDIOVASCULAR:  Normal apical impulse, regular rate and rhythm, normal S1 and S2, no S3 or S4, and no murmur noted  ABDOMEN:  No scars, normal bowel sounds, soft, non-distended, non-tender, no masses palpated, no hepatosplenomegally  MUSCULOSKELETAL:  there is no redness, warmth, or swelling of the joints  NEUROLOGIC:  No focal neuro defict  SKIN:  no bruising or bleeding  Data    CBC:   Lab Results   Component Value Date    WBC 8.5 03/23/2021    RBC 3.04 03/23/2021    HGB 9.8 03/23/2021    HCT 30.7 03/23/2021    .0 03/23/2021    MCH 32.2 03/23/2021    MCHC 31.9 03/23/2021    RDW 14.7 03/23/2021     03/23/2021    MPV 10.9 03/23/2021     CMP:    Lab Results   Component Value Date     03/25/2021    K 3.3 03/25/2021    K 3.9 03/19/2021     03/25/2021    CO2 24 03/25/2021    BUN 31 03/25/2021    CREATININE 0.7 03/25/2021    GFRAA >60 03/25/2021    LABGLOM >60 03/25/2021    GLUCOSE 132 03/25/2021    GLUCOSE 115 03/05/2012    PROT 7.4 01/25/2018 LABALBU 3.8 08/08/2019    LABALBU 4.0 03/05/2012    CALCIUM 8.4 03/25/2021    BILITOT 1.1 08/08/2019    ALKPHOS 82 08/08/2019    AST 19 08/08/2019    ALT 9 08/08/2019       ASSESSMENT AND PLAN        1. Left displaced femoral neck fracture [facility]):  PT/OT  Continue anticoagulation  Discharge planning pending improved INR    2. Atrial Fibrillation: rate is well controlled  INR is supratherapeutic   Suspected that antibiotics contributed in INR elevation  Hold coumadin today    3. Supratherapeutic INR:   Ordered 1 time dose of 2.5 mg of Vit K today    4. Hypokalemia:   Replace with K-dur  BMP in am    5. Anemia: unsure of etiology  No overt sign of bleeding  CBCD in am  Will add CBC to today's lab if able to.     Rest of Chronic medical condition is stable

## 2021-03-25 NOTE — PROGRESS NOTES
OT BEDSIDE TREATMENT NOTE      Date:3/25/2021  Patient Name: Soila Lowery  MRN: [id number]  : 1930  Room: [address] [place] [address]     Referring Provider: Dileep Vega MD  Evaluating OT: Pollo Kaufman OTR/L [id number]     Placement Recommendation: Inpatient Rehab    Recommended Adaptive Equipment: TBD at rehab      [facility]   AM-PAC Daily Activity Inpatient   How much help for putting on and taking off regular lower body clothing?: Total  How much help for Bathing?: A Lot  How much help for Toileting?: Total  How much help for putting on and taking off regular upper body clothing?: A Lot  How much help for taking care of personal grooming?: A Lot  How much help for eating meals?: A Little  AM-PAC Inpatient Daily Activity Raw Score: 11  AM-PAC Inpatient ADL T-Scale Score : 29.04  ADL Inpatient CMS 0-100% Score: 70.42  ADL Inpatient CMS G-Code Modifier : CL     Based on patient's functional performance as stated below and their level of assistance needed prior to admission, this therapist believes that the patient would benefit from skilled Occupational Therapy following their hospital stay in an effort to increase safety and independence with completion of ADL/IADL tasks for functional independence and quality of life.      Diagnosis:   1. Left displaced femoral neck fracture ([facility]    2. Fall, initial encounter       Pertinent Medical History:   Past Medical History        Past Medical History:   Diagnosis Date    Atrial fibrillation ([facility]      Cancer ([facility]       prostate     Hyperlipidemia      Hypertension      Syncope and collapse           Precautions:  falls, full weight bearing: L LE, Hip Precautions, rash to R flank area    Pain Scale: Numeric Rate:unquantified pain in L hip; nsg aware         Social history: with family: spouse    Home architecture: single family home, 1 story, 3 steps to enter with rail, tub shower and walk in shower.    PLOF: independent with BADL and independent with IADL, pt ambulated with no of chux pad as sling for sit to stand;   cuing and assist for upright position Minimal Assist     Functional Mobility Maximal Assist x 2 with wheeled walker to improve balance to side step 1 foot towards head of bed, verbal cues for walker sequence and safety. Assistance for walker negotiation Max A x 2 for transfer from EOB to chair with use of FWW; pt demo'd heel toe shuffle with R LE with assist to advance LLE initially Modified [place]    Activity Tolerance fair  fair minus good        Comments: Patient cleared by nursing staff. Upon arrival pt supine in bed. Pt agreeable to OT tx session with partial cotreat with PT d/t level of physical assistance needed and pt's decreased endurance level to tolerate separate sessions. Pt educated with regards to bed mobility, hand placement, safety awareness, static sitting balance,  transfer training, functional mobility, standing balance, UE dressing, ECT's. At end of session pt seated in chair; nsg aware; All lines and tubes intact, call light within reach. Overall, pt demonstrated decreased independence and safety during completion of ADL/functional transfers/mobility tasks. Pt would benefit from continued skilled OT to increase safety and independence with completion of ADL/IADL tasks for functional independence and quality of life. Pt required cues and education as noted above for safe facilitation and completion of tasks. Therapist provided skilled monitoring of patient's response during treatment session. Prior to and at the end of session, environmental modifications/line management completed for patients safety and efficiency of treatment session. Overall, patient demonstrates moderate difficulties with completion of BADLs and IADLs. Factors contributing to these difficulties include  decreased endurance, and generalized weakness.  As noted above, patient likely to benefit from further OT intervention to increase independence, safety, and overall quality of life. Treatment:     ? Bed mobility: Facilitated bed mobility with cues for proper body mechanics and sequencing to prepare for ADL completion. ? Functional transfers: Facilitated transfers to/from EOB x 3 reps with cues for body alignment, safety and hand placement. ? ADL completion: Self-care retraining for the above-mentioned ADLs; training on proper hand placement, safety technique, sequencing, and energy conservation techniques. ? Postural Balance: Sitting/standing balance retraining to improve righting reactions with postural changes during ADLs.       · Pt has made fair minus progress towards set goals    ·   OT 1-3x/week for 5-7 days during hospitalization       Treatment Time In: 8:53 AM    Treatment Time Out: 9:19 AM       Treatment Charges: Mins Units   ADL/Home Mgt     02208     Thera Activities     48519 20 1   Ther Ex                 16931     Manual Therapy    34840     Neuro Re-ed         58128     Orthotic manage/training                               01198     Non Billable Time 6    Total Timed Treatment 26-6=20 5569 TIM Lopez/L #[id number]

## 2021-03-25 NOTE — PLAN OF CARE
Problem: Falls - Risk of:  Goal: Absence of physical injury  Description: Absence of physical injury  3/25/2021 0749 by David Smith RN  Outcome: Met This Shift     Problem: Pain:  Goal: Pain level will decrease  Description: Pain level will decrease  3/25/2021 0749 by David Smith RN  Outcome: Ongoing     Problem: Pain:  Goal: Control of acute pain  Description: Control of acute pain  3/25/2021 0749 by David Smith RN  Outcome: Ongoing     Problem: Pain:  Goal: Control of chronic pain  Description: Control of chronic pain  3/25/2021 0749 by David Smith RN  Outcome: Ongoing

## 2021-03-25 NOTE — PROGRESS NOTES
Patient complains of aching to left hip. Is refusing anything for pain at this time. Educated on pain control. Patient has a very poor appetite. Educated at bedside by myself and Dr. Jacques Moore. Patient will be given miralax for constipation today.  He has not had a BM since 3/20/21

## 2021-03-25 NOTE — CARE COORDINATION
SS NOTE: COVID NEGATIVE 3/22/21: [facility] has [place] and will be viable for 48 hours. For the SNF pt will need a PRECERT, signed JAMILA, current PT/OT and SW completed the HENs. Mary Busch. 3/25/2021.11:32AM.

## 2021-03-25 NOTE — PROGRESS NOTES
Physical Therapy    Physical Therapy Treatment Note    Room #:  9228/6773-56  Patient Name: Willam Xie  YOB: 1930  MRN: [id number]    Referring Provider:   Eddie Magaña MD     Date of Service: 3/25/2021    Evaluating Physical Therapist: Evelyn Roman, PT  #[id number]       Diagnosis:   Left displaced femoral neck fracture ([address] [S72.002A]   D/t fall  S.p left hemiarthroplasty    Patient Active Problem List   Diagnosis    A-fib ([address]    Chronic anticoagulation    Hypertension    H/O prostate cancer    Orthostatic hypotension    Sinus bradycardia-tachycardia syndrome ([address]    Pacemaker    Hyperlipidemia    Vitamin B12 deficiency    Vitamin D deficiency    Closed fracture of multiple pubic rami, right, initial encounter ([address]    COPD (chronic obstructive pulmonary disease) ([address]    Acute bronchitis    Acute respiratory failure with hypoxia ([address]    Closed fracture of ramus of right pubis (HCC)    Back pain    Closed compression fracture of L1 lumbar vertebra    Sacral fracture, closed ([address]    Left displaced femoral neck fracture ([address]        Tentative placement recommendation: Subacute rehab    Equipment recommendation: To be determined      Prior Level of Function: Patient ambulated independently    Rehab Potential: good    for baseline    Past medical history:   Past Medical History:   Diagnosis Date    Atrial fibrillation ([address]     Cancer ([address]     prostate     Hyperlipidemia     Hypertension     Syncope and collapse      Past Surgical History:   Procedure Laterality Date    CARDIOVERSION  2/22/2010    [facility]ful [facility].  CARDIOVERSION  8/11/2011    Success[facility] at [facility].     CATARACT REMOVAL WITH IMPLANT      COLONOSCOPY  09/19/2007    REDUNDANT COLON- [address]    DOPPLER ECHOCARDIOGRAPHY  08/29/2019    Dr Tom Glover abnormal left ventricular systolic function--mod severe tricuspid regurg w/mild pulmonary hyn--mild mitral, aortic, pulmonic regurg.  EYE SURGERY      CATARACT    HIP SURGERY Left 3/21/2021    LEFT HIP HEMIARTHROPLASTY [facility]) performed by Tabitha Broderick MD at [address] [address] Left 2-8-2013    Dual chamber-Dr. 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, alarm and pacemaker , weight bearing as tolerated Left lower extremity, hip precautions  SUBJECTIVE:    Social history: Patient lives with spouse   in a ranch home  with 3 steps  to enter with [employer] in shower grab bars    Equipment owned: [address]  [employer],       [address] [facility]   How much difficulty turning over in bed?: A Lot  How much difficulty sitting down on / standing up from a chair with arms?: A Lot  How much difficulty moving from lying on back to sitting on side of bed?: A Lot  How much help from another person moving to and from a bed to a chair?: A Lot  How much help from another person needed to walk in hospital room?: A Lot  How much help from another person for climbing 3-5 steps with a railing?: Total  AM-PAC Inpatient Mobility Raw Score : 11  AM-PAC Inpatient T-Scale Score : 33.86  Mobility Inpatient CMS 0-100% Score: 72.57  Mobility Inpatient CMS G-Code Modifier : CL    Nursing cleared patient for PT treatment. pt agreed to therapy     OBJECTIVE;   Initial Evaluation  Date: 3/21/2021 Treatment Date:  3/25/2021      Short Term/ Long Term   Goals   Was pt agreeable to Eval/treatment?  Yes   yes To be met in 3 days   Pain level   0/10    No number given    Bed Mobility    Rolling: Maximal assist of 1    Supine to sit: Not assessed     Sit to supine: Not assessed     Scooting: Not assessed    Rolling: Not assessed     Supine to sit: Not assessed     Sit to supine: Not assessed     Scooting: Not assessed     Rolling: Minimal assist of 1    Supine to sit: Minimal assist of 1    Sit to supine: Minimal assist of 1    Scooting: Minimal assist of 1     Transfers Sit to stand: Not assessed    Sit to stand: Not assessed      Sit to stand: Moderate assist of 1     Ambulation    not assessed    not assessed      50 feet using  wheeled walker with Minimal assist of 1    Stair negotiation: ascended and descended   Not assessed       3 steps with rail  Min a   ROM Within functional limits       Strength BUE:  2/5  RLE:  2/5  LLE:  2/5   Increase strength in affected mm groups by 1/3 grade   Balance Sitting EOB:  not assessed    Dynamic Standing:  not assessed   Sitting EOB: not assessed    Dynamic Standing: not assessed    Sitting EOB:  good    Dynamic Standing: fair       Patient is   & Oriented x person and place and follows directions   Sensation:  Patient  denies numbness and tingling     Edema:  yes left lower extremity    Endurance: poor         Patient education  Patient educated on role of Physical Therapy, risks of immobility, safety and plan of care,  importance of mobility while in hospital , hip precautions and weight bearing status     Patient was educated and facilitated on techniques to increase safety and independence with bed mobility, balance, functional transfers, and functional mobility. Patient was explained the the benefits of mobility and risks of immobility. Patient response to education:   Pt verbalized understanding Pt demonstrated skill Pt requires further education in this area   Yes Partial Yes      Treatment:  Patient practiced and was instructed/facilitated in the following treatment: Patient  performed supine exercises. Therapeutic Exercises:  ankle pumps, quad sets, glut sets, heel slide and hip abduction/adduction, x 10 reps. Verbal/tactile cues for technique. At end of session, patient in bed with alarm call light and phone within reach,   all lines and tubes intact, nursing notified.       Patient would benefit from continued skilled Physical unit(s)    Prasad Tse.  [facility]  LIC # [id number]

## 2021-03-25 NOTE — PROGRESS NOTES
Physical Therapy    Physical Therapy Treatment Note    Room #:  0384/1396-19  Patient Name: Rogelio Burgos  YOB: 1930  MRN: [id number]    Referring Provider:   Josefina Christine MD     Date of Service: 3/25/2021    Evaluating Physical Therapist: Hayley Valentin, PT  #[id number]       Diagnosis:   Left displaced femoral neck fracture ([address] [S72.002A]   D/t fall  S.p left hemiarthroplasty    Patient Active Problem List   Diagnosis    A-fib ([address]    Chronic anticoagulation    Hypertension    H/O prostate cancer    Orthostatic hypotension    Sinus bradycardia-tachycardia syndrome ([address]    Pacemaker    Hyperlipidemia    Vitamin B12 deficiency    Vitamin D deficiency    Closed fracture of multiple pubic rami, right, initial encounter ([address]    COPD (chronic obstructive pulmonary disease) ([address]    Acute bronchitis    Acute respiratory failure with hypoxia ([address]    Closed fracture of ramus of right pubis (HCC)    Back pain    Closed compression fracture of L1 lumbar vertebra    Sacral fracture, closed ([address]    Left displaced femoral neck fracture ([address]        Tentative placement recommendation: Subacute rehab    Equipment recommendation: To be determined      Prior Level of Function: Patient ambulated independently    Rehab Potential: good    for baseline    Past medical history:   Past Medical History:   Diagnosis Date    Atrial fibrillation ([address]     Cancer ([address]     prostate     Hyperlipidemia     Hypertension     Syncope and collapse      Past Surgical History:   Procedure Laterality Date    CARDIOVERSION  2/22/2010    Successful to NSR at [facility].  CARDIOVERSION  8/11/2011    [facility].     CATARACT REMOVAL WITH IMPLANT      COLONOSCOPY  09/19/2007    REDUNDANT COLON- [address]    DOPPLER ECHOCARDIOGRAPHY  08/29/2019    Dr Hernandez [place] abnormal left ventricular systolic function--mod severe tricuspid regurg w/mild pulmonary hyn--mild mitral, aortic, pulmonic regurg.  EYE SURGERY      CATARACT    HIP SURGERY Left 3/21/2021    LEFT HIP HEMIARTHROPLASTY [facility] performed by Juani Langford MD at [address] [address] Left 2-8-2013    Dual chamber-Dr. 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, alarm and pacemaker , weight bearing as tolerated Left lower extremity ,  SUBJECTIVE:    Social history: Patient lives with spouse   in a ranch home  with 3 steps  to enter with [employer] in shower grab bars    Equipment owned: Wilfrid Roche,       [address]   How much difficulty turning over in bed?: A Lot  How much difficulty sitting down on / standing up from a chair with arms?: A Lot  How much difficulty moving from lying on back to sitting on side of bed?: A Lot  How much help from another person moving to and from a bed to a chair?: A Lot  How much help from another person needed to walk in hospital room?: A Lot  How much help from another person for climbing 3-5 steps with a railing?: Total  AM-PAC Inpatient Mobility Raw Score : 11  AM-PAC Inpatient T-Scale Score : 33.86  Mobility Inpatient CMS 0-100% Score: 72.57  Mobility Inpatient CMS G-Code Modifier : CL    Nursing cleared patient for PT treatment. pt agreed to therapy     OBJECTIVE;   Initial Evaluation  Date: 3/21/2021 Treatment Date:  3/25/2021      Short Term/ Long Term   Goals   Was pt agreeable to Eval/treatment?  Yes   yes To be met in 3 days   Pain level   0/10    No number given    Bed Mobility    Rolling: Maximal assist of 1    Supine to sit: Not assessed     Sit to supine: Not assessed     Scooting: Not assessed    Rolling: Maximal assist of  2    Supine to sit: Maximal assist of  2    Sit to supine: Not assessed     Scooting: Maximal assist of  2    Rolling: Minimal assist of 1    Supine to sit: Minimal assist of 1    Sit to supine: Minimal assist of 1    Scooting: Minimal assist of 1     Transfers Sit to stand: Not assessed    Sit to stand: Moderate assist of  2 cues for hand placement      Sit to stand: Moderate assist of 1     Ambulation    not assessed    2 heel toe steps using  wheeled walker with Moderate assist of  2  V/C for sequencing, upright posture, and safety. 50 feet using  wheeled walker with Minimal assist of 1    Stair negotiation: ascended and descended   Not assessed       3 steps with rail  Min a   ROM Within functional limits       Strength BUE:  2/5  RLE:  2/5  LLE:  2/5   Increase strength in affected mm groups by 1/3 grade   Balance Sitting EOB:  not assessed    Dynamic Standing:  not assessed   Sitting EOB: good    Dynamic Standing: fair wheeled walker    Sitting EOB:  good    Dynamic Standing: fair       Patient is   & Oriented x person and place and follows directions   Sensation:  Patient  denies numbness and tingling     Edema:  yes left lower extremity    Endurance: poor         Patient education  Patient educated on role of Physical Therapy, risks of immobility, safety and plan of care,  importance of mobility while in hospital , hip precautions and weight bearing status     Patient was educated and facilitated on techniques to increase safety and independence with bed mobility, balance, functional transfers, and functional mobility. Patient was explained the the benefits of mobility and risks of immobility. Patient response to education:   Pt verbalized understanding Pt demonstrated skill Pt requires further education in this area   Yes Partial Yes      Treatment:  Patient practiced and was instructed/facilitated in the following treatment: Patient performed supine exercises. Pt transferred to edge of the bed. Pt  Sat edge of bed 10 minutes with Minimal assist of 1 to increase dynamic sitting balance and activity tolerance.  Pt stood, performed heel/toe shuffle on RLE and took steps with his LLE to the chair. Therapeutic Exercises:  ankle pumps, quad sets, glut sets, heel slide and hip abduction/adduction, x 10 reps. Verbal/tactile cues for technique. At end of session, patient in chair with left in care of [address] ([place]). call light and phone within reach,   all lines and tubes intact, nursing notified. Patient would benefit from continued skilled Physical Therapy to improve functional independence and quality of life. Patient's/ family goals   none stated        ASSESSMENT: Patient exhibits decreased strength, balance, endurance, range of motion and pain left lower extremity impairing functional mobility, transfers, gait , tolerance to activity and participation  Pt with PROM/AAROM with all exercises. Pt needing assist x 2 for all functional mobility. Pt having difficulty with weight shifting to advance right lower extremity so he performed a heel/toe shuffle to get to the chair.   Plan of Care:     -Bed Mobility: Lower extremity exercises , Upper extremity exercises  and Trunk control activities   -Sitting Balance: Incorporate reaching activities to activate trunk muscles , Hands on support to maintain midline  and Facilitate postural control in all planes   -Standing Balance: Perform strengthening exercises in standing to promote motor control with or without upper extremity support  and Instruct patient on adequate base of support to maintain balance  -Transfers: Provide instruction on proper hand and foot position for adequate transfer of weight onto lower extremities and use of gait device, Cues for hand placement, technique and safety, Facilitate weight shift forward on to lower extremities and provide necessary stabilization of bilateral lower extremities , Support transfer of weight on to lower extremities, Assist with extension of knees trunk and hip to accept weight transfer  and Provide stabilization to prevent fall   -Gait: Gait training, Standing activities to improve: base of support, weight shift, weight bearing , Exercises to improve trunk control and Exercises to improve hip and knee control   -Endurance: Utilize Supervised activities to increase level of endurance to allow for safe functional mobility including transfers and gait     Patient and or family understand(s) diagnosis, prognosis, and plan of care. Frequency of treatments: Patient will be seen  twice daily. Time in 8:40  Time out  9:05    Total Treatment Time 25 minutes       CPT codes:     Therapeutic activities (29027)   15 minutes  1 unit(s)  Therapeutic exercises (16907)   10 minutes  1 unit(s)    Meño Van  [facility]  LIC # [id number]

## 2021-03-25 NOTE — PROGRESS NOTES
OT BEDSIDE TREATMENT NOTE      Date:3/25/2021  Patient Name: Sharee Montoya  MRN: [id number]  : 1930  Room: [address] [place] [address]     Referring Provider: Dirk Suero MD  Evaluating OT: Cheyenne Cabrera OTR/L [id number]     Placement Recommendation: Inpatient Rehab    Recommended Adaptive Equipment: TBD at rehab      [facility]   AM-PAC Daily Activity Inpatient   How much help for putting on and taking off regular lower body clothing?: Total  How much help for Bathing?: A Lot  How much help for Toileting?: Total  How much help for putting on and taking off regular upper body clothing?: A Lot  How much help for taking care of personal grooming?: A Lot  How much help for eating meals?: A Little  AM-[facility] Inpatient Daily Activity Raw Score: 11  AM-PAC Inpatient ADL T-Scale Score : 29.04  ADL Inpatient CMS 0-100% Score: 70.42  ADL Inpatient CMS G-Code Modifier : CL     Based on patient's functional performance as stated below and their level of assistance needed prior to admission, this therapist believes that the patient would benefit from skilled Occupational Therapy following their hospital stay in an effort to increase safety and independence with completion of ADL/IADL tasks for functional independence and quality of life.      Diagnosis:   1. Left displaced femoral neck fracture ([address]    2. Fall, initial encounter       Pertinent Medical History:   Past Medical History        Past Medical History:   Diagnosis Date    Atrial fibrillation ([address]      Cancer ([address]       prostate     Hyperlipidemia      Hypertension      Syncope and collapse           Precautions:  falls, full weight bearing: L LE, Hip Precautions, rash to R flank area    Pain Scale: Numeric Rate:unquantified pain in L hip; nsg aware         Social history: with family: spouse    Home architecture: single family home, 1 story, 3 steps to enter with rail, tub shower and walk in shower.    PLOF: independent with BADL and independent with IADL, pt ambulated with no device   Equipment owned: wheeled walker, cane, shower chair, grab bars  Cognition: A&O x 3; follows 1 step directions. fair  Problem solving skills              fair  Memory               fair  Sequencing              fair  Judgement/safety  Communication: intact   Visual perceptual skills: intact                Glasses: yes   Edema: no     Sensation: intact   Hand Dominance:  Left     X Right       Left Right Comment   Passive range of motion [facility]      Active range of motion [facility]      Muscle Grade 4/5 4/5     /pinch Strength Intact  Intact         Functional Assessment:    Initial Evaluation Status Date:   3/23/2021 Treatment Status  Date: 3/25/2021 STGs = LTGs  Time frame: 5 - 14 days   Feeding Minimal Assist to bring cup to mouth to take sips of water at edge of bed N/T Independent    Grooming Maximal Assist   N/T Supervision    UB Dressing Maximal Assist to tie gown  Max A to adjust gown Supervision    LB Dressing Dependent  N/T  Modified [place]    Bathing Maximal Assist N/T Minimal Assist    Toileting Dependent for hygiene   N/T Minimal Assist    Bed Mobility  Facilitated Supine to sit: Maximal Assist x 2   Scooting:Maximal Assist x 2   Sit to supine: Maximal Assist x 2    Rolling: Maximal Assist x 2 time three reps to adjust linens, hygiene, and to assist nursing in measuring rash on R flank area. Max A x 2 for sit to supine; assist to guide UB and LE's to supine; all needs within reach including call light; positioning provided of LLE   Supine to sit: Supervision   Scooting:Supervision  Sit to supine: Supervision   Rolling: Supervision   Functional Transfers Maximal Assist x 2 from EOB to wheeled walker with use of chux as sling.   Transfer training with verbal cues for hand placement throughout session to improve safety.   Mod A x 2 /Max A x 2 for sit to stand transfers from chair with FWW; use of chux pad as sling for sit to stand;   cuing and assist for upright position Minimal Assist     Functional Mobility Maximal Assist x 2 with wheeled walker to improve balance to side step 1 foot towards head of bed, verbal cues for walker sequence and safety. Assistance for walker negotiation Max A x 2 for transfer from chair to EOB with use of FWW; pt demo'd heel toe shuffle with R LE and able to advance LLE with cuing for follow through Modified [place]    Activity Tolerance fair  fair minus good        Comments: Patient cleared by nursing staff. Upon arrival pt seated in chair and ready to RTB. Pt agreeable to OT tx session . Pt educated with regards to bed mobility, hand placement, safety awareness, static sitting balance,  transfer training, functional mobility, standing balance, UE dressing, ECT's, positioning. At end of session pt supine in bed in partial R sidelying position for skin integrity, as well as pillow for positioning between B LE's; nsg aware; All lines and tubes intact, call light within reach. Overall, pt demonstrated decreased independence and safety during completion of ADL/functional transfers/mobility tasks. Pt would benefit from continued skilled OT to increase safety and independence with completion of ADL/IADL tasks for functional independence and quality of life. Pt required cues and education as noted above for safe facilitation and completion of tasks. Therapist provided skilled monitoring of patient's response during treatment session. Prior to and at the end of session, environmental modifications/line management completed for patients safety and efficiency of treatment session. Overall, patient demonstrates moderate difficulties with completion of BADLs and IADLs. Factors contributing to these difficulties include  decreased endurance, and generalized weakness. As noted above, patient likely to benefit from further OT intervention to increase independence, safety, and overall quality of life.      Treatment:     ? Bed mobility: Facilitated bed mobility with cues for proper body mechanics and sequencing to prepare for ADL completion. ? Functional transfers: Facilitated transfers to/from EOB x 3 reps with cues for body alignment, safety and hand placement. ? Postural Balance: Sitting/standing balance retraining to improve righting reactions with postural changes during ADLs. ? Skilled positioning: Proper positioning to improve interaction with environment, overall functioning and decrease edema.     · Pt has made fair minus progress towards set goals    ·   OT 1-3x/week for 5-7 days during hospitalization       Treatment Time In: 9:55 AM    Treatment Time Out: 10:04 AM      Treatment Charges: Mins Units   ADL/Home Mgt     [id number]     Thera Activities     [id number] 1   Ther Ex                 [id number]     Manual Therapy    96938     Neuro Re-ed         [id number]     Orthotic manage/training                               54874     Non Billable Time     Total Timed Treatment 9 9995 TIM Lopez/LAZARO #[id number]

## 2021-03-26 VITALS
HEART RATE: 80 BPM | OXYGEN SATURATION: 95 % | WEIGHT: 161.6 LBS | DIASTOLIC BLOOD PRESSURE: 82 MMHG | BODY MASS INDEX: 25.97 KG/M2 | SYSTOLIC BLOOD PRESSURE: 128 MMHG | TEMPERATURE: 98.9 F | HEIGHT: 66 IN | RESPIRATION RATE: 21 BRPM

## 2021-03-26 LAB
ANION GAP SERPL CALCULATED.3IONS-SCNC: 12 MMOL/L (ref 7–16)
BUN BLDV-MCNC: 26 MG/DL (ref 8–23)
CALCIUM SERPL-MCNC: 8 MG/DL (ref 8.6–10.2)
CHLORIDE BLD-SCNC: 110 MMOL/L (ref 98–107)
CO2: 24 MMOL/L (ref 22–29)
CREAT SERPL-MCNC: 0.7 MG/DL (ref 0.7–1.2)
GFR AFRICAN AMERICAN: >60
GFR NON-AFRICAN AMERICAN: >60 ML/MIN/1.73
GLUCOSE BLD-MCNC: 105 MG/DL (ref 74–99)
HCT VFR BLD CALC: 29.2 % (ref 37–54)
HEMOGLOBIN: 9.2 G/DL (ref 12.5–16.5)
INR BLD: 2.3
MCH RBC QN AUTO: 31.4 PG (ref 26–35)
MCHC RBC AUTO-ENTMCNC: 31.5 % (ref 32–34.5)
MCV RBC AUTO: 99.7 FL (ref 80–99.9)
PDW BLD-RTO: 14.5 FL (ref 11.5–15)
PLATELET # BLD: 196 E9/L (ref 130–450)
PMV BLD AUTO: 10.7 FL (ref 7–12)
POTASSIUM SERPL-SCNC: 3.3 MMOL/L (ref 3.5–5)
PROTHROMBIN TIME: 27.2 SEC (ref 9.3–12.4)
RBC # BLD: 2.93 E12/L (ref 3.8–5.8)
SODIUM BLD-SCNC: 146 MMOL/L (ref 132–146)
WBC # BLD: 8.8 E9/L (ref 4.5–11.5)

## 2021-03-26 PROCEDURE — 85610 PROTHROMBIN TIME: CPT

## 2021-03-26 PROCEDURE — 97530 THERAPEUTIC ACTIVITIES: CPT

## 2021-03-26 PROCEDURE — 36415 COLL VENOUS BLD VENIPUNCTURE: CPT

## 2021-03-26 PROCEDURE — 99232 SBSQ HOSP IP/OBS MODERATE 35: CPT | Performed by: INTERNAL MEDICINE

## 2021-03-26 PROCEDURE — 97110 THERAPEUTIC EXERCISES: CPT

## 2021-03-26 PROCEDURE — 85027 COMPLETE CBC AUTOMATED: CPT

## 2021-03-26 PROCEDURE — 6370000000 HC RX 637 (ALT 250 FOR IP): Performed by: ORTHOPAEDIC SURGERY

## 2021-03-26 PROCEDURE — 80048 BASIC METABOLIC PNL TOTAL CA: CPT

## 2021-03-26 RX ORDER — POTASSIUM CHLORIDE 750 MG/1
10 TABLET, EXTENDED RELEASE ORAL 2 TIMES DAILY
Status: DISCONTINUED | OUTPATIENT
Start: 2021-03-26 | End: 2021-03-26 | Stop reason: HOSPADM

## 2021-03-26 RX ADMIN — DOCUSATE SODIUM 50 MG AND SENNOSIDES 8.6 MG 1 TABLET: 8.6; 5 TABLET, FILM COATED ORAL at 09:27

## 2021-03-26 NOTE — PROGRESS NOTES
Physical Therapy    Physical Therapy Treatment Note    Room #:  1068/6567-85  Patient Name: Marvin Richardson  YOB: 1930  MRN: [id number]    Referring Provider:   Morgan Diaz MD     Date of Service: 3/26/2021    Evaluating Physical Therapist: Savanah Mejias, PT  #[id number]       Diagnosis:   Left displaced femoral neck fracture ([address] [S72.002A]   D/t fall  S.p left hemiarthroplasty    Patient Active Problem List   Diagnosis    A-fib ([address]    Chronic anticoagulation    Hypertension    H/O prostate cancer    Orthostatic hypotension    Sinus bradycardia-tachycardia syndrome ([address]    Pacemaker    Hyperlipidemia    Vitamin B12 deficiency    Vitamin D deficiency    Closed fracture of multiple pubic rami, right, initial encounter ([address]    COPD (chronic obstructive pulmonary disease) ([address]    Acute bronchitis    Acute respiratory failure with hypoxia ([address]    Closed fracture of ramus of right pubis (HCC)    Back pain    Closed compression fracture of L1 lumbar vertebra    Sacral fracture, closed ([address]    Left displaced femoral neck fracture ([address]        Tentative placement recommendation: Subacute rehab    Equipment recommendation: To be determined      Prior Level of Function: Patient ambulated independently    Rehab Potential: good    for baseline    Past medical history:   Past Medical History:   Diagnosis Date    Atrial fibrillation ([address]     Cancer ([address]     prostate     Hyperlipidemia     Hypertension     Syncope and collapse      Past Surgical History:   Procedure Laterality Date    CARDIOVERSION  2/22/2010    Successful to [facility]R at [facility].  CARDIOVERSION  8/11/2011    Successful to [facility]R at [facility].     CATARACT REMOVAL WITH IMPLANT      COLONOSCOPY  09/19/2007    REDUNDANT COLON- [address]    DOPPLER ECHOCARDIOGRAPHY  08/29/2019    Dr Anmol Powell abnormal left ventricular systolic function--mod severe tricuspid regurg w/mild pulmonary hyn--mild mitral, aortic, pulmonic regurg.  EYE SURGERY      CATARACT    HIP SURGERY Left 3/21/2021    LEFT HIP HEMIARTHROPLASTY [facility] performed by Alphonse Capone MD at [address] [place] [address] Left 2-8-2013    Dual chamber-Dr. 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   How much difficulty turning over in bed?: A Lot  How much difficulty sitting down on / standing up from a chair with arms?: A Lot  How much difficulty moving from lying on back to sitting on side of bed?: A Lot  How much help from another person moving to and from a bed to a chair?: A Lot  How much help from another person needed to walk in hospital room?: A Lot  How much help from another person for climbing 3-5 steps with a railing?: Total  AM-PAC Inpatient Mobility Raw Score : 11  AM-PAC Inpatient T-Scale Score : 33.86  Mobility Inpatient CMS 0-100% Score: 72.57  Mobility Inpatient CMS G-Code Modifier : CL    Nursing cleared patient for PT treatment. pt agreed to therapy     OBJECTIVE;   Initial Evaluation  Date: 3/21/2021 Treatment Date:  3/26/2021      Short Term/ Long Term   Goals   Was pt agreeable to Eval/treatment?  Yes   yes To be met in 3 days   Pain level   0/10    No number given    Bed Mobility    Rolling: Maximal assist of 1    Supine to sit: Not assessed     Sit to supine: Not assessed     Scooting: Not assessed    Rolling: Maximal assist of  2    Supine to sit: Maximal assist of  2    Sit to supine: Not assessed     Scooting: Maximal assist of  2    Rolling: Minimal assist of 1    Supine to sit: Minimal assist of 1    Sit to supine: Minimal assist of 1    Scooting: Minimal assist of 1     Transfers Sit to stand: Not assessed    Sit to stand: Moderate assist of  2 cues for hand placement      Sit to stand: Moderate assist of 1     Ambulation    not assessed    2 heel toe steps using  wheeled walker with Moderate assist of  2  V/C for sequencing, upright posture, and safety. 50 feet using  wheeled walker with Minimal assist of 1    Stair negotiation: ascended and descended   Not assessed       3 steps with rail  Min a   ROM Within functional limits       Strength BUE:  2/5  RLE:  2/5  LLE:  2/5   Increase strength in affected mm groups by 1/3 grade   Balance Sitting EOB:  not assessed    Dynamic Standing:  not assessed   Sitting EOB: good    Dynamic Standing: fair wheeled walker    Sitting EOB:  good    Dynamic Standing: fair       Patient is   & Oriented x person and place and follows directions   Sensation:  Patient  denies numbness and tingling     Edema:  yes left lower extremity    Endurance: poor         Patient education  Patient educated on role of Physical Therapy, risks of immobility, safety and plan of care,  importance of mobility while in hospital , hip precautions and weight bearing status     Patient was educated and facilitated on techniques to increase safety and independence with bed mobility, balance, functional transfers, and functional mobility. Patient was explained the the benefits of mobility and risks of immobility. Patient response to education:   Pt verbalized understanding Pt demonstrated skill Pt requires further education in this area   Yes Partial Yes      Treatment:  Patient practiced and was instructed/facilitated in the following treatment: Patient performed supine exercises. Pt transferred to edge of the bed. Pt  Sat edge of bed 10 minutes with Minimal assist of 1 to increase dynamic sitting balance and activity tolerance.  Pt stood, performed heel/toe shuffle on RLE and needed assistance with advancing his LLE to the chair. Therapeutic Exercises:  ankle pumps, quad sets, glut sets, heel slide and hip abduction/adduction, x 10 reps. Verbal/tactile cues for technique. At end of session, patient in chair with left in care of nursing student. call light and phone within reach,   all lines and tubes intact, nursing notified. Patient would benefit from continued skilled Physical Therapy to improve functional independence and quality of life. Patient's/ family goals   none stated        ASSESSMENT: Patient exhibits decreased strength, balance, endurance, range of motion and pain left lower extremity impairing functional mobility, transfers, gait , tolerance to activity and participation  Pt with PROM/AAROM with all exercises. Pt needing assist x 2 for all functional mobility. Pt having difficulty with advancing left lower extremity so I assisted him advancing it. Pt able to take steps with his right lower extremity but he did not square up to the chair and sat abruptly but safely on the edge of the chair. Pt needed assistance with getting his hips to move further back in the chair.   Plan of Care:     -Bed Mobility: Lower extremity exercises , Upper extremity exercises  and Trunk control activities   -Sitting Balance: Incorporate reaching activities to activate trunk muscles , Hands on support to maintain midline  and Facilitate postural control in all planes   -Standing Balance: Perform strengthening exercises in standing to promote motor control with or without upper extremity support  and Instruct patient on adequate base of support to maintain balance  -Transfers: Provide instruction on proper hand and foot position for adequate transfer of weight onto lower extremities and use of gait device, Cues for hand placement, technique and safety, Facilitate weight shift forward on to lower extremities and provide necessary stabilization of bilateral lower extremities , Support transfer of weight on to lower extremities, Assist with extension of knees trunk and hip to accept weight transfer  and Provide stabilization to prevent fall   -Gait: Gait training, Standing activities to improve: base of support, weight shift, weight bearing , Exercises to improve trunk control and Exercises to improve hip and knee control   -Endurance: Utilize Supervised activities to increase level of endurance to allow for safe functional mobility including transfers and gait     Patient and or family understand(s) diagnosis, prognosis, and plan of care. Frequency of treatments: Patient will be seen  twice daily. Time in 8:20  Time out  8:44    Total Treatment Time 24 minutes       CPT codes:     Therapeutic activities (93066)   14 minutes  1 unit(s)  Therapeutic exercises (82105)   10 minutes  1 unit(s)    Meño Van  [facility]  LIC # [id number]

## 2021-03-26 NOTE — CARE COORDINATION
SS NOTE: COVID NEGATIVE 3/22/21: Arrangements completed for pt to be transferred to [facility] today at 12:30PM via [facility]. SW checked pt's room and dtr and wife not here yet SW left a detailed vm for dtr Kristine Downey and attempted contact with pt's wife- however she did not answer. For the [facility] SN[facility] placement pt will need a PRECERT (which is only viable through today), a signed JAMILA, current PT/OT and SW completed the HENs. Norman Busch. 3/25/2021.11:32AM. negative...

## 2021-03-26 NOTE — DISCHARGE INSTR - PHARMACY
tramadol  Pronunciation:  TRAM a dol  Brand:  ConZip, Qdolo, Ultram, Ultram ER  What is the most important information I should know about tramadol? MISUSE OF THIS MEDICINE CAN CAUSE ADDICTION, OVERDOSE, OR DEATH. Keep the medication in a place where others cannot get to it. Tramadol should not be given to a child younger than 15years old, or anyone younger than 25years old who recently had surgery to remove the tonsils or adenoids. Ultram ER should not be given to anyone younger than 25years old. Taking tramadol during pregnancy may cause life-threatening withdrawal symptoms in the . Fatal side effects can occur if you use tramadol with alcohol, or with other drugs that cause drowsiness or slow your breathing. What is tramadol? Tramadol is an pain medicine similar to an opioid. Tramadol is used to treat moderate to severe pain. The extended-release form of tramadol is for around-the-clock treatment of pain. This form of tramadol is not  for use on an as-needed basis for pain. Tramadol may also be used for purposes not listed in this medication guide. What should I discuss with my healthcare provider before taking tramadol? You should not take tramadol if you are allergic to it, or if you have:  · severe asthma or breathing problems;  · a stomach or bowel obstruction (including paralytic ileus);  · if you have recently used alcohol, sedatives, tranquilizers, or narcotic medications; or  · if you have used an MAO inhibitor in the past 14 days (such as isocarboxazid, linezolid, methylene blue injection, phenelzine, rasagiline, selegiline, or tranylcypromine). Tramadol should not be given to a child younger than 15years old. Ultram ER should not be given to anyone younger than 25years old. Do not give tramadol to anyone younger than 25years old who recently had surgery to remove the tonsils or adenoids. Seizures have occurred in some people taking tramadol.  Your seizure risk may be higher if you have ever had:  · a head injury, epilepsy or other seizure disorder;  · drug or alcohol addiction; or  · a metabolic disorder. Tell your doctor if you have ever had:  · breathing problems, sleep apnea;  · liver or kidney disease;  · urination problems;  · problems with your gallbladder, pancreas, or thyroid;  · a stomach disorder; or  · mental illness, or suicide attempt. If you use tramadol while you are pregnant, your baby could become dependent on the drug. This can cause life-threatening withdrawal symptoms in the baby after it is born. Babies born dependent on habit-forming medicine may need medical treatment for several weeks. Do not breastfeed. Tramadol can pass into breast milk and cause breathing problems or death in a nursing baby. How should I take tramadol? Follow the directions on your prescription label and read all medication guides. Never use tramadol in larger amounts, or for longer than prescribed. Tell your doctor if you feel an increased urge to take more of this medicine. Never share opioid medicine with another person, especially someone with a history of drug abuse or addiction. MISUSE CAN CAUSE ADDICTION, OVERDOSE, OR DEATH. Keep the medicine in a place where others cannot get to it. Selling or giving away opioid medicine is against the law. Stop taking all other opioid medications when you start taking tramadol. Tramadol can be taken with or without food, but take it the same way each time. Swallow the capsule or tablet whole to avoid exposure to a potentially fatal overdose. Do not crush, chew, break, open, or dissolve. Measure liquid medicine carefully. Use the dosing syringe provided, or use a medicine dose-measuring device (not a kitchen spoon). Never crush or break a tramadol pill to inhale the powder or mix it into a liquid to inject the drug into your vein. This practice has resulted in death.   Do not stop using tramadol suddenly, or you could have unpleasant emergency medical help if you have signs of an allergic reaction (hives, difficult breathing, swelling in your face or throat) or a severe skin reaction (fever, sore throat, burning in your eyes, skin pain, red or purple skin rash that spreads and causes blistering and peeling). Tramadol can slow or stop your breathing, and death may occur. A person caring for you should give naloxone and/or seek emergency medical attention if you have slow breathing with long pauses, blue colored lips, or if you are hard to wake up. Call your doctor at once if you have:  · noisy breathing, sighing, shallow breathing, breathing that stops during sleep;  · a slow heart rate or weak pulse;  · a light-headed feeling, like you might pass out;  · seizure (convulsions); or  · low cortisol levels --nausea, vomiting, loss of appetite, dizziness, worsening tiredness or weakness. Seek medical attention right away if you have symptoms of serotonin syndrome, such as: agitation, hallucinations, fever, sweating, shivering, fast heart rate, muscle stiffness, twitching, loss of coordination, nausea, vomiting, or diarrhea. Serious side effects may be more likely in older adults and those who are overweight, malnourished, or debilitated. Long-term use of opioid medication may affect fertility (ability to have children) in men or women. Common side effects may include:  · constipation, nausea, vomiting, stomach pain;  · dizziness, drowsiness, tiredness;  · headache; or  · itching. This is not a complete list of side effects and others may occur. Call your doctor for medical advice about side effects. You may report side effects to FDA at 5-341-FDA-0834. What other drugs will affect tramadol? You may have breathing problems or withdrawal symptoms if you start or stop taking certain other medicines.  Tell your doctor if you also use an antibiotic, antifungal medication, heart or blood pressure medication, seizure medication, or medicine to treat HIV or hepatitis C. Opioid medication can interact with many other drugs and cause dangerous side effects or death. Be sure your doctor knows if you also use:  · cold or allergy medicines, bronchodilator asthma/COPD medication, or a diuretic (\"water pill\");  · medicines for motion sickness, irritable bowel syndrome, or overactive bladder;  · other narcotic medications --opioid pain medicine or prescription cough medicine;  · a sedative like Valium --diazepam, alprazolam, lorazepam, Xanax, Klonopin, Versed, and others;  · drugs that make you sleepy or slow your breathing --a sleeping pill, muscle relaxer, medicine to treat mood disorders or mental illness; or  · drugs that affect serotonin levels in your body --a stimulant, or medicine for depression, Parkinson's disease, migraine headaches, serious infections, or nausea and vomiting. This list is not complete and many other drugs may affect tramadol. This includes prescription and over-the-counter medicines, vitamins, and herbal products. Not all possible drug interactions are listed here. Where can I get more information? Your doctor or pharmacist can provide more information about tramadol. Remember, keep this and all other medicines out of the reach of children, never share your medicines with others, and use this medication only for the indication prescribed. Every effort has been made to ensure that the information provided by [address] is accurate, up-to-date, and complete, but no guarantee is made to that effect. Drug information contained herein may be time sensitive. [facility] information has been compiled for use by healthcare practitioners and consumers in the [place] and therefore [facility] does not warrant that uses outside of the [place] are appropriate, unless specifically indicated otherwise. [facility] drug information does not endorse drugs, diagnose patients or recommend therapy.  [facility] drug information is an informational resource designed to assist licensed healthcare practitioners in caring for their patients and/or to serve consumers viewing this service as a supplement to, and not a substitute for, the expertise, skill, knowledge and judgment of healthcare practitioners. The absence of a warning for a given drug or drug combination in no way should be construed to indicate that the drug or drug combination is safe, effective or appropriate for any given patient. [facility] does not assume any responsibility for any aspect of healthcare administered with the aid of information [facility] provides. The information contained herein is not intended to cover all possible uses, directions, precautions, warnings, drug interactions, allergic reactions, or adverse effects. If you have questions about the drugs you are taking, check with your doctor, nurse or pharmacist.  Copyright 5616-1354 [address] Version: 21.01. Revision date: 12/11/2020. Care instructions adapted under license by [facility] ([facility]). If you have questions about a medical condition or this instruction, always ask your healthcare professional. [place] [address] any warranty or liability for your use of this information. warfarin (oral)  Pronunciation:  WAR far in  Brand:  Coumadin, Ciaran Zuluaga  What is the most important information I should know about warfarin? You should not take warfarin if you are prone to bleeding because of a medical condition, if you have an upcoming surgery, or if you need a spinal tap or epidural. Do not take warfarin if you cannot take it on time every day. Warfarin increases your risk of severe or fatal bleeding, especially if you have certain medical conditions, if you are 72 or older, or if you have had a stroke, or bleeding in your stomach or intestines. Seek emergency help if you have any bleeding that will not stop.    Call your doctor at once if you have other signs of bleeding such as: swelling, pain, feeling very weak or dizzy, unusual bruising, bleeding gums, nosebleeds, heavy menstrual periods or abnormal vaginal bleeding, blood in your urine, bloody or tarry stools, coughing up blood or vomit that looks like coffee grounds. Many other drugs can increase your risk of bleeding when used with warfarin. Tell your doctor about all medicines you have recently used. Avoid making any changes in your diet without first talking to your doctor. Some foods can make warfarin less effective. What is warfarin? Warfarin is an anticoagulant (blood thinner). Warfarin reduces the formation of blood clots. Warfarin is used to treat or prevent blood clots in veins or arteries, which can reduce the risk of stroke, heart attack, or other serious conditions. Warfarin may also be used for purposes not listed in this medication guide. What should I discuss with my healthcare provider before taking warfarin? You should not take warfarin if you are allergic to it, or if:  · you have very high blood pressure;  · you recently had or will have surgery on your brain, spine, or eye;  · you undergo a spinal tap or spinal anesthesia (epidural); or  · you cannot take warfarin on time every day. You also should not take warfarin if you are are prone to bleeding because of a medical condition, such as:   · a blood cell disorder (such as low red blood cells or low platelets);  · ulcers or bleeding in your stomach, intestines, lungs, or urinary tract;  · an aneurysm or bleeding in the brain; or  · an infection of the lining of your heart. Do not take warfarin if you are pregnant, unless your doctor tells you to. Warfarin can cause birth defects, but preventing blood clots may outweigh any risks to the baby. If you are not pregnant, use effective birth control to prevent pregnancy while taking warfarin and for at least 1 month after your last dose. Tell your doctor right away if you become pregnant.   Warfarin can make you bleed more easily, especially if you have ever had:   · high blood pressure or serious heart disease;  · kidney disease;  · cancer or low blood cell counts;  · an accident or surgery;  · bleeding in your stomach or intestines;  · a stroke; or  · if you are 72 or older. To make sure warfarin is safe for you, tell your doctor if you have ever had:  · diabetes;  · congestive heart failure;  · liver disease, kidney disease (or if you are on dialysis);  · a hereditary clotting deficiency; or  · low blood platelets after receiving heparin. It is not known whether warfarin passes into breast milk. Watch for signs of bruising or bleeding in the baby if you take warfarin while you are breast-feeding a baby. How should I take warfarin? Follow all directions on your prescription label. Your doctor may occasionally change your dose. Do not take warfarin in larger or smaller amounts or for longer than your doctor tells you to. Take warfarin at the same time every day, with or without food. Never take a double dose. Warfarin can make it easier for you to bleed. Seek emergency help if you have any bleeding that will not stop. You will need frequent \"INR\" or prothrombin time tests (to measure your blood-clotting time and determine your warfarin dose). You must remain under the care of a doctor while taking warfarin. If you receive warfarin in a hospital, call or visit your doctor 3 to 7 days after you leave the hospital. Your INR will need to be tested at that time. Do not miss any follow-up appointments. Tell your doctor if you are sick with diarrhea, fever, chills, or flu symptoms, or if your body weight changes. You may need to stop taking warfarin 5 to 7 days before having any surgery, dental work, or a medical procedure. Call your doctor for instructions. Wear a medical alert tag or carry an ID card stating that you take warfarin. Any medical care provider who treats you should know that you are taking this medicine.   Store nosebleeds;  · bleeding from wounds or needle injections that will not stop;  · heavy menstrual periods or abnormal vaginal bleeding;  · blood in your urine, bloody or tarry stools; or  · coughing up blood or vomit that looks like coffee grounds. Clots formed by warfarin may block normal blood flow, which could lead to tissue death or amputation of the affected body part. Get medical help at once if you have:  · pain, swelling, hot or cold feeling, skin changes, or discoloration anywhere on your body; or  · sudden and severe leg or foot pain, foot ulcer, purple toes or fingers. Bleeding is the most common side effect of warfarin. This is not a complete list of side effects and others may occur. Call your doctor for medical advice about side effects. You may report side effects to FDA at 7-263-FDA-5075. What other drugs will affect warfarin? Many drugs (including some over-the-counter medicines and herbal products) can affect your INR and may increase the risk of bleeding if you take them with warfarin. Not all possible drug interactions are listed in this medication guide. It is very important to ask your doctor and pharmacist before you start or stop using any other medicine, especially:  · other medicines to prevent blood clots;  · an antibiotic or antifungal medicine;  · supplements that contain vitamin K; or  · herbal (botanical) products --coenzyme Q10, cranberry, echinacea, garlic, ginkgo biloba, ginseng, goldenseal, or Gloria's wort. This list is not complete and many other drugs can interact with warfarin. This includes prescription and over-the-counter medicines, vitamins, and herbal products. Give a list of all your medicines to any healthcare provider who treats you. Where can I get more information? Your pharmacist can provide more information about warfarin.   Remember, keep this and all other medicines out of the reach of children, never share your medicines with others, and use this medication only for the indication prescribed. Every effort has been made to ensure that the information provided by [address] [place], [place] [address]can Dr is accurate, up-to-date, and complete, but no guarantee is made to that effect. Drug information contained herein may be time sensitive. [facility] information has been compiled for use by healthcare practitioners and consumers in the [place] and therefore [facility] does not warrant that uses outside of the [place] are appropriate, unless specifically indicated otherwise. [facility]'s drug information does not endorse drugs, diagnose patients or recommend therapy. [facility] drug information is an informational resource designed to assist licensed healthcare practitioners in caring for their patients and/or to serve consumers viewing this service as a supplement to, and not a substitute for, the expertise, skill, knowledge and judgment of healthcare practitioners. The absence of a warning for a given drug or drug combination in no way should be construed to indicate that the drug or drug combination is safe, effective or appropriate for any given patient. [facility] does not assume any responsibility for any aspect of healthcare administered with the aid of information [facility] provides. The information contained herein is not intended to cover all possible uses, directions, precautions, warnings, drug interactions, allergic reactions, or adverse effects. If you have questions about the drugs you are taking, check with your doctor, nurse or pharmacist.  Copyright 7964-0085 [address]: 22.01. Revision date: 9/14/2017. Care instructions adapted under license by [facility] ([facility]). If you have questions about a medical condition or this instruction, always ask your healthcare professional. [place] [address] any warranty or liability for your use of this information.

## 2021-03-26 NOTE — PROGRESS NOTES
Comprehensive Nutrition Assessment    Type and Reason for Visit:  Initial, RD Nutrition Re-Screen/LOS    Nutrition Recommendations/Plan: Continue with diet and modify ONS BID Ensure Enlive    Nutrition Assessment:  Pt admits s/p Fall w/ (L) Displaced femoral neck Fx w/ PMH of Prostate Ca, COPD, HLD, HTN, COPD. Pt at nutritional compromise 2/2 poor-fair p.o. intakes. Will modify ONS BID    Malnutrition Assessment:  Malnutrition Status: At risk for malnutrition (Comment)    Context:  Acute Illness     Findings of the 6 clinical characteristics of malnutrition:  Energy Intake:  7 - 50% or less of estimated energy requirements for 5 or more days  Weight Loss:  No significant weight loss     Body Fat Loss:  No significant body fat loss     Muscle Mass Loss:  No significant muscle mass loss    Fluid Accumulation:  No significant fluid accumulation     Strength:  Not Performed       Nutrition Related Findings:  A/ox3, missing teeth,Abd WDL, Bowel sounds: RLQ/RUQ hypoactive, LUQ/LLQ +, LLE +1 edema , RLE trace edema, -I/O -3.98L      Wounds:  Surgical Incision       Current Nutrition Therapies:    DIET GENERAL; Dysphagia Soft and Bite-Sized  Dietary Nutrition Supplements: Standard High Calorie Oral Supplement    Anthropometric Measures:  · Height: 5' 6\" (167.6 cm)  · Current Body Weight: 161 lb (73 kg)(3/26)   · Admission Body Weight: 152 lb (68.9 kg)(3/21  bed scale)    · Usual Body Weight: 152 lb (68.9 kg)(1/6/21 bed scale per emr)     · Ideal Body Weight: 142 lbs; % Ideal Body Weight 113.4 %   · BMI: 26  · Adjusted Body Weight:  ; No Adjustment   · BMI Categories: Overweight (BMI 25.0-29. 9)       Nutrition Diagnosis:   · Inadequate oral intake related to early satiety as evidenced by intake 26-50%      Nutrition Interventions:   Food and/or Nutrient Delivery:  Continue Current Diet, Modify Oral Nutrition Supplement  Nutrition Education/Counseling:  No recommendation at this time   Coordination of Nutrition Care: Continue to monitor while inpatient    Goals:  Pt to consume >50-75% most meals/ONS       Nutrition Monitoring and Evaluation:   Behavioral-Environmental Outcomes:  None Identified   Food/Nutrient Intake Outcomes:  Food and Nutrient Intake, Supplement Intake  Physical Signs/Symptoms Outcomes:  Biochemical Data, Chewing or Swallowing, Fluid Status or Edema, Nutrition Focused Physical Findings, Skin, Weight     Discharge Planning:    Continue current diet, Continue Oral Nutrition Supplement     Electronically signed by Jameson Patel RD, LD on 3/26/21 at 10:13 AM EDT    Contact: [phone]

## 2021-03-26 NOTE — PROGRESS NOTES
Charge RN notified Dr. Ca Kwok about INR of 2.3. Dr. Ca Kwok is ok with d/c if patient is accepted into a facility.

## 2021-03-26 NOTE — PROGRESS NOTES
Report called to [facility]. All belongings sent with patient. Family at bedside. [facility] ambulance transferred patient via stretcher.

## 2021-03-26 NOTE — PROGRESS NOTES
Department of Internal Medicine  General Internal Medicine  Attending Progress Note  Chief Complaint   Patient presents with    Fall     fell backwards off step stool, -loc +thinners, hit head on cabinets, pain to left hip     SUBJECTIVE:    I evaluated patient's rash with his nurse. Unsure of the etiology of rash, but since it started after zosyn and Doxy were given these med could be culprit. meds have since been discontinued. Or may be related to hospital bed. Plan is still to discharge patient today. Will continue to monitor the rash at SNF.      OBJECTIVE      Medications    Current Facility-Administered Medications: labetalol (NORMODYNE;TRANDATE) injection 5 mg, 5 mg, Intravenous, Q4H PRN  warfarin (COUMADIN) tablet 5 mg, 5 mg, Oral, Every Other Day  warfarin (COUMADIN) tablet 4 mg, 4 mg, Oral, Every Other Day  morphine (PF) injection 2 mg, 2 mg, Intravenous, Q4H PRN  sodium chloride flush 0.9 % injection 10 mL, 10 mL, Intravenous, 2 times per day  sodium chloride flush 0.9 % injection 10 mL, 10 mL, Intravenous, PRN  sennosides-docusate sodium (SENOKOT-S) 8.6-50 MG tablet 1 tablet, 1 tablet, Oral, BID  magnesium hydroxide (MILK OF MAGNESIA) 400 MG/5ML suspension 30 mL, 30 mL, Oral, Daily PRN  perflutren lipid microspheres (DEFINITY) injection 1.65 mg, 1.5 mL, Intravenous, ONCE PRN  atorvastatin (LIPITOR) tablet 10 mg, 10 mg, Oral, Every Other Day  clonazePAM (KLONOPIN) tablet 0.5 mg, 0.5 mg, Oral, Daily  promethazine (PHENERGAN) tablet 12.5 mg, 12.5 mg, Oral, Q6H PRN **OR** ondansetron (ZOFRAN) injection 4 mg, 4 mg, Intravenous, Q6H PRN  polyethylene glycol (GLYCOLAX) packet 17 g, 17 g, Oral, Daily PRN  traMADol (ULTRAM) tablet 50 mg, 50 mg, Oral, Q6H PRN **OR** traMADol (ULTRAM) tablet 100 mg, 100 mg, Oral, Q6H PRN  acetaminophen (TYLENOL) tablet 500 mg, 500 mg, Oral, Q6H PRN  Physical    VITALS:  /82   Pulse 80   Temp 98.9 °F (37.2 °C) (Oral)   Resp 21   Ht 5' 6\" (1.676 m)   Wt 161 lb 9.6 oz (73.3 kg)   SpO2 95%   BMI 26.08 kg/m²   CONSTITUTIONAL:  awake, alert, cooperative, no apparent distress, and appears stated age  EYES:  Lids and lashes normal, pupils equal, round and reactive to light, extra ocular muscles intact, sclera clear, conjunctiva normal  ENT:  Normocephalic, without obvious abnormality, atraumatic, sinuses nontender on palpation, external ears without lesions, oral pharynx with moist mucus membranes, tonsils without erythema or exudates, gums normal and good dentition. NECK:  Supple, symmetrical, trachea midline, no adenopathy, thyroid symmetric, not enlarged and no tenderness, skin normal  HEMATOLOGIC/LYMPHATICS:  no cervical lymphadenopathy  BACK:  Positive rash on patient's back  LUNGS:  No increased work of breathing, good air exchange, clear to auscultation bilaterally, no crackles or wheezing  CARDIOVASCULAR:  Normal apical impulse, regular rate and rhythm, normal S1 and S2, no S3 or S4, and no murmur noted  ABDOMEN:  No scars, normal bowel sounds, soft, non-distended, non-tender, no masses palpated, no hepatosplenomegally  MUSCULOSKELETAL:  there is no redness, warmth, or swelling of the joints  NEUROLOGIC:  Awake, alert, oriented to name, place and time.    SKIN:  no bruising or bleeding  Data    CBC:   Lab Results   Component Value Date    WBC 8.8 03/26/2021    RBC 2.93 03/26/2021    HGB 9.2 03/26/2021    HCT 29.2 03/26/2021    MCV 99.7 03/26/2021    MCH 31.4 03/26/2021    MCHC 31.5 03/26/2021    RDW 14.5 03/26/2021     03/26/2021    MPV 10.7 03/26/2021     CMP:    Lab Results   Component Value Date     03/26/2021    K 3.3 03/26/2021    K 3.9 03/19/2021     03/26/2021    CO2 24 03/26/2021    BUN 26 03/26/2021    CREATININE 0.7 03/26/2021    GFRAA >60 03/26/2021    LABGLOM >60 03/26/2021    GLUCOSE 105 03/26/2021    GLUCOSE 115 03/05/2012    PROT 7.4 01/25/2018    LABALBU 3.8 08/08/2019    LABALBU 4.0 03/05/2012    CALCIUM 8.0 03/26/2021    BILITOT 1.1 08/08/2019 ALKPHOS 82 08/08/2019    AST 19 08/08/2019    ALT 9 08/08/2019       ASSESSMENT AND PLAN        1. Left displaced femoral neck fracture (HCC)  S/p replaced  PT/OT  Continue to monitor    2. Atrial Fibrillation: rate is well controlled  Continue to monitor  On coumadin. INR is therapeutic now after Vit K is given  Continue to monitor    3. Anemia: stable  Continue to monitor  No sign of bleeding    4. Hypokalemia: will replace    5. Rash: will monitor  Unknown etiology, patient is not complaining of itching   Will not use benadryl since patient is still experiencing post anesthesia effect.

## 2021-03-26 NOTE — DISCHARGE INSTR - DIET
 Good nutrition is important when healing from an illness, injury, or surgery. Follow any nutrition recommendations given to you during your hospital stay.  If you were given an oral nutrition supplement while in the hospital, continue to take this supplement at home. You can take it with meals, in-between meals, and/or before bedtime. These supplements can be purchased at most local grocery stores, pharmacies, and chain super-stores.  If you have any questions about your diet or nutrition, call the hospital and ask for the dietitian. Ensure three times a day      Learning About the Diet for Swallowing Problems  What are swallowing problems? Difficulty swallowing is also called dysphagia (say \"fqw-HYN-kvw-uh\"). It is most often a sign of a problem with your throat or esophagus. This is the tube that moves food and liquids from the back of your mouth to your stomach. Trouble swallowing can occur when the muscles and nerves that move food through the throat and esophagus are not working right. To help you swallow food, your doctor or speech therapist may advise a special dysphagia diet for you. Why is a special diet important? A dysphagia diet can help you handle some problems that can occur when it's hard to swallow food and liquids easily. These problems can include:  · Malnutrition. This means you aren't getting enough healthy foods to keep your body working well. · Dehydration. This means you aren't getting enough liquids to keep your body healthy. · Aspiration. This means that food, liquid, or saliva goes down your windpipe (trachea) into your lungs, instead of down your esophagus to your stomach. This can lead to aspiration pneumonia, which is an inflammation of the lungs. What is the dysphagia diet? In the dysphagia diet, you change the foods you eat and the liquids you drink to make it easier to swallow them. You may:  · Change the texture of the foods you eat.  Your doctor or speech therapist may advise you to eat one of these types of foods:  ? Easy-to-chew foods. These are foods that are soft or tender. ? Soft and bite-sized foods. These are soft foods that have been cut into small pieces. ? Minced and moist foods. These are very soft, small, and moist lumps of food that need very little chewing. ? Pureed foods. These are foods that have been blended smooth. The puree must be thick enough to hold its shape on a spoon. These foods don't need to be chewed. ? Liquidized foods. These are foods that have been blended smooth but are not as thick as pureed foods. You can drink them from a cup. · Thicken the liquids you drink. Your doctor or speech therapist will tell you what kind of thickener to use and how thick to make the liquids. ? Slightly thick liquids. These are thicker than water but can flow through a straw. ? Mildly thick liquids. These can be sipped from a cup but take some effort to drink with a straw. ? Moderately thick liquids. These liquids are thick enough to drink from a cup or from a spoon. But they are hard to drink through a wide straw. ? Extremely thick liquids. These are thick enough to hold their shape on a spoon. They are too thick to drink from a cup or suck through a straw. Your speech therapist will help you learn exercises to train your muscles to work together so you can swallow. You may also need to learn how to position your body or how to put food in your mouth to be able to swallow better. Follow-up care is a key part of your treatment and safety. Be sure to make and go to all appointments, and call your doctor if you are having problems. It's also a good idea to know your test results and keep a list of the medicines you take. Where can you learn more? Go to https://chsenthil.[employer]. org and sign in to your [email or web address] account. Enter R243 in the ListRunner box to learn more about \"Learning About the Diet for Swallowing Problems. \" If you do not have an account, please click on the \"Sign Up Now\" link. Current as of: February 26, 2020               Content Version: 12.8  © 2006-2021 Healthwise, Incorporated. Care instructions adapted under license by [facility] ([facility]). If you have questions about a medical condition or this instruction, always ask your healthcare professional. [address] any warranty or liability for your use of this information.

## 2021-03-26 NOTE — PROGRESS NOTES
Department of Orthopedic Surgery  Attending Progress Note      SUBJECTIVE  Patient comfortable in bed.     OBJECTIVE    Physical    VITALS:  BP (!) 152/71   Pulse 84   Temp 99.3 °F (37.4 °C) (Oral)   Resp 20   Ht 5' 6\" (1.676 m)   Wt 161 lb 9.6 oz (73.3 kg)   SpO2 90%   BMI 26.08 kg/m²   NVI,  Calves soft and nontender    Data    CBC:   Lab Results   Component Value Date    WBC 8.5 03/23/2021    RBC 3.04 03/23/2021    HGB 9.8 03/23/2021    HCT 30.7 03/23/2021    .0 03/23/2021    MCH 32.2 03/23/2021    MCHC 31.9 03/23/2021    RDW 14.7 03/23/2021     03/23/2021    MPV 10.9 03/23/2021     BMP:    Lab Results   Component Value Date     03/25/2021    K 3.3 03/25/2021    K 3.9 03/19/2021     03/25/2021    CO2 24 03/25/2021    BUN 31 03/25/2021    LABALBU 3.8 08/08/2019    LABALBU 4.0 03/05/2012    CREATININE 0.7 03/25/2021    CALCIUM 8.4 03/25/2021    GFRAA >60 03/25/2021    LABGLOM >60 03/25/2021    GLUCOSE 132 03/25/2021    GLUCOSE 115 03/05/2012     PT/INR:    Lab Results   Component Value Date    PROTIME 59.2 03/25/2021    PROTIME 30.2 03/05/2021    INR 5.0 03/25/2021     Current Inpatient Medications    Current Facility-Administered Medications: labetalol (NORMODYNE;TRANDATE) injection 5 mg, 5 mg, Intravenous, Q4H PRN  warfarin (COUMADIN) tablet 5 mg, 5 mg, Oral, Every Other Day  warfarin (COUMADIN) tablet 4 mg, 4 mg, Oral, Every Other Day  morphine (PF) injection 2 mg, 2 mg, Intravenous, Q4H PRN  sodium chloride flush 0.9 % injection 10 mL, 10 mL, Intravenous, 2 times per day  sodium chloride flush 0.9 % injection 10 mL, 10 mL, Intravenous, PRN  sennosides-docusate sodium (SENOKOT-S) 8.6-50 MG tablet 1 tablet, 1 tablet, Oral, BID  magnesium hydroxide (MILK OF MAGNESIA) 400 MG/5ML suspension 30 mL, 30 mL, Oral, Daily PRN  perflutren lipid microspheres (DEFINITY) injection 1.65 mg, 1.5 mL, Intravenous, ONCE PRN  atorvastatin (LIPITOR) tablet 10 mg, 10 mg, Oral, Every Other Day  clonazePAM (KLONOPIN) tablet 0.5 mg, 0.5 mg, Oral, Daily  promethazine (PHENERGAN) tablet 12.5 mg, 12.5 mg, Oral, Q6H PRN **OR** ondansetron (ZOFRAN) injection 4 mg, 4 mg, Intravenous, Q6H PRN  polyethylene glycol (GLYCOLAX) packet 17 g, 17 g, Oral, Daily PRN  traMADol (ULTRAM) tablet 50 mg, 50 mg, Oral, Q6H PRN **OR** traMADol (ULTRAM) tablet 100 mg, 100 mg, Oral, Q6H PRN  acetaminophen (TYLENOL) tablet 500 mg, 500 mg, Oral, Q6H PRN    ASSESSMENT AND PLAN      Femoral neck fracture    Plan:  WBAT  INR pending  Plan on discharge to [facility].

## 2021-03-28 LAB
BLOOD CULTURE, ROUTINE: NORMAL
CULTURE, BLOOD 2: NORMAL

## 2021-03-29 NOTE — DISCHARGE SUMMARY
Discharge Summary    Lida Powers  :  1930  MRN:  [id number]    ADMIT DATE:  3/19/2021  DISCHARGE DATE:  3/29/2021    PRIMARY CARE PHYSICIAN:  Clementina Riggins MD    VISIT STATUS: Observation    CODE STATUS:  Prior    DISCHARGE DIAGNOSES:  Active Problems:    Left displaced femoral neck fracture ([address]  Resolved Problems:    * No resolved hospital problems. *      HOSPITAL COURSE:  Patient was admitted after suffering a mechanical fall. He was found to have a femoral neck fracture. He was medically evaluated and cleared and underwent a hemiarthroplasty. Postoperatively was somnolent so he was placed in the intermediate care unit for a period of time. He did become more lucent but was very slow with therapy and was not eating well. He had a swallow study done which she did well on however did not passed physical therapy. Arrangements were made for him to go to an extended care facility. SIGNIFICANT DIAGNOSTIC STUDIES:    Swallowing study  CONSULTANTS:   hospitalist, Cardiology  RECOMMENDED NEXT STEPS:    admit to rehab in a stable condition     DISCHARGE MEDICATIONS:       Charleen Chatman   [facility] Medication Instructions Q:[id number]    Printed on:21 1144   Medication Information                      atorvastatin (LIPITOR) 10 MG tablet  Take 1 tablet by mouth every other day             clonazePAM (KLONOPIN) 0.5 MG tablet  TAKE ONE TABLET BY MOUTH NIGHTLY AS NEEDED FOR ANXIETY FOR UP TO 30 DAYS             warfarin (COUMADIN) 4 MG tablet  Take 1 tablet by mouth every other day Alternating with 5 mg             warfarin (COUMADIN) 5 MG tablet  Take 1 tablet by mouth every other day Alternating with 4 mg                 DIET: No diet orders on file    ACTIVITY: No restriction.   up with assist  ______________________________________________________________________  COMPLEXITY OF FOLLOW UP:   [] Moderate Complexity: follow up within 7-14 calendar days (58978)   [] Severe Complexity: follow up

## 2021-04-01 NOTE — PROGRESS NOTES
Physician Progress Note      PATIENT:               Jeane Javier  CSN #:                  [id number]  :                       1930  ADMIT DATE:       3/19/2021 11:11 AM  DISCH DATE:        3/26/2021 12:30 PM  RESPONDING  PROVIDER #:        Brittani Padilla MD          QUERY TEXT:    Pt admitted with femur fracture s/p  hemiarthroplasty and has anemia   documented. If possible, please document in progress notes and discharge   summary further specificity regarding the acuity and type of anemia:    The medical record reflects the following:  Risk Factors: Post op L hip hemiarthroplasty  Clinical Indicators: H&H 13.8/43.1, 3/22- 10.5/33.4, 3/23- 9.8/30.7. No overt   signs of bleeding  Treatment: Monitoring labs. Thank you, Martha Aguilar RN [facility] [phone]  Options provided:  -- Anemia due to acute blood loss  -- Anemia due to postoperative blood loss  -- Other - I will add my own diagnosis  -- Disagree - Not applicable / Not valid  -- Disagree - Clinically unable to determine / Unknown  -- Refer to Clinical Documentation Reviewer    PROVIDER RESPONSE TEXT:    This patient has acute blood loss anemia.     Query created by: Jim Harding on 3/26/2021 6:28 AM      Electronically signed by:  Brittani Padilla MD 2021 7:50 AM

## 2021-04-04 ENCOUNTER — APPOINTMENT (OUTPATIENT)
Dept: GENERAL RADIOLOGY | Age: 86
End: 2021-04-04
Payer: MEDICARE

## 2021-04-04 ENCOUNTER — HOSPITAL ENCOUNTER (EMERGENCY)
Age: 86
Discharge: HOME | End: 2021-04-04
Attending: EMERGENCY MEDICINE
Payer: MEDICARE

## 2021-04-04 ENCOUNTER — APPOINTMENT (OUTPATIENT)
Dept: CT IMAGING | Age: 86
End: 2021-04-04
Payer: MEDICARE

## 2021-04-04 VITALS
SYSTOLIC BLOOD PRESSURE: 146 MMHG | DIASTOLIC BLOOD PRESSURE: 67 MMHG | TEMPERATURE: 99.7 F | WEIGHT: 165.1 LBS | HEART RATE: 89 BPM | RESPIRATION RATE: 22 BRPM | BODY MASS INDEX: 27.51 KG/M2 | HEIGHT: 65 IN | OXYGEN SATURATION: 98 %

## 2021-04-04 DIAGNOSIS — S51.012A SKIN TEAR OF LEFT ELBOW WITHOUT COMPLICATION, INITIAL ENCOUNTER: ICD-10-CM

## 2021-04-04 DIAGNOSIS — D68.59 HYPERCOAGULABLE STATE (HCC): Primary | ICD-10-CM
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
## 2021-04-04 LAB
ANION GAP SERPL CALCULATED.3IONS-SCNC: 11 MMOL/L (ref 7–16)
BACTERIA: ABNORMAL /HPF
BASOPHILS ABSOLUTE: 0 E9/L (ref 0–0.2)
BASOPHILS RELATIVE PERCENT: 0.2 % (ref 0–2)
BILIRUBIN URINE: NEGATIVE
BLOOD, URINE: NEGATIVE
BUN BLDV-MCNC: 18 MG/DL (ref 8–23)
CALCIUM SERPL-MCNC: 8.9 MG/DL (ref 8.6–10.2)
CHLORIDE BLD-SCNC: 99 MMOL/L (ref 98–107)
CLARITY: CLEAR
CO2: 29 MMOL/L (ref 22–29)
COLOR: YELLOW
CREAT SERPL-MCNC: 0.8 MG/DL (ref 0.7–1.2)
EOSINOPHILS ABSOLUTE: 0 E9/L (ref 0.05–0.5)
EOSINOPHILS RELATIVE PERCENT: 0.3 % (ref 0–6)
EPITHELIAL CELLS, UA: ABNORMAL /HPF
GFR AFRICAN AMERICAN: >60
GFR NON-AFRICAN AMERICAN: >60 ML/MIN/1.73
GLUCOSE BLD-MCNC: 121 MG/DL (ref 74–99)
GLUCOSE URINE: NEGATIVE MG/DL
HCT VFR BLD CALC: 32.3 % (ref 37–54)
HEMOGLOBIN: 10.1 G/DL (ref 12.5–16.5)
HYALINE CASTS: ABNORMAL /LPF (ref 0–2)
HYPOCHROMIA: ABNORMAL
INR BLD: 3.7
KETONES, URINE: NEGATIVE MG/DL
LEUKOCYTE ESTERASE, URINE: NEGATIVE
LYMPHOCYTES ABSOLUTE: 0.73 E9/L (ref 1.5–4)
LYMPHOCYTES RELATIVE PERCENT: 6.8 % (ref 20–42)
MCH RBC QN AUTO: 31.3 PG (ref 26–35)
MCHC RBC AUTO-ENTMCNC: 31.3 % (ref 32–34.5)
MCV RBC AUTO: 100 FL (ref 80–99.9)
MONOCYTES ABSOLUTE: 1.04 E9/L (ref 0.1–0.95)
MONOCYTES RELATIVE PERCENT: 10.3 % (ref 2–12)
MYELOCYTE PERCENT: 0.9 % (ref 0–0)
NEUTROPHILS ABSOLUTE: 8.63 E9/L (ref 1.8–7.3)
NEUTROPHILS RELATIVE PERCENT: 82.1 % (ref 43–80)
NITRITE, URINE: NEGATIVE
OVALOCYTES: ABNORMAL
PDW BLD-RTO: 15.7 FL (ref 11.5–15)
PH UA: 6 (ref 5–9)
PLATELET # BLD: 388 E9/L (ref 130–450)
PMV BLD AUTO: 9.2 FL (ref 7–12)
POIKILOCYTES: ABNORMAL
POLYCHROMASIA: ABNORMAL
POTASSIUM SERPL-SCNC: 4.3 MMOL/L (ref 3.5–5)
PROTEIN UA: ABNORMAL MG/DL
PROTHROMBIN TIME: 44 SEC (ref 9.3–12.4)
RBC # BLD: 3.23 E12/L (ref 3.8–5.8)
RBC UA: ABNORMAL /HPF (ref 0–2)
SODIUM BLD-SCNC: 139 MMOL/L (ref 132–146)
SPECIFIC GRAVITY UA: >=1.03 (ref 1–1.03)
UROBILINOGEN, URINE: 4 E.U./DL
WBC # BLD: 10.4 E9/L (ref 4.5–11.5)
WBC UA: ABNORMAL /HPF (ref 0–5)

## 2021-04-04 PROCEDURE — 85025 COMPLETE CBC W/AUTO DIFF WBC: CPT

## 2021-04-04 PROCEDURE — 99285 EMERGENCY DEPT VISIT HI MDM: CPT

## 2021-04-04 PROCEDURE — 81001 URINALYSIS AUTO W/SCOPE: CPT

## 2021-04-04 PROCEDURE — 70450 CT HEAD/BRAIN W/O DYE: CPT

## 2021-04-04 PROCEDURE — 73502 X-RAY EXAM HIP UNI 2-3 VIEWS: CPT

## 2021-04-04 PROCEDURE — 80048 BASIC METABOLIC PNL TOTAL CA: CPT

## 2021-04-04 PROCEDURE — 71046 X-RAY EXAM CHEST 2 VIEWS: CPT

## 2021-04-04 PROCEDURE — 85610 PROTHROMBIN TIME: CPT

## 2021-04-04 PROCEDURE — 72125 CT NECK SPINE W/O DYE: CPT

## 2021-04-04 NOTE — ED PROVIDER NOTES
Musculoskeletal: Normal range of motion and neck supple. No neck rigidity or muscular tenderness. Cardiovascular:      Rate and Rhythm: Normal rate and regular rhythm. Heart sounds: Normal heart sounds. No murmur. Pulmonary:      Effort: Pulmonary effort is normal. No respiratory distress. Breath sounds: Normal breath sounds. No wheezing or rales. Abdominal:      General: Bowel sounds are normal.      Palpations: Abdomen is soft. Tenderness: There is no abdominal tenderness. There is no guarding or rebound. Musculoskeletal: Normal range of motion. General: No tenderness. Right lower leg: No edema. Left lower leg: No edema. Comments: Feeling surgical scar of the left hip. No spinal tenderness. Able to active flex and extend the upper extremities and the right lower extremity. Did not move the left lower extremity secondary to recent surgical repair. Lymphadenopathy:      Cervical: No cervical adenopathy. Skin:     General: Skin is warm and dry. Capillary Refill: Capillary refill takes 2 to 3 seconds. Comments: Small skin tear of the left elbow. Neurological:      General: No focal deficit present. Mental Status: He is alert. Mental status is at baseline. Cranial Nerves: No cranial nerve deficit. Sensory: No sensory deficit. Motor: Weakness (generalized) present. Coordination: Coordination normal.         Procedures    MDM             --------------------------------------------- PAST HISTORY ---------------------------------------------  Past Medical History:  has a past medical history of Atrial fibrillation ([facility] Cancer ([facility] Hyperlipidemia, Hypertension, and Syncope and collapse. Past Surgical History:  has a past surgical history that includes Prostatectomy; pacemaker placement (Left, 2-8-2013); Cardioversion (2/22/2010); Cardioversion (8/11/2011);  Cataract removal with implant; eye surgery; Prostatectomy (02/01/1988); Colonoscopy (09/19/2007); doppler echocardiography (08/29/2019); and hip surgery (Left, 3/21/2021). Social History:  reports that he has quit smoking. He has never used smokeless tobacco. He reports current alcohol use. He reports that he does not use drugs. Family History: family history includes Heart Surgery in an other family member. The patients home medications have been reviewed. Allergies: Patient has no known allergies.     -------------------------------------------------- RESULTS -------------------------------------------------  Labs:  Results for orders placed or performed during the hospital encounter of 04/04/21   CBC Auto Differential   Result Value Ref Range    WBC 10.4 4.5 - 11.5 E9/L    RBC 3.23 (L) 3.80 - 5.80 E12/L    Hemoglobin 10.1 (L) 12.5 - 16.5 g/dL    Hematocrit 32.3 (L) 37.0 - 54.0 %    .0 (H) 80.0 - 99.9 fL    MCH 31.3 26.0 - 35.0 pg    MCHC 31.3 (L) 32.0 - 34.5 %    RDW 15.7 (H) 11.5 - 15.0 fL    Platelets 928 276 - 111 E9/L    MPV 9.2 7.0 - 12.0 fL    Neutrophils % 82.1 (H) 43.0 - 80.0 %    Lymphocytes % 6.8 (L) 20.0 - 42.0 %    Monocytes % 10.3 2.0 - 12.0 %    Eosinophils % 0.3 0.0 - 6.0 %    Basophils % 0.2 0.0 - 2.0 %    Neutrophils Absolute 8.63 (H) 1.80 - 7.30 E9/L    Lymphocytes Absolute 0.73 (L) 1.50 - 4.00 E9/L    Monocytes Absolute 1.04 (H) 0.10 - 0.95 E9/L    Eosinophils Absolute 0.00 (L) 0.05 - 0.50 E9/L    Basophils Absolute 0.00 0.00 - 0.20 E9/L    Myelocyte Percent 0.9 0 - 0 %    Polychromasia 1+     Hypochromia 1+     Poikilocytes 1+     Ovalocytes 1+    Basic Metabolic Panel   Result Value Ref Range    Sodium 139 132 - 146 mmol/L    Potassium 4.3 3.5 - 5.0 mmol/L    Chloride 99 98 - 107 mmol/L    CO2 29 22 - 29 mmol/L    Anion Gap 11 7 - 16 mmol/L    Glucose 121 (H) 74 - 99 mg/dL    BUN 18 8 - 23 mg/dL    CREATININE 0.8 0.7 - 1.2 mg/dL    GFR Non-African American >60 >=60 mL/min/1.73    GFR African American >60     Calcium 8.9 8.6 - 10.2 mg/dL   Protime-INR   Result Value Ref Range    Protime 44.0 (H) 9.3 - 12.4 sec    INR 3.7        Radiology:  XR HIP LEFT (2-3 VIEWS)   Final Result   There is no acute fracture or dislocation in the pelvis or left hip. XR CHEST (2 VW)   Final Result   1. Probable bilateral scarring or atelectasis. 2. No definite acute airspace disease seen. CT HEAD WO CONTRAST   Final Result   No significant interval changes since recent examinations of March 19 and   March 23. No indication for acute intracranial process. CT CERVICAL SPINE WO CONTRAST   Final Result   No acute abnormality of the cervical spine. Advanced degenerative change. No significant change.             ------------------------- NURSING NOTES AND VITALS REVIEWED ---------------------------  Date / Time Roomed:  4/4/2021  2:11 PM  ED Bed Assignment:  04/04    The nursing notes within the ED encounter and vital signs as below have been reviewed. BP (!) 146/67   Pulse 89   Temp 99.7 °F (37.6 °C) (Oral)   Resp 22   Ht 5' 5\" (1.651 m)   Wt 165 lb 1.6 oz (74.9 kg)   SpO2 98%   BMI 27.47 kg/m²   Oxygen Saturation Interpretation: Normal      ------------------------------------------ PROGRESS NOTES ------------------------------------------  I have spoken with the patient and daughter and discussed todays results, in addition to providing specific details for the plan of care and counseling regarding the diagnosis and prognosis. Their questions are answered at this time and they are agreeable with the plan. I discussed at length with them reasons for immediate return here for re evaluation. They will followup with primary care by calling their office tomorrow. Bandage placed on the skin tear. SNF advised to hold Coumadin on 4/5/21 and recheck INR 4/6/21. Discussed results with the daughter who states understanding.   Patient had no decline in status during ED course.  --------------------------------- RAISSA (acute kidney injury)

## 2021-04-04 NOTE — ED NOTES
Notified daughter Janee Vacakward of transport arrival and departure back to [facility]  04/04/21 0999

## 2021-11-19 ENCOUNTER — IMMUNIZATION (OUTPATIENT)
Dept: PRIMARY CARE CLINIC | Age: 86
End: 2021-11-19
Payer: MEDICARE

## 2021-11-19 PROCEDURE — 0064A COVID-19, MODERNA BOOSTER VACCINE 0.25ML DOSE: CPT | Performed by: STUDENT IN AN ORGANIZED HEALTH CARE EDUCATION/TRAINING PROGRAM

## 2021-11-19 PROCEDURE — 91306 COVID-19, MODERNA BOOSTER VACCINE 0.25ML DOSE: CPT | Performed by: STUDENT IN AN ORGANIZED HEALTH CARE EDUCATION/TRAINING PROGRAM

## 2022-01-10 ENCOUNTER — HOSPITAL ENCOUNTER (OUTPATIENT)
Dept: GENERAL RADIOLOGY | Age: 87
Discharge: HOME | End: 2022-01-12
Payer: MEDICARE

## 2022-01-10 DIAGNOSIS — R13.12 OROPHARYNGEAL DYSPHAGIA: ICD-10-CM

## 2022-01-10 PROCEDURE — 92611 MOTION FLUOROSCOPY/SWALLOW: CPT | Performed by: SPEECH-LANGUAGE PATHOLOGIST

## 2022-01-10 PROCEDURE — 92526 ORAL FUNCTION THERAPY: CPT | Performed by: SPEECH-LANGUAGE PATHOLOGIST

## 2022-01-10 PROCEDURE — 2500000003 HC RX 250 WO HCPCS: Performed by: INTERNAL MEDICINE

## 2022-01-10 PROCEDURE — 74230 X-RAY XM SWLNG FUNCJ C+: CPT

## 2022-01-10 RX ADMIN — BARIUM SULFATE 45 G: 0.6 CREAM ORAL at 08:32

## 2022-01-10 RX ADMIN — BARIUM SULFATE 45 G: 0.81 POWDER, FOR SUSPENSION ORAL at 08:31

## 2022-01-10 RX ADMIN — BARIUM SULFATE 45 ML: 400 SUSPENSION ORAL at 08:32

## 2022-01-10 NOTE — PROGRESS NOTES
Speech Language Pathology      NAME:  Lupillo Plunkett  :  1930  DATE: 1/10/2022  ROOM:  Room/bed info not found    Patient seen for swallow therapy 15 minutes. Reviewed current solid/liquid consistency diet recommendation for   Easy to Chew solids with  thin liquids (IDDSI level 0)and discussed compensatory strategies to ensure safe PO intake. Reviewed aspiration precautions. Discussed use of throat clear reswallow to decrease risk of aspiration with implementation of strengthening exercises to improve swallow function as the long term goal.  Patient was able to return demonstration of compensatory strategies during session. Patient will require ongoing education regarding dysphagia secondary to decreased ability to restate strategies during session.       Patient Active Problem List   Diagnosis    A-fib ([address]    Chronic anticoagulation    Hypertension    H/O prostate cancer    Orthostatic hypotension    Sinus bradycardia-tachycardia syndrome ([address]    Pacemaker    Hyperlipidemia    Vitamin B12 deficiency    Vitamin D deficiency    Closed fracture of multiple pubic rami, right, initial encounter ([address]    COPD (chronic obstructive pulmonary disease) (HCC)    Acute bronchitis    Acute respiratory failure with hypoxia ([facility])    Closed fracture of ramus of right pubis (HCC)    Back pain    Closed compression fracture of L1 lumbar vertebra    Sacral fracture, closed ([address]    Left displaced femoral neck fracture ([address]       [id number]  dysphagia janee Suh MSCCC/SLP  Speech Language Pathologist  [phone]

## 2022-01-10 NOTE — PROGRESS NOTES
SPEECH/LANGUAGE PATHOLOGY  VIDEOFLUOROSCOPIC STUDY OF SWALLOWING (MBS)   and PLAN OF CARE    PATIENT NAME:  Brianda Giraldo  (male)     MRN:  [id number]    :  1930  (80 y.o.)  STATUS:  Inpatient: Room Room/bed info not found    TODAY'S DATE:  1/10/2022  REFERRING PROVIDER:  Chris Coburn   SPECIFIC PROVIDER ORDER: FL modified barium swallow with video  Date of order:  21   REASON FOR REFERRAL: dysphagia    EVALUATING THERAPIST: KARTHIK Pena      RESULTS:      DYSPHAGIA DIAGNOSIS:  moderate oropharyngeal phase dysphagia     DIET RECOMMENDATIONS:  Easy to chew consistency solids (IDDSI level 7, transitional) with  thin liquids (IDDSI level 0) with a throat clear    FEEDING RECOMMENDATIONS:    Assistance level:  Supervision is needed during all oral intake     Compensatory strategies recommended: Double swallow, Effortful swallow, Small bites/sips, Alternate solids and liquids and Throat clear     Discussed recommendations with nursing and/or faxed report to referring provider: Yes    Laryngeal Penetration and Aspiration:  Penetration WITH (very trace) aspiration was observed in today's study with  thin liquid    SPEECH THERAPY  PLAN OF CARE   The dysphagia POC is established based on physician order and dysphagia diagnosis    Outpatient OR Home Care Skilled SLP intervention for dysphagia management is recommended 1-2 times per week to address the established treatment plan      Conditions Requiring Skilled Therapeutic Intervention for dysphagia:    Reduced pharyngeal clearing of the bolus  Reduced laryngeal closure resulting in penetration  Reduced laryngeal closure resulting in aspiration     SPECIFIC DYSPHAGIA INTERVENTIONS TO INCLUDE:     Training in positioning for improved integrity of swallow  Compensatory strategy training   Therapeutic exercises    Specific instructions for next treatment:  development and training of compensatory swallow strategies to improve airway protection and swallow function  Treatment Goals:    Short Term Goals:  Pt will implement identified compensatory swallowing strategies on 90% of opportunities or greater to improve airway protection and swallow function. Pt will complete BOTR strength/ ROM exercises to reduce pharyngeal residuals and improve epiglottic inversion minimal verbal prompts  Pt will complete laryngeal strength/ ROM therapeutic exercises to improve airway protection for the least restrictive PO diet minimal verbal prompts  Pt will complete Effortful Swallow therapeutically to target increased oral and base of tongue pressure, increased pharyngeal constrictor contractions, and increased UES relaxation duration to reduce pharyngeal residue with minimal verbal prompts     Long Term Goals:   Pt will improve oropharyngeal swallow function to ensure airway protection during PO intake to maintain adequate nutrition/hydration and decrease signs/symptoms of aspiration to less than 1 x/day.       Patient/family Goal:    To eat/drink without coughing or choking    Plan of care discussed with Patient and Family   The Patient did not demonstrate complete understanding of the diagnosis, prognosis and plan of care and Family understand(s) the diagnosis, prognosis and plan of care     Rehabilitation Potential/Prognosis: good                      ADMITTING DIAGNOSIS: Oropharyngeal dysphagia [R13.12]     VISIT DIAGNOSIS:   Visit Diagnoses       Codes    Oropharyngeal dysphagia     R13.12              PATIENT REPORT/COMPLAINT: inconsistent cough with intake     PRIOR LEVEL OF SWALLOW FUNCTION:    Past History of Dysphagia?:  yes    Home diet: Regular consistency solids (IDDSI level 7) with  thin liquids (IDDSI level 0)    PROCEDURE:  Consistencies Administered During the Evaluation   Liquids: thin liquid and nectar thick liquid   Solids:  pureed foods and solid foods      Method of Intake:   cup, spoon  Self fed, Fed by clinician      Position:   Seated, upright, Lateral plane    INSTRUMENTAL ASSESSMENT:    [facility] Results:   Lip closure for intraoral bolus containment resulted in no labial escape. Tongue control during bolus hold maintained a cohesive bolus held between tongue to palate seal. Bolus preparation and mastication resulted in slow but efficient chewing and mashing. Bolus transport/lingual motion was with brisk tongue motion. Oral residue was not observed. There was complete oral clearance. Initiation of the pharyngeal swallow occurred as the bolus head reached the pyriform sinuses. Soft palate elevation resulted in no bolus between the soft palate and the pharyngeal wall. Laryngeal elevation demonstrated partial superior movement of the thyroid cartilage with partial approximation of the arytenoids to the epiglottic petiole. Anterior hyoid excursion demonstrated partial anterior movement. Epiglottic movement resulted in partial inversion. Laryngeal vestibule closure was incomplete, as indicated by a narrow column of air or contrast within the laryngeal vestibule at the height of the swallow. Pharyngeal stripping wave was present but diminished. Pharyngeal contraction could not be determined due to logistical reasons not related to physiologic impairment. Pharyngoesophageal segment opening was completely distended for complete duration with no obstruction of bolus flow. Tongue base retraction allowed a collection of residue between the retracted tongue base and the posterior pharyngeal wall. A collection of residue remained within or on the pharyngeal structures. This increased residue with thicker items did increase penetration and trace aspiration of thin to vocal folds cued throat clear was effective. Esophageal clearance in the upright position could not be assessed due to logistical reasons not related to physiologic impairment.    PENETRATION-ASPIRATION SCALE (PAS):  THIN 5 = Material enters the airway, contacts the vocal folds, and is not ejected from the airway  MILDLY THICK 1 = Material does not enter the airway  MODERATELY THICK item not administered  PUREE 1 = Material does not enter the airway  HARD SOLID 1 = Material does not enter the airway       COMPENSATORY STRATEGIES    Compensatory strategies that were beneficial included Throat clear      STRUCTURAL/FUNCTIONAL ANOMALIES   No structural/functional anomalies were noted    CERVICAL ESOPHAGEAL STAGE :     The cervical esophagus appeared adequate          ___________    Cognition:   Within functional limits for this exam    Oral Peripheral Examination   Adequate lingual/labial strength     Current Respiratory Status   room air     Parameters of Speech Production  Respiration:  Adequate for speech production  Quality:   Within functional limits  Intensity: Within functional limits    Pain: No pain reported. EDUCATION:   The Speech Language Pathologist (SLP) completed education regarding results of evaluation and that intervention is warranted at this time. Learner: Patient and Family  Education: Reviewed results and recommendations of this evaluation  Evaluation of Education:  Jackie Counts understanding    This plan may be re-evaluated and revised as warranted. Evaluation Time includes thorough review of current medical information, gathering information on past medical history/social history and prior level of function, completion of standardized testing/informal observation of tasks, assessment of data and education on plan of care and goals. [x]The admitting diagnosis and active problem list, have been reviewed prior to initiation of this evaluation.     CPT Code: 68896  dysphagia study    ACTIVE PROBLEM LIST:   Patient Active Problem List   Diagnosis    A-fib ([facility] [address]    Chronic anticoagulation    Hypertension    H/O prostate cancer    Orthostatic hypotension    Sinus bradycardia-tachycardia syndrome ([facility] [address]    Pacemaker    Hyperlipidemia    Vitamin B12 deficiency    Vitamin D deficiency  Closed fracture of multiple pubic rami, right, initial encounter ([facility] [address]    COPD (chronic obstructive pulmonary disease) (HCC)    Acute bronchitis    Acute respiratory failure with hypoxia ([facility])    Closed fracture of ramus of right pubis ([facility])    Back pain    Closed compression fracture of L1 lumbar vertebra    Sacral fracture, closed ([facility] [address]    Left displaced femoral neck fracture ([facility] [address]       Myriam Blanchard MSCCC/SLP  Speech Language Pathologist  [id number]

## 2022-06-03 ENCOUNTER — APPOINTMENT (OUTPATIENT)
Dept: ULTRASOUND IMAGING | Age: 87
End: 2022-06-03
Payer: MEDICARE

## 2022-06-03 ENCOUNTER — APPOINTMENT (OUTPATIENT)
Dept: CT IMAGING | Age: 87
End: 2022-06-03
Payer: MEDICARE

## 2022-06-03 ENCOUNTER — HOSPITAL ENCOUNTER (EMERGENCY)
Age: 87
Discharge: HOME | End: 2022-06-03
Attending: EMERGENCY MEDICINE
Payer: MEDICARE

## 2022-06-03 VITALS
TEMPERATURE: 97.4 F | RESPIRATION RATE: 16 BRPM | HEART RATE: 69 BPM | OXYGEN SATURATION: 100 % | DIASTOLIC BLOOD PRESSURE: 82 MMHG | SYSTOLIC BLOOD PRESSURE: 172 MMHG

## 2022-06-03 DIAGNOSIS — R79.1 ELEVATED INR: ICD-10-CM

## 2022-06-03 DIAGNOSIS — R07.9 CHEST PAIN, UNSPECIFIED TYPE: Primary | ICD-10-CM

## 2022-06-03 LAB
ALBUMIN SERPL-MCNC: 3.8 G/DL (ref 3.5–5.2)
ALP BLD-CCNC: 73 U/L (ref 40–129)
ALT SERPL-CCNC: 7 U/L (ref 0–40)
ANION GAP SERPL CALCULATED.3IONS-SCNC: 11 MMOL/L (ref 7–16)
AST SERPL-CCNC: 21 U/L (ref 0–39)
BASOPHILS ABSOLUTE: 0.01 E9/L (ref 0–0.2)
BASOPHILS RELATIVE PERCENT: 0.1 % (ref 0–2)
BILIRUB SERPL-MCNC: 1.1 MG/DL (ref 0–1.2)
BUN BLDV-MCNC: 22 MG/DL (ref 6–23)
CALCIUM SERPL-MCNC: 9 MG/DL (ref 8.6–10.2)
CHLORIDE BLD-SCNC: 102 MMOL/L (ref 98–107)
CO2: 25 MMOL/L (ref 22–29)
CREAT SERPL-MCNC: 0.8 MG/DL (ref 0.7–1.2)
D DIMER: <200 NG/ML DDU
EKG ATRIAL RATE: 65 BPM
EKG Q-T INTERVAL: 440 MS
EKG QRS DURATION: 90 MS
EKG QTC CALCULATION (BAZETT): 457 MS
EKG R AXIS: 32 DEGREES
EKG T AXIS: -71 DEGREES
EKG VENTRICULAR RATE: 65 BPM
EOSINOPHILS ABSOLUTE: 0.15 E9/L (ref 0.05–0.5)
EOSINOPHILS RELATIVE PERCENT: 2.1 % (ref 0–6)
GFR AFRICAN AMERICAN: >60
GFR NON-AFRICAN AMERICAN: >60 ML/MIN/1.73
GLUCOSE BLD-MCNC: 109 MG/DL (ref 74–99)
HCT VFR BLD CALC: 44.1 % (ref 37–54)
HEMOGLOBIN: 14.4 G/DL (ref 12.5–16.5)
IMMATURE GRANULOCYTES #: 0.02 E9/L
IMMATURE GRANULOCYTES %: 0.3 % (ref 0–5)
INR BLD: 3.9
LYMPHOCYTES ABSOLUTE: 1.21 E9/L (ref 1.5–4)
LYMPHOCYTES RELATIVE PERCENT: 16.7 % (ref 20–42)
MAGNESIUM: 2.1 MG/DL (ref 1.6–2.6)
MCH RBC QN AUTO: 32.7 PG (ref 26–35)
MCHC RBC AUTO-ENTMCNC: 32.7 % (ref 32–34.5)
MCV RBC AUTO: 100.2 FL (ref 80–99.9)
MONOCYTES ABSOLUTE: 1.21 E9/L (ref 0.1–0.95)
MONOCYTES RELATIVE PERCENT: 16.7 % (ref 2–12)
NEUTROPHILS ABSOLUTE: 4.65 E9/L (ref 1.8–7.3)
NEUTROPHILS RELATIVE PERCENT: 64.1 % (ref 43–80)
PDW BLD-RTO: 13.8 FL (ref 11.5–15)
PLATELET # BLD: 149 E9/L (ref 130–450)
PMV BLD AUTO: 9.9 FL (ref 7–12)
POTASSIUM SERPL-SCNC: 4.4 MMOL/L (ref 3.5–5)
PRO-BNP: 1449 PG/ML (ref 0–450)
PROTHROMBIN TIME: 45.2 SEC (ref 9.3–12.4)
RBC # BLD: 4.4 E12/L (ref 3.8–5.8)
SARS-COV-2, NAAT: NOT DETECTED
SODIUM BLD-SCNC: 138 MMOL/L (ref 132–146)
TOTAL PROTEIN: 7.4 G/DL (ref 6.4–8.3)
TROPONIN, HIGH SENSITIVITY: 13 NG/L (ref 0–11)
TROPONIN, HIGH SENSITIVITY: 15 NG/L (ref 0–11)
WBC # BLD: 7.3 E9/L (ref 4.5–11.5)

## 2022-06-03 PROCEDURE — 36415 COLL VENOUS BLD VENIPUNCTURE: CPT

## 2022-06-03 PROCEDURE — 83735 ASSAY OF MAGNESIUM: CPT

## 2022-06-03 PROCEDURE — 93005 ELECTROCARDIOGRAM TRACING: CPT | Performed by: EMERGENCY MEDICINE

## 2022-06-03 PROCEDURE — 84484 ASSAY OF TROPONIN QUANT: CPT

## 2022-06-03 PROCEDURE — 87635 SARS-COV-2 COVID-19 AMP PRB: CPT

## 2022-06-03 PROCEDURE — 85610 PROTHROMBIN TIME: CPT

## 2022-06-03 PROCEDURE — 99284 EMERGENCY DEPT VISIT MOD MDM: CPT

## 2022-06-03 PROCEDURE — 80053 COMPREHEN METABOLIC PANEL: CPT

## 2022-06-03 PROCEDURE — 85025 COMPLETE CBC W/AUTO DIFF WBC: CPT

## 2022-06-03 PROCEDURE — 93970 EXTREMITY STUDY: CPT

## 2022-06-03 PROCEDURE — 83880 ASSAY OF NATRIURETIC PEPTIDE: CPT

## 2022-06-03 PROCEDURE — 85378 FIBRIN DEGRADE SEMIQUANT: CPT

## 2022-06-03 RX ORDER — FENTANYL CITRATE 0.05 MG/ML
25 INJECTION, SOLUTION INTRAMUSCULAR; INTRAVENOUS ONCE
Status: DISCONTINUED | OUTPATIENT
Start: 2022-06-03 | End: 2022-06-03 | Stop reason: HOSPADM

## 2022-06-03 ASSESSMENT — PAIN - FUNCTIONAL ASSESSMENT
PAIN_FUNCTIONAL_ASSESSMENT: NONE - DENIES PAIN
PAIN_FUNCTIONAL_ASSESSMENT: 0-10

## 2022-06-03 ASSESSMENT — ENCOUNTER SYMPTOMS
CHEST TIGHTNESS: 0
NAUSEA: 0
ABDOMINAL PAIN: 0
DIARRHEA: 0
SHORTNESS OF BREATH: 0
COUGH: 0
SORE THROAT: 0
VOMITING: 0
BACK PAIN: 0
WHEEZING: 0

## 2022-06-03 ASSESSMENT — PAIN DESCRIPTION - PAIN TYPE: TYPE: ACUTE PAIN

## 2022-06-03 ASSESSMENT — PAIN DESCRIPTION - ONSET: ONSET: AWAKENED FROM SLEEP

## 2022-06-03 NOTE — ED NOTES
Ice is on previous infiltrate site to [facility].      Javier Lee  06/03/22 1920

## 2022-06-03 NOTE — ED NOTES
IV attempted multiple times by multiple nurses, unable to obtain IV access, Dr. Robyn cabello.       Yvan Hannah  06/03/22 5493

## 2022-06-03 NOTE — ED PROVIDER NOTES
Chief complaint:  Chest pain    HPI history provided by the patient and family  Patient presents here with a history of intermittent atrial fibrillation, on Coumadin. Routine INR's are around 2.0. Developed general anterior chest pain earlier this morning with mild radiation to the back, symptoms are intermittent and mostly related to movement and deep breathing. No fevers or productive cough. No shortness of breath or palpitations. No lightheadedness or syncope. No current pain unless he takes a very deep breath. No abdominal pain. No nausea vomiting or diarrhea. No flank pain. No arm or leg pain or swelling, no history of blood clots. No treatment prior to arrival.    Review of Systems   Constitutional: Negative for chills, diaphoresis, fatigue and fever. HENT: Negative for congestion and sore throat. Respiratory: Negative for cough, chest tightness, shortness of breath and wheezing. Cardiovascular: Positive for chest pain. Negative for palpitations and leg swelling. Gastrointestinal: Negative for abdominal pain, diarrhea, nausea and vomiting. Genitourinary: Negative for dysuria, flank pain, frequency and urgency. Musculoskeletal: Negative for arthralgias, back pain, gait problem, joint swelling, myalgias, neck pain and neck stiffness. Skin: Negative for rash and wound. Neurological: Negative for dizziness, seizures, syncope, weakness, light-headedness, numbness and headaches. All other systems reviewed and are negative. Physical Exam  Vitals and nursing note reviewed. Constitutional:       General: He is not in acute distress. Appearance: He is well-developed. He is not ill-appearing, toxic-appearing or diaphoretic. HENT:      Head: Normocephalic and atraumatic. Eyes:      General: No scleral icterus. Pupils: Pupils are equal, round, and reactive to light. Cardiovascular:      Rate and Rhythm: Normal rate and regular rhythm.       Heart sounds: Normal heart sounds. No murmur heard. Pulmonary:      Effort: Pulmonary effort is normal. No respiratory distress. Breath sounds: Normal breath sounds. No stridor, decreased air movement or transmitted upper airway sounds. No decreased breath sounds, wheezing, rhonchi or rales. Chest:      Chest wall: No tenderness. Abdominal:      General: Bowel sounds are normal. There is no distension. Palpations: Abdomen is soft. Tenderness: There is no abdominal tenderness. There is no right CVA tenderness, left CVA tenderness, guarding or rebound. Musculoskeletal:         General: No swelling, tenderness, deformity or signs of injury. Cervical back: Normal range of motion and neck supple. No signs of trauma or rigidity. No pain with movement, spinous process tenderness or muscular tenderness. Normal range of motion. Right lower leg: No edema. Left lower leg: No edema. Comments: Arms and legs are neurovascular intact, no pretibial edema or calf pain. Skin:     General: Skin is warm and dry. Coloration: Skin is not cyanotic, jaundiced, mottled or pale. Findings: No erythema or rash. Neurological:      General: No focal deficit present. Mental Status: He is alert and oriented to person, place, and time. GCS: GCS eye subscore is 4. GCS verbal subscore is 5. GCS motor subscore is 6. Cranial Nerves: Cranial nerves are intact. No cranial nerve deficit. Sensory: Sensation is intact. Motor: Motor function is intact. Coordination: Coordination is intact. Coordination normal.          Procedures     [facility]     ED Course as of 06/03/22 1808   Fri Jun 03, 2022   1746 Nursing staff reports patient refused pain medicines that were ordered, states he is not having pain now. [NC]   5738 6:07 PM EDT  I have signed this patient out to the oncoming physician, Dr. Shawn Shelley.   I have discussed the patient's initial exam, treatment and plan of care with the on coming physician. CTA and second trop and dispo pending  Sarah Abdi [NC]      ED Course User Index  [NC] Lazarus Lopez DO     EKG Interpretation    Interpreted by emergency department physician    Rhythm: junctional  Rate: 65  Axis: normal  Ectopy: premature ventricular contractions (unifocal) and premature atrial contraction with paced beats  Conduction: nonspecific interventricular conduction block  ST Segments: nonspecific changes  T Waves: inversion in  v4, v5, v6, aVr and aVl  Q Waves: II and III    Clinical Impression: non-specific EKG  Compared to 3- and 3- no acute changes when consideration of paced beat versus native beats scattered throughout. Nikia Abdi DO    6:00 PM EDT  I received this patient at sign out from Dr. Latoya Molina.  I have discussed the patient's initial exam, treatment and plan of care with the out going physician. I have introduced my self to the patient / family and have answered their questions to this point. I have examined the patient myself and reviewed ordered tests / medications and  reviewed any available results to this point. If a resident is involved in the Emergency Department care, I have discussed my findings and plan with them as well. ED Course as of 06/04/22 1542   Fri Jun 03, 2022   1746 Nursing staff reports patient refused pain medicines that were ordered, states he is not having pain now. [NC]   4440 6:07 PM EDT  I have signed this patient out to the oncoming physician, Dr. Kala Franks. I have discussed the patient's initial exam, treatment and plan of care with the on coming physician.   CTA and second trop and dispo pending  Sarah Abdi [NC]      ED Course User Index  [NC] Nikia Abdi DO       --------------------------------------------- PAST HISTORY ---------------------------------------------  Past Medical History:  has a past medical history of Atrial fibrillation ([facility] [address] Cancer ([facility] [address] Hyperlipidemia, Hypertension, and Syncope and collapse. Past Surgical History:  has a past surgical history that includes Prostatectomy; pacemaker placement (Left, 2-8-2013); Cardioversion (2/22/2010); Cardioversion (8/11/2011); Cataract removal with implant; eye surgery; Prostatectomy (02/01/1988); Colonoscopy (09/19/2007); doppler echocardiography (08/29/2019); and hip surgery (Left, 3/21/2021). Social History:  reports that he has quit smoking. He has never used smokeless tobacco. He reports current alcohol use. He reports that he does not use drugs. Family History: family history includes Heart Surgery in an other family member. The patients home medications have been reviewed. Allergies: Patient has no known allergies.     -------------------------------------------------- RESULTS -------------------------------------------------  Labs:  Results for orders placed or performed during the hospital encounter of 06/03/22   COVID-19, Rapid    Specimen: Nasopharyngeal Swab   Result Value Ref Range    SARS-CoV-2, NAAT Not Detected Not Detected   CBC with Auto Differential   Result Value Ref Range    WBC 7.3 4.5 - 11.5 E9/L    RBC 4.40 3.80 - 5.80 E12/L    Hemoglobin 14.4 12.5 - 16.5 g/dL    Hematocrit 44.1 37.0 - 54.0 %    .2 (H) 80.0 - 99.9 fL    MCH 32.7 26.0 - 35.0 pg    MCHC 32.7 32.0 - 34.5 %    RDW 13.8 11.5 - 15.0 fL    Platelets 526 969 - 328 E9/L    MPV 9.9 7.0 - 12.0 fL    Neutrophils % 64.1 43.0 - 80.0 %    Immature Granulocytes % 0.3 0.0 - 5.0 %    Lymphocytes % 16.7 (L) 20.0 - 42.0 %    Monocytes % 16.7 (H) 2.0 - 12.0 %    Eosinophils % 2.1 0.0 - 6.0 %    Basophils % 0.1 0.0 - 2.0 %    Neutrophils Absolute 4.65 1.80 - 7.30 E9/L    Immature Granulocytes # 0.02 E9/L    Lymphocytes Absolute 1.21 (L) 1.50 - 4.00 E9/L    Monocytes Absolute 1.21 (H) 0.10 - 0.95 E9/L    Eosinophils Absolute 0.15 0.05 - 0.50 E9/L    Basophils Absolute 0.01 0.00 - 0.20 E9/L   Comprehensive Metabolic Panel   Result Value Ref Range    Sodium 138 132 - 146 mmol/L    Potassium 4.4 3.5 - 5.0 mmol/L    Chloride 102 98 - 107 mmol/L    CO2 25 22 - 29 mmol/L    Anion Gap 11 7 - 16 mmol/L    Glucose 109 (H) 74 - 99 mg/dL    BUN 22 6 - 23 mg/dL    CREATININE 0.8 0.7 - 1.2 mg/dL    GFR Non-African American >60 >=60 mL/min/1.73    GFR African American >60     Calcium 9.0 8.6 - 10.2 mg/dL    Total Protein 7.4 6.4 - 8.3 g/dL    Albumin 3.8 3.5 - 5.2 g/dL    Total Bilirubin 1.1 0.0 - 1.2 mg/dL    Alkaline Phosphatase 73 40 - 129 U/L    ALT 7 0 - 40 U/L    AST 21 0 - 39 U/L   Protime-INR   Result Value Ref Range    Protime 45.2 (H) 9.3 - 12.4 sec    INR 3.9    Brain Natriuretic Peptide   Result Value Ref Range    Pro-BNP 1,449 (H) 0 - 450 pg/mL   Troponin   Result Value Ref Range    Troponin, High Sensitivity 13 (H) 0 - 11 ng/L   Magnesium   Result Value Ref Range    Magnesium 2.1 1.6 - 2.6 mg/dL   Troponin   Result Value Ref Range    Troponin, High Sensitivity 15 (H) 0 - 11 ng/L   D-Dimer, Quantitative   Result Value Ref Range    D-Dimer, Quant <200 ng/mL DDU   EKG 12 Lead   Result Value Ref Range    Ventricular Rate 65 BPM    Atrial Rate 65 BPM    QRS Duration 90 ms    Q-T Interval 440 ms    QTc Calculation (Bazett) 457 ms    R Axis 32 degrees    T Axis -71 degrees       Radiology:  US DUP LOWER EXTREMITIES BILATERAL VENOUS   Final Result   No evidence of DVT in either lower extremity.             ------------------------- NURSING NOTES AND VITALS REVIEWED ---------------------------  Date / Time Roomed:  6/3/2022  3:12 PM  ED Bed Assignment:  [place] [id number]    The nursing notes within the ED encounter and vital signs as below have been reviewed. BP (!) 172/82 Comment: BP rechecked, Dr Sherry Braxton aware.   Pulse 69   Temp 97.4 °F (36.3 °C) (Oral)   Resp 16   SpO2 100%   Oxygen Saturation Interpretation: Normal      ------------------------------------------ PROGRESS NOTES ------------------------------------------  I have spoken with the patient and daughter and discussed todays results, in addition to providing specific details for the plan of care and counseling regarding the diagnosis and prognosis. Their questions are answered at this time and they are agreeable with the plan. I discussed at length with them reasons for immediate return here for re evaluation. They will followup with primary care by calling their office tomorrow. Unable to obtain adequate peripheral site for CTA. Shared decision making with daughter to perform US of the lower extremities. She is comfortable with discharge following evaluation and will take him for follow up with PCP. She is to hold the coumadin today and tomorrow. --------------------------------- ADDITIONAL PROVIDER NOTES ---------------------------------  At this time the patient is without objective evidence of an acute process requiring hospitalization or inpatient management. They have remained hemodynamically stable throughout their entire ED visit and are stable for discharge with outpatient follow-up. The plan has been discussed in detail and they are aware of the specific conditions for emergent return, as well as the importance of follow-up. Discharge Medication List as of 6/3/2022  7:39 PM          Diagnosis:  1. Chest pain, unspecified type    2. Elevated INR        Disposition:  Patient's disposition: Discharge to home  Patient's condition is stable.            Kj Padilla DO  06/04/22 1543

## 2022-11-03 ENCOUNTER — NURSE ONLY (OUTPATIENT)
Dept: NON INVASIVE DIAGNOSTICS | Age: 87
End: 2022-11-03

## 2022-11-03 VITALS — DIASTOLIC BLOOD PRESSURE: 60 MMHG | SYSTOLIC BLOOD PRESSURE: 118 MMHG

## 2022-12-19 ENCOUNTER — HOSPITAL ENCOUNTER (EMERGENCY)
Age: 87
Discharge: HOME | End: 2022-12-19
Payer: MEDICARE

## 2022-12-19 VITALS
HEART RATE: 72 BPM | WEIGHT: 140 LBS | SYSTOLIC BLOOD PRESSURE: 134 MMHG | HEIGHT: 66 IN | TEMPERATURE: 97.2 F | BODY MASS INDEX: 22.5 KG/M2 | RESPIRATION RATE: 18 BRPM | OXYGEN SATURATION: 100 % | DIASTOLIC BLOOD PRESSURE: 77 MMHG

## 2022-12-19 DIAGNOSIS — J06.9 UPPER RESPIRATORY TRACT INFECTION, UNSPECIFIED TYPE: Primary | ICD-10-CM

## 2022-12-19 PROCEDURE — 99211 OFF/OP EST MAY X REQ PHY/QHP: CPT

## 2022-12-19 RX ORDER — GUAIFENESIN/DEXTROMETHORPHAN 100-10MG/5
10 SYRUP ORAL 3 TIMES DAILY PRN
Qty: 120 ML | Refills: 0 | Status: SHIPPED | OUTPATIENT
Start: 2022-12-19 | End: 2022-12-29

## 2022-12-19 RX ORDER — AMOXICILLIN 250 MG/5ML
500 POWDER, FOR SUSPENSION ORAL 3 TIMES DAILY
Qty: 300 ML | Refills: 0 | Status: ON HOLD | OUTPATIENT
Start: 2022-12-19 | End: 2022-12-24 | Stop reason: HOSPADM

## 2022-12-19 ASSESSMENT — PAIN - FUNCTIONAL ASSESSMENT: PAIN_FUNCTIONAL_ASSESSMENT: 0-10

## 2022-12-19 NOTE — ED PROVIDER NOTES
Department of Emergency . [address] [facility]  Provider Note  Admit Date/RoomTime: 2022  3:05 PM  Room:   NAME: Vickie Mills  : 1930  MRN: [id number]     Chief Complaint:  Hoarse (Woke  up today sounding hoarse  and say he had so much mucus it  was hard to swallow  cough)    History of Present Illness       Anmol Brown is a 80 y.o. old male who presents to the urgent care center by private vehicle for relation. He is here with his daughter is his caregiver. He said that he has a lot of postnasal drainage and a lot of mucus production down the back of his throat his daughter says he always has it but today it was worse than usual and his voice is hoarse he does not have a fever is not complaining of chest pain or shortness of breath. Has not been exposed to anyone sick that he knows of. Not complaining of body aches  ROS    Pertinent positives and negatives are stated within HPI, all other systems reviewed and are negative. Past Surgical History:   Procedure Laterality Date    CARDIOVERSION  2010    Successful to NSR at [facility]. CARDIOVERSION  2011    Successful to NSR at [facility]. CATARACT REMOVAL WITH IMPLANT      COLONOSCOPY  2007    [facility] COLON- [facility]    DOPPLER ECHOCARDIOGRAPHY  2019    Dr Milo Hill abnormal left ventricular systolic function--mod severe tricuspid regurg w/mild pulmonary hyn--mild mitral, aortic, pulmonic regurg. EYE SURGERY      CATARACT    HIP SURGERY Left 3/21/2021    LEFT HIP HEMIARTHROPLASTY [facility]) performed by Yajaira Mazariegos MD at [facility] Left 2013    Dual chamber-Dr. Yee-[employer]    PROSTATECTOMY      20 years ago    PROSTATECTOMY  1988    RETROPUBIC RADICAL WITH/WO NERVE SPARING   Social History:  reports that he has quit smoking. He has never used smokeless tobacco. He reports current alcohol use.  He reports that he does not use drugs. Family History: family history includes Heart Surgery in an other family member. Allergies: Patient has no known allergies. Physical Exam            ED Triage Vitals [12/19/22 1506]   BP Temp Temp src Heart Rate Resp SpO2 Height Weight   134/77 97.2 °F (36.2 °C) -- 72 18 100 % 5' 6\" (1.676 m) 140 lb (63.5 kg)      Oxygen Saturation Interpretation: Normal.    Constitutional:  Alert, appears well, non toxic, no distress  Ears:  External Ears: Bilateral normal.               TM's & External Canals: normal appearance, normal TMs bilaterally. Nose:   There is no discharge, swelling or lesions noted. Sinuses: no bilateral maxillary sinus tenderness. no bilateral frontal sinus tenderness. Mouth:  normal tongue and buccal mucosa. Throat: Tonsils not visualized, no exudates, uvula not swollen no postnasal drainage seen. Airway patent. Neck/Lymphatics:  Neck Supple. Respiratory:   Breath sounds: bilateral normal.  Lung sounds: normal.   CV:  Regular rate and rhythm, normal heart sounds, without pathological murmurs, ectopy, gallops, or rubs. Integument:  .  Warm, dry, without visible rash. Neurological:  Oriented. Motor functions intact. Lab / Imaging Results   (All laboratory and radiology results have been personally reviewed by myself)  Labs:  No results found for this visit on 12/19/22. Imaging: All Radiology results interpreted by Radiologist unless otherwise noted.   No orders to display     ED Course / Medical Decision Making   Medications - No data to display       MDM:   Currently he always has a large amount of postnasal drainage but it was worse today and now his voice is hoarse and raspy he does not have any fever cough chest pain or shortness of breath no indications that he would have influenza or COVID at this point he has a chronic sinus drainage which is worse I did treat him with amoxicillin and some Robitussin-DM advised him and his daughter that he needs to get reevaluated if anything worsens or if he starts running a fever. 1. Upper respiratory tract infection, unspecified type      Plan   Discharge to home and advised to contact Farooq Solomon [phone] [phone]      As needed   Patient condition is good    New Medications     New Prescriptions    AMOXICILLIN (AMOXIL) 250 MG/5ML SUSPENSION    Take 10 mLs by mouth 3 times daily for 10 days    GUAIFENESIN-DEXTROMETHORPHAN (ROBITUSSIN DM) 100-10 MG/5ML SYRUP    Take 10 mLs by mouth 3 times daily as needed for Cough     Electronically signed by CAPRICE Watson CNP   DD: 12/19/22  **This report was transcribed using voice recognition software. Every effort was made to ensure accuracy; however, inadvertent computerized transcription errors may be present.   END OF ED PROVIDER NOTE      CAPRICE Watson CNP  12/19/22 [facility],[address], APRN - CNP  12/19/22 9721

## 2022-12-21 ENCOUNTER — HOSPITAL ENCOUNTER (INPATIENT)
Age: 87
LOS: 2 days | Discharge: HOME | DRG: 202 | End: 2022-12-25
Attending: EMERGENCY MEDICINE | Admitting: INTERNAL MEDICINE
Payer: MEDICARE

## 2022-12-21 ENCOUNTER — APPOINTMENT (OUTPATIENT)
Dept: CT IMAGING | Age: 87
DRG: 202 | End: 2022-12-21
Payer: MEDICARE

## 2022-12-21 DIAGNOSIS — R04.2 HEMOPTYSIS: Primary | ICD-10-CM

## 2022-12-21 DIAGNOSIS — J20.8 ACUTE BACTERIAL BRONCHITIS: ICD-10-CM

## 2022-12-21 DIAGNOSIS — B96.89 ACUTE BACTERIAL BRONCHITIS: ICD-10-CM

## 2022-12-21 LAB
ABO/RH: NORMAL
ALBUMIN SERPL-MCNC: 4 G/DL (ref 3.5–5.2)
ALP BLD-CCNC: 101 U/L (ref 40–129)
ALT SERPL-CCNC: 10 U/L (ref 0–40)
ANION GAP SERPL CALCULATED.3IONS-SCNC: 12 MMOL/L (ref 7–16)
ANTIBODY SCREEN: NORMAL
AST SERPL-CCNC: 27 U/L (ref 0–39)
BASOPHILS ABSOLUTE: 0.02 E9/L (ref 0–0.2)
BASOPHILS RELATIVE PERCENT: 0.3 % (ref 0–2)
BILIRUB SERPL-MCNC: 3.2 MG/DL (ref 0–1.2)
BUN BLDV-MCNC: 22 MG/DL (ref 6–23)
CALCIUM SERPL-MCNC: 9.1 MG/DL (ref 8.6–10.2)
CHLORIDE BLD-SCNC: 96 MMOL/L (ref 98–107)
CO2: 26 MMOL/L (ref 22–29)
CREAT SERPL-MCNC: 1 MG/DL (ref 0.7–1.2)
EOSINOPHILS ABSOLUTE: 0.05 E9/L (ref 0.05–0.5)
EOSINOPHILS RELATIVE PERCENT: 0.7 % (ref 0–6)
GFR SERPL CREATININE-BSD FRML MDRD: >60 ML/MIN/1.73
GLUCOSE BLD-MCNC: 102 MG/DL (ref 74–99)
HCT VFR BLD CALC: 39.9 % (ref 37–54)
HEMOGLOBIN: 12.9 G/DL (ref 12.5–16.5)
IMMATURE GRANULOCYTES #: 0.03 E9/L
IMMATURE GRANULOCYTES %: 0.4 % (ref 0–5)
INFLUENZA A BY PCR: NOT DETECTED
INFLUENZA B BY PCR: NOT DETECTED
INR BLD: 2.2
LYMPHOCYTES ABSOLUTE: 0.88 E9/L (ref 1.5–4)
LYMPHOCYTES RELATIVE PERCENT: 12.6 % (ref 20–42)
MCH RBC QN AUTO: 31.8 PG (ref 26–35)
MCHC RBC AUTO-ENTMCNC: 32.3 % (ref 32–34.5)
MCV RBC AUTO: 98.3 FL (ref 80–99.9)
MONOCYTES ABSOLUTE: 1.25 E9/L (ref 0.1–0.95)
MONOCYTES RELATIVE PERCENT: 17.9 % (ref 2–12)
NEUTROPHILS ABSOLUTE: 4.75 E9/L (ref 1.8–7.3)
NEUTROPHILS RELATIVE PERCENT: 68.1 % (ref 43–80)
PDW BLD-RTO: 14.5 FL (ref 11.5–15)
PLATELET # BLD: 134 E9/L (ref 130–450)
PMV BLD AUTO: 10.2 FL (ref 7–12)
POTASSIUM SERPL-SCNC: 3.8 MMOL/L (ref 3.5–5)
PROTHROMBIN TIME: 25.4 SEC (ref 9.3–12.4)
RBC # BLD: 4.06 E12/L (ref 3.8–5.8)
SARS-COV-2, NAAT: NOT DETECTED
SODIUM BLD-SCNC: 134 MMOL/L (ref 132–146)
TOTAL PROTEIN: 7.5 G/DL (ref 6.4–8.3)
TROPONIN, HIGH SENSITIVITY: 24 NG/L (ref 0–11)
TROPONIN, HIGH SENSITIVITY: 26 NG/L (ref 0–11)
WBC # BLD: 7 E9/L (ref 4.5–11.5)

## 2022-12-21 PROCEDURE — 86850 RBC ANTIBODY SCREEN: CPT

## 2022-12-21 PROCEDURE — 86901 BLOOD TYPING SEROLOGIC RH(D): CPT

## 2022-12-21 PROCEDURE — 85610 PROTHROMBIN TIME: CPT

## 2022-12-21 PROCEDURE — G0378 HOSPITAL OBSERVATION PER HR: HCPCS

## 2022-12-21 PROCEDURE — 6360000004 HC RX CONTRAST MEDICATION: Performed by: RADIOLOGY

## 2022-12-21 PROCEDURE — 84484 ASSAY OF TROPONIN QUANT: CPT

## 2022-12-21 PROCEDURE — 87502 INFLUENZA DNA AMP PROBE: CPT

## 2022-12-21 PROCEDURE — 99285 EMERGENCY DEPT VISIT HI MDM: CPT

## 2022-12-21 PROCEDURE — 87635 SARS-COV-2 COVID-19 AMP PRB: CPT

## 2022-12-21 PROCEDURE — 83880 ASSAY OF NATRIURETIC PEPTIDE: CPT

## 2022-12-21 PROCEDURE — 86900 BLOOD TYPING SEROLOGIC ABO: CPT

## 2022-12-21 PROCEDURE — 85025 COMPLETE CBC W/AUTO DIFF WBC: CPT

## 2022-12-21 PROCEDURE — 93005 ELECTROCARDIOGRAM TRACING: CPT

## 2022-12-21 PROCEDURE — 36415 COLL VENOUS BLD VENIPUNCTURE: CPT

## 2022-12-21 PROCEDURE — 80053 COMPREHEN METABOLIC PANEL: CPT

## 2022-12-21 PROCEDURE — 71275 CT ANGIOGRAPHY CHEST: CPT

## 2022-12-21 RX ADMIN — IOPAMIDOL 75 ML: 755 INJECTION, SOLUTION INTRAVENOUS at 20:37

## 2022-12-21 ASSESSMENT — ENCOUNTER SYMPTOMS
CHOKING: 0
CONSTIPATION: 0
EYE PAIN: 0
FACIAL SWELLING: 0
SORE THROAT: 0
VOMITING: 0
SHORTNESS OF BREATH: 0
ABDOMINAL PAIN: 0
COUGH: 1
BACK PAIN: 0
NAUSEA: 0
DIARRHEA: 0
PHOTOPHOBIA: 0

## 2022-12-21 NOTE — ED PROVIDER NOTES
[facility]  Department of Emergency Medicine     Written by: Wade Jimenez MD  Patient Name: Milena Escobedo  Attending Provider: Leif Marin MD  Admit Date: 2022  5:45 PM  MRN: [id number]                   : 1930        Chief Complaint   Patient presents with    Hemoptysis    - Chief complaint    70-year-old male with history of A. fib on warfarin, hyperlipidemia, hypertension, prostate cancer presents to the ED with complaints of hemoptysis, severe cough, and concerns by daughter of hypoxia and tachypnea. She states that 3 days ago, the patient had a \"funny feeling\" in his throat. This progressed to severe throat congestion with mucus 2 days ago, and is progressively worsening, until last night where he could barely speak according to her. She states that he was also slightly confused, and was not sure about directions within the house, going to the bathroom. She states that today, he had brown mucus, which progressed later to red mucus. When she asked him to cough and not can, she saw bright red color mixed into the mucus. The patient is a retired radiology physician. The daughter provides most of the history. The daughter states that symptoms were gradual onset, progressively worsening nature, nothing makes it better or worse, quality of symptoms is a cough with hemoptysis, radiation is localized to the upper respiratory tract, severity going to the daughter is severe, timing is constant. Review of Systems   Constitutional:  Negative for appetite change, chills, diaphoresis and fever. HENT:  Negative for ear discharge, ear pain, facial swelling, sneezing and sore throat. Eyes:  Negative for photophobia and pain. Respiratory:  Positive for cough. Negative for choking and shortness of breath. Hemoptysis   Cardiovascular:  Negative for chest pain and palpitations.    Gastrointestinal:  Negative for abdominal pain, constipation, diarrhea, nausea and vomiting. Genitourinary:  Negative for dysuria, enuresis, flank pain, frequency and hematuria. Musculoskeletal:  Negative for arthralgias, back pain, joint swelling, myalgias and neck pain. Skin:  Negative for pallor and rash. Neurological:  Negative for weakness, light-headedness and headaches. Physical Exam  Constitutional:       General: He is not in acute distress. Appearance: Normal appearance. He is not ill-appearing. HENT:      Head: Normocephalic and atraumatic. Nose: Nose normal. No congestion. Mouth/Throat:      Mouth: Mucous membranes are moist.      Pharynx: No posterior oropharyngeal erythema. Eyes:      General: No scleral icterus. Extraocular Movements: Extraocular movements intact. Pupils: Pupils are equal, round, and reactive to light. Cardiovascular:      Rate and Rhythm: Normal rate and regular rhythm. Pulses: Normal pulses. Heart sounds: Normal heart sounds. Pulmonary:      Effort: Pulmonary effort is normal. No respiratory distress. Breath sounds: No stridor. Wheezing, rhonchi and rales present. Comments: Hemoptysis in sample cup in room  Chest:      Chest wall: No tenderness. Abdominal:      General: Bowel sounds are normal. There is no distension. Palpations: Abdomen is soft. There is no mass. Tenderness: There is no abdominal tenderness. Musculoskeletal:         General: No swelling, tenderness or deformity. Normal range of motion. Cervical back: Normal range of motion. No rigidity or tenderness. Skin:     Capillary Refill: Capillary refill takes less than 2 seconds. Coloration: Skin is not jaundiced or pale. Findings: No erythema. Neurological:      Mental Status: He is alert. Procedures       MDM  Number of Diagnoses or Management Options  Hemoptysis  Diagnosis management comments:  This is an 12-year-old male with history of A. fib on warfarin, hyperlipidemia, hypertension, prostate cancer presents to the ED with complaints of hemoptysis, severe cough, and concerns by daughter of hypoxia and tachypnea. She states that 3 days ago, the patient had a \"funny feeling\" in his throat. This progressed to severe throat congestion with mucus 2 days ago, and is progressively worsening, until last night where he could barely speak according to her. She states that he was also slightly confused, and was not sure about directions within the house, going to the bathroom. She states that today, he had brown mucus, which progressed later to red mucus. When she asked him to cough and not can, she saw bright red color mixed into the mucus. The patient is a retired radiology physician. The daughter provides most of the history. The patient here in the ED is hemodynamically stable, and in no acute distress. They are calm, alert, oriented, and pleasant to speak with. The patient has significant findings of rales/rhonchi in the lungs bilaterally, and possibly some wheezes in the upper lobes bilaterally, no abnormal heart murmurs are heard. The patient has no abdominal pain or tenderness. The patient has good pulses and capillary refill bilaterally in both upper and lower extremities. The patient has no obvious rashes around the body. There are also no signs of jaundice or scleral icterus. No erythema or drainage noted from the ears bilaterally. The patient is neurovascularly intact, with full cranial nerve function, and no motor or sensory deficits throughout the body. There is no eye discharge or extremity swellings. Due to patient hemoptysis, history of cancer, concerns for hypoxia and tachypnea at home, patient has to have pulm embolism ruled out. Blood work including CBC and CMP and troponin to rule out infections or ACS, COVID influenza General viral illness is causing symptoms. Due to the patient being on warfarin, pro time-INR ordered.   Patient initial troponin is 26, however repeat is 24. Patient CBC and CMP is negative for any acute inflammatory infectious process or electrolyte normalities. The patient's viral swabs are negative. CTA of the chest with contrast reveals no pulm embolism, however there is pulmonary edema with pleural effusions bilaterally. Due to patient's hemoptysis and on warfarin, spoke to hospitalist, recommend holding warfarin and will be observed overnight to see how hemoptysis improves. Patient continuously coughing up blood here in the ED and is coughing up in a cup. It is seen by myself and my attending, and appears bright red clots within mucous. They may consider further evaluation later. Patient remains hemodynamically stable, and is in no acute distress. Patient is ready for admission. Amount and/or Complexity of Data Reviewed  Clinical lab tests: reviewed  Tests in the radiology section of CPT®: reviewed  Decide to obtain previous medical records or to obtain history from someone other than the patient: yes            --------------------------------------------- PAST HISTORY ---------------------------------------------  Past Medical History:  has a past medical history of Atrial fibrillation ([address] Cancer ([address] Hyperlipidemia, Hypertension, and Syncope and collapse. Past Surgical History:  has a past surgical history that includes Prostatectomy; pacemaker placement (Left, 2-8-2013); Cardioversion (2/22/2010); Cardioversion (8/11/2011); Cataract removal with implant; eye surgery; Prostatectomy (02/01/1988); Colonoscopy (09/19/2007); doppler echocardiography (08/29/2019); and hip surgery (Left, 3/21/2021). Social History:  reports that he has quit smoking. He has never used smokeless tobacco. He reports current alcohol use. He reports that he does not use drugs. Family History: family history includes Heart Surgery in an other family member. The patients home medications have been reviewed.     Allergies: Patient has no known allergies.     -------------------------------------------------- RESULTS -------------------------------------------------    LABS:  Results for orders placed or performed during the hospital encounter of 12/21/22   COVID-19, Rapid    Specimen: Nasopharyngeal Swab   Result Value Ref Range    SARS-CoV-2, NAAT Not Detected Not Detected   RAPID INFLUENZA A/B ANTIGENS    Specimen: Nasopharyngeal   Result Value Ref Range    Influenza A by PCR Not Detected Not Detected    Influenza B by PCR Not Detected Not Detected   CBC with Auto Differential   Result Value Ref Range    WBC 7.0 4.5 - 11.5 E9/L    RBC 4.06 3.80 - 5.80 E12/L    Hemoglobin 12.9 12.5 - 16.5 g/dL    Hematocrit 39.9 37.0 - 54.0 %    MCV 98.3 80.0 - 99.9 fL    MCH 31.8 26.0 - 35.0 pg    MCHC 32.3 32.0 - 34.5 %    RDW 14.5 11.5 - 15.0 fL    Platelets 122 103 - 790 E9/L    MPV 10.2 7.0 - 12.0 fL    Neutrophils % 68.1 43.0 - 80.0 %    Immature Granulocytes % 0.4 0.0 - 5.0 %    Lymphocytes % 12.6 (L) 20.0 - 42.0 %    Monocytes % 17.9 (H) 2.0 - 12.0 %    Eosinophils % 0.7 0.0 - 6.0 %    Basophils % 0.3 0.0 - 2.0 %    Neutrophils Absolute 4.75 1.80 - 7.30 E9/L    Immature Granulocytes # 0.03 E9/L    Lymphocytes Absolute 0.88 (L) 1.50 - 4.00 E9/L    Monocytes Absolute 1.25 (H) 0.10 - 0.95 E9/L    Eosinophils Absolute 0.05 0.05 - 0.50 E9/L    Basophils Absolute 0.02 0.00 - 0.20 E9/L   CMP   Result Value Ref Range    Sodium 134 132 - 146 mmol/L    Potassium 3.8 3.5 - 5.0 mmol/L    Chloride 96 (L) 98 - 107 mmol/L    CO2 26 22 - 29 mmol/L    Anion Gap 12 7 - 16 mmol/L    Glucose 102 (H) 74 - 99 mg/dL    BUN 22 6 - 23 mg/dL    Creatinine 1.0 0.7 - 1.2 mg/dL    Est, Glom Filt Rate >60 >=60 mL/min/1.73    Calcium 9.1 8.6 - 10.2 mg/dL    Total Protein 7.5 6.4 - 8.3 g/dL    Albumin 4.0 3.5 - 5.2 g/dL    Total Bilirubin 3.2 (H) 0.0 - 1.2 mg/dL    Alkaline Phosphatase 101 40 - 129 U/L    ALT 10 0 - 40 U/L    AST 27 0 - 39 U/L   Protime-INR   Result Value Ref Range    Protime 25. 4 (H) 9.3 - 12.4 sec    INR 2.2    Troponin   Result Value Ref Range    Troponin, High Sensitivity 26 (H) 0 - 11 ng/L   Troponin   Result Value Ref Range    Troponin, High Sensitivity 24 (H) 0 - 11 ng/L   TYPE AND SCREEN   Result Value Ref Range    ABO/Rh A POS     Antibody Screen NEG        RADIOLOGY:  CTA PULMONARY W CONTRAST   Final Result   No evidence of pulmonary embolism. Mild pulmonary edema. Small right greater than left pleural effusions. Medications   guaiFENesin-dextromethorphan (ROBITUSSIN DM) 100-10 MG/5ML syrup 10 mL (has no administration in time range)   sodium chloride flush 0.9 % injection 5-40 mL (has no administration in time range)   sodium chloride flush 0.9 % injection 5-40 mL (has no administration in time range)   0.9 % sodium chloride infusion (has no administration in time range)   ondansetron (ZOFRAN-ODT) disintegrating tablet 4 mg (has no administration in time range)     Or   ondansetron (ZOFRAN) injection 4 mg (has no administration in time range)   polyethylene glycol (GLYCOLAX) packet 17 g (has no administration in time range)   acetaminophen (TYLENOL) tablet 650 mg (has no administration in time range)     Or   acetaminophen (TYLENOL) suppository 650 mg (has no administration in time range)   furosemide (LASIX) injection 40 mg (has no administration in time range)   perflutren lipid microspheres (DEFINITY) injection 1.5 mL (has no administration in time range)   albuterol (PROVENTIL) nebulizer solution 2.5 mg (has no administration in time range)   iopamidol (ISOVUE-370) 76 % injection 75 mL (75 mLs IntraVENous Given 12/21/22 2037)       EKG: This EKG is signed and interpreted by me.     Rate: 70  Rhythm: Atrial fibrillation  Interpretation: atrial fibrillation (chronic), normal QRS ration, no QT prolongation, no acute ST change  Comparison: stable as compared to patient's most recent EKG      ------------------------- NURSING NOTES AND VITALS REVIEWED ---------------------------  Date / Time Roomed:  12/21/2022  5:45 PM  ED Bed Assignment:  [id number]    The nursing notes within the ED encounter and vital signs as below have been reviewed. Patient Vitals for the past 24 hrs:   BP Temp Temp src Pulse Resp SpO2 Height Weight   12/22/22 0145 (!) 155/93 98.3 °F (36.8 °C) Oral 79 20 94 % -- --   12/22/22 0100 -- -- -- 75 -- 97 % -- --   12/22/22 0030 (!) 177/82 -- -- 75 -- 92 % -- --   12/22/22 0000 (!) 188/77 -- -- 73 -- 96 % -- --   12/21/22 2330 (!) 181/84 -- -- 74 -- 95 % -- --   12/21/22 2300 -- -- -- 73 -- 91 % -- --   12/21/22 2000 (!) 172/83 -- -- 78 24 95 % -- --   12/21/22 1954 -- -- -- 69 22 -- -- --   12/21/22 1930 (!) 164/82 -- -- 72 20 97 % -- --   12/21/22 1900 (!) 174/80 -- -- 69 -- 93 % -- --   12/21/22 1830 (!) 177/76 -- -- 76 -- 90 % -- --   12/21/22 1811 (!) 180/75 97.5 °F (36.4 °C) -- 71 20 95 % 5' 6\" (1.676 m) 140 lb (63.5 kg)       Oxygen Saturation Interpretation: Normal    ------------------------------------------ PROGRESS NOTES ------------------------------------------  Re-evaluation(s):  Time: Every 30 minutes. Patients symptoms show no change  Repeat physical examination is not changed    Counseling:  I have spoken with the patient and discussed todays results, in addition to providing specific details for the plan of care and counseling regarding the diagnosis and prognosis. Their questions are answered at this time and they are agreeable with the plan of admission.    --------------------------------- ADDITIONAL PROVIDER NOTES ---------------------------------  Consultations:  Spoke with hospitalist.  Discussed case. They will admit the patient.   This patient's ED course included: a personal history and physicial examination, re-evaluation prior to disposition, multiple bedside re-evaluations, IV medications, cardiac monitoring, and continuous pulse oximetry    This patient has remained hemodynamically stable during their ED course. Diagnosis:  1. Hemoptysis        Disposition:  Patient's disposition: Admit to telemetry  Patient's condition is stable. Patient was seen and evaluated by myself and my attending Danielle Astudillo MD. Assessment and Plan discussed with attending provider, please see attestation for final plan of care.      MD Suzanne Barfield MD  Resident  12/22/22 8921

## 2022-12-22 LAB — PRO-BNP: 1710 PG/ML (ref 0–450)

## 2022-12-22 PROCEDURE — 87206 SMEAR FLUORESCENT/ACID STAI: CPT

## 2022-12-22 PROCEDURE — 99226 PR SBSQ OBSERVATION CARE/DAY 35 MINUTES: CPT | Performed by: INTERNAL MEDICINE

## 2022-12-22 PROCEDURE — 6360000002 HC RX W HCPCS: Performed by: INTERNAL MEDICINE

## 2022-12-22 PROCEDURE — 94640 AIRWAY INHALATION TREATMENT: CPT

## 2022-12-22 PROCEDURE — G0378 HOSPITAL OBSERVATION PER HR: HCPCS

## 2022-12-22 PROCEDURE — 6370000000 HC RX 637 (ALT 250 FOR IP): Performed by: INTERNAL MEDICINE

## 2022-12-22 PROCEDURE — 97161 PT EVAL LOW COMPLEX 20 MIN: CPT

## 2022-12-22 PROCEDURE — 97110 THERAPEUTIC EXERCISES: CPT

## 2022-12-22 PROCEDURE — 93306 TTE W/DOPPLER COMPLETE: CPT

## 2022-12-22 PROCEDURE — 96365 THER/PROPH/DIAG IV INF INIT: CPT

## 2022-12-22 PROCEDURE — 87070 CULTURE OTHR SPECIMN AEROBIC: CPT

## 2022-12-22 PROCEDURE — 96375 TX/PRO/DX INJ NEW DRUG ADDON: CPT

## 2022-12-22 PROCEDURE — 2580000003 HC RX 258: Performed by: INTERNAL MEDICINE

## 2022-12-22 RX ORDER — ONDANSETRON 4 MG/1
4 TABLET, ORALLY DISINTEGRATING ORAL EVERY 8 HOURS PRN
Status: DISCONTINUED | OUTPATIENT
Start: 2022-12-22 | End: 2022-12-25 | Stop reason: HOSPADM

## 2022-12-22 RX ORDER — GUAIFENESIN/DEXTROMETHORPHAN 100-10MG/5
10 SYRUP ORAL 3 TIMES DAILY PRN
Status: DISCONTINUED | OUTPATIENT
Start: 2022-12-22 | End: 2022-12-25 | Stop reason: HOSPADM

## 2022-12-22 RX ORDER — POLYETHYLENE GLYCOL 3350 17 G/17G
17 POWDER, FOR SOLUTION ORAL DAILY PRN
Status: DISCONTINUED | OUTPATIENT
Start: 2022-12-22 | End: 2022-12-25 | Stop reason: HOSPADM

## 2022-12-22 RX ORDER — SODIUM CHLORIDE 0.9 % (FLUSH) 0.9 %
5-40 SYRINGE (ML) INJECTION EVERY 12 HOURS SCHEDULED
Status: DISCONTINUED | OUTPATIENT
Start: 2022-12-22 | End: 2022-12-25 | Stop reason: HOSPADM

## 2022-12-22 RX ORDER — SODIUM CHLORIDE 0.9 % (FLUSH) 0.9 %
5-40 SYRINGE (ML) INJECTION PRN
Status: DISCONTINUED | OUTPATIENT
Start: 2022-12-22 | End: 2022-12-25 | Stop reason: HOSPADM

## 2022-12-22 RX ORDER — ONDANSETRON 2 MG/ML
4 INJECTION INTRAMUSCULAR; INTRAVENOUS EVERY 6 HOURS PRN
Status: DISCONTINUED | OUTPATIENT
Start: 2022-12-22 | End: 2022-12-25 | Stop reason: HOSPADM

## 2022-12-22 RX ORDER — SODIUM CHLORIDE 9 MG/ML
INJECTION, SOLUTION INTRAVENOUS PRN
Status: DISCONTINUED | OUTPATIENT
Start: 2022-12-22 | End: 2022-12-25 | Stop reason: HOSPADM

## 2022-12-22 RX ORDER — ACETAMINOPHEN 325 MG/1
650 TABLET ORAL EVERY 6 HOURS PRN
Status: DISCONTINUED | OUTPATIENT
Start: 2022-12-22 | End: 2022-12-25 | Stop reason: HOSPADM

## 2022-12-22 RX ORDER — ACETAMINOPHEN 650 MG/1
650 SUPPOSITORY RECTAL EVERY 6 HOURS PRN
Status: DISCONTINUED | OUTPATIENT
Start: 2022-12-22 | End: 2022-12-25 | Stop reason: HOSPADM

## 2022-12-22 RX ORDER — ALBUTEROL SULFATE 2.5 MG/3ML
2.5 SOLUTION RESPIRATORY (INHALATION) 4 TIMES DAILY
Status: DISCONTINUED | OUTPATIENT
Start: 2022-12-22 | End: 2022-12-24

## 2022-12-22 RX ORDER — FUROSEMIDE 10 MG/ML
40 INJECTION INTRAMUSCULAR; INTRAVENOUS DAILY
Status: DISCONTINUED | OUTPATIENT
Start: 2022-12-22 | End: 2022-12-25 | Stop reason: HOSPADM

## 2022-12-22 RX ADMIN — ALBUTEROL SULFATE 2.5 MG: 2.5 SOLUTION RESPIRATORY (INHALATION) at 14:25

## 2022-12-22 RX ADMIN — AZITHROMYCIN MONOHYDRATE 250 MG: 500 INJECTION, POWDER, LYOPHILIZED, FOR SOLUTION INTRAVENOUS at 17:23

## 2022-12-22 RX ADMIN — FUROSEMIDE 40 MG: 10 INJECTION, SOLUTION INTRAMUSCULAR; INTRAVENOUS at 10:22

## 2022-12-22 RX ADMIN — SODIUM CHLORIDE, PRESERVATIVE FREE 10 ML: 5 INJECTION INTRAVENOUS at 17:19

## 2022-12-22 RX ADMIN — GUAIFENESIN SYRUP AND DEXTROMETHORPHAN 10 ML: 100; 10 SYRUP ORAL at 10:22

## 2022-12-22 RX ADMIN — WATER 1000 MG: 1 INJECTION INTRAMUSCULAR; INTRAVENOUS; SUBCUTANEOUS at 17:15

## 2022-12-22 RX ADMIN — Medication 10 ML: at 21:53

## 2022-12-22 RX ADMIN — ALBUTEROL SULFATE 2.5 MG: 2.5 SOLUTION RESPIRATORY (INHALATION) at 16:48

## 2022-12-22 RX ADMIN — Medication 10 ML: at 10:22

## 2022-12-22 NOTE — H&P
[facility] History and Physical    Perpetual assessment: 58-year-old male history of paraspinal atrial fibrillation probable proximal disease presents with hemoptysis. Assessment plan:    Hemoptysis  -Suspect secondary to episode of bronchitis along with Coumadin use  -Patient does have a remote history of smoking  -Family was concerned due to the amount of hemoptysis  -We will keep n.p.o.  -Consult pulmonology for further evaluation  -Continue to hold Coumadin  -CT imaging showed pulmonary edema but no mass    Pulmonary edema  -No previous history of congestive heart failure  -We will proceed to check echo  -Check BNP  -Start Lasix 40 IV daily and monitor    Paroxysmal atrial fibrillation  -Seems to be in normal sinus rhythm  -We will hold Coumadin for now  -We will have PT OT evaluate to see how his ambulation is  -If deemed a high fall risk could probably discontinue Coumadin    Possible Parkinson's disease  -We will defer this to continue outpatient PCP evaluation    Early dementia  -No sign of any acute behavioral disturbances  -Continue to defer to outpatient PCP for further neuropsychological work-up    Code Status: Full  DVT prophylaxis: SCD      CHIEF COMPLAINT: Hemoptysis    History of Present Illness: This is a pleasant 58-year-old male who presents to [facility] with the above-stated complaint. Patient had presented from a cough for the past few days. He has been on amoxicillin for this. Subsequently began to develop hemoptysis. It was clot-like in nature. He would at times pulled size. Because this he was brought to ED by his daughter. She happens to be a pharmacist.  CT scan of chest was done did not show any signs of a mass but did show pulmonary edema. Patient was also has a remote history of smoking. Due to the new onset hemoptysis patient was admitted for further evaluation treatment.   I suspect this is just secondary from inflammation caused by the bronchitis use of Coumadin. Nonetheless we will ask pulmonology to see per request of the family. I will hold his Coumadin for now. I will have PT OT jayshree to see whether or not he is safe on his feet for ambulation. If he is a high fall risk at 80years old his Coumadin could be discontinued on discharge. Informant(s) for H&P:    REVIEW OF SYSTEMS:  A comprehensive review of systems was negative except for: what is in the HPI      PMH:  Past Medical History:   Diagnosis Date    Atrial fibrillation ([address]     Cancer ([address]     prostate     Hyperlipidemia     Hypertension     Syncope and collapse        Surgical History:  Past Surgical History:   Procedure Laterality Date    CARDIOVERSION  2/22/2010    Successful to NSR at [facility]. CARDIOVERSION  8/11/2011    Successful to NSR at [facility]. CATARACT REMOVAL WITH IMPLANT      COLONOSCOPY  09/19/2007    [facility]UNDANT COLON- [facility]    DOPPLER ECHOCARDIOGRAPHY  08/29/2019    Dr Bin Cabrera abnormal left ventricular systolic function--mod severe tricuspid regurg w/mild pulmonary hyn--mild mitral, aortic, pulmonic regurg. EYE SURGERY      CATARACT    HIP SURGERY Left 3/21/2021    LEFT HIP HEMIARTHROPLASTY [facility]) performed by Pamela Weber MD at [facility] Left 2-8-2013    Dual chamber-Dr. Yee-[employer]    PROSTATECTOMY      20 years ago    PROSTATECTOMY  02/01/1988    RETROPUBIC RADICAL WITH/WO NERVE SPARING       Medications Prior to Admission:    Prior to Admission medications    Medication Sig Start Date End Date Taking?  Authorizing Provider   guaiFENesin-dextromethorphan (ROBITUSSIN DM) 100-10 MG/5ML syrup Take 10 mLs by mouth 3 times daily as needed for Cough 12/19/22 12/29/22  CAPRICE Adames CNP   amoxicillin (AMOXIL) 250 MG/5ML suspension Take 10 mLs by mouth 3 times daily for 10 days 12/19/22 12/29/22  CAPRICE Adames CNP   warfarin (COUMADIN) 5 MG tablet Take 1 tablet by mouth daily Take 5 mg by mouth 11/23/21   Avel Evangelista MD   warfarin (COUMADIN) 6 MG tablet Take 1 tablet by mouth daily 9/9/21   Avel Evangelista MD       Allergies:    Patient has no known allergies. Social History:    reports that he has quit smoking. He has never used smokeless tobacco. He reports current alcohol use. He reports that he does not use drugs. Family History:   family history includes Heart Surgery in an other family member. PHYSICAL EXAM:  Vitals:  BP (!) 155/93   Pulse 79   Temp 98.3 °F (36.8 °C) (Oral)   Resp 20   Ht 5' 6\" (1.676 m)   Wt 140 lb (63.5 kg)   SpO2 94%   BMI 22.60 kg/m²     General Appearance: alert and oriented to person, place and time and in no acute distress  Skin: warm and dry  Head: normocephalic and atraumatic  Eyes: pupils equal, round, and reactive to light, extraocular eye movements intact, conjunctivae normal  Neck: neck supple and non tender without mass   Pulmonary/Chest: Rhonchi heard throughout lung fields with bibasilar crackles. No wheeze. Lung expands equally  Cardiovascular: normal rate, normal S1 and S2 and no carotid bruits  Abdomen: soft, non-tender, non-distended, normal bowel sounds, no masses or organomegaly  Extremities: no cyanosis, no clubbing and no edema  Neurologic: no cranial nerve deficit and speech normal        LABS:  Recent Labs     12/21/22 1937      K 3.8   CL 96*   CO2 26   BUN 22   CREATININE 1.0   GLUCOSE 102*   CALCIUM 9.1       Recent Labs     12/21/22 1937   WBC 7.0   RBC 4.06   HGB 12.9   HCT 39.9   MCV 98.3   MCH 31.8   MCHC 32.3   RDW 14.5      MPV 10.2       No results for input(s): POCGLU in the last 72 hours. Radiology:   CTA PULMONARY W CONTRAST   Final Result   No evidence of pulmonary embolism. Mild pulmonary edema. Small right greater than left pleural effusions. EKG:       NOTE: This report was transcribed using voice recognition software.  Every effort was made to ensure accuracy; however, inadvertent computerized transcription errors may be present.   Electronically signed by Sarah Lott MD on 12/22/2022 at 2:34 AM

## 2022-12-22 NOTE — CONSULTS
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 Streaks of bright red blood in it. The patient was admitted to the general medical floor and a CT scan was done prior to the admission which did not show any significant infiltrates according to the radiologist interpretation however, there may be a subtle infiltrate in the right lower lobe when I reviewed the film myself. Patient has never been a significant cigarette smoker except for a brief period 60 years ago. The CT did not reveal any evidence of pulmonary emboli or other lesions that would or could be accountable for the patient's hemoptysis. The patient takes warfarin for his chronic atrial fibrillation. His admission time was 25.4, INR 2.2.. This past June, his pro time was as high as 45.2 with an INR of 3.9. The patient's echocardiogram done in March 2021 shows significant evidence of pulmonary hypertension but LV ejection fraction was within normal limits. Patient gives no history of chills or diaphoresis. Influenza PCR was negative. He was positive for rhinovirus in 2019. The patient lives at home with his wife who has not been ill recently. ALLERGY:  Patient has no known allergies.     FAMILY HISTORY:  Family History   Problem Relation Age of Onset    Heart Surgery Other        SOCIAL HISTORY:  Social History     Socioeconomic History    Marital status:      Spouse name: Not on file    Number of children: Not on file    Years of education: Not on file    Highest education level: Not on file   Occupational History    Not on file   Tobacco Use    Smoking status: Former    Smokeless tobacco: Never Tobacco comments:     Quit 30 years ago. Substance and Sexual Activity    Alcohol use: Yes     Comment: social    Drug use: No    Sexual activity: Not Currently   Other Topics Concern    Not on file   Social History Narrative    Not on file     Social Determinants of Health     Financial Resource Strain: Not on file   Food Insecurity: Not on file   Transportation Needs: Not on file   Physical Activity: Not on file   Stress: Not on file   Social Connections: Not on file   Intimate Partner Violence: Not on file   Housing Stability: Not on file       MEDICAL HISTORY:  Past Medical History:   Diagnosis Date    Atrial fibrillation ([address]     Cancer ([address]     prostate     Hyperlipidemia     Hypertension     Syncope and collapse        MEDICATIONS:   sodium chloride flush  5-40 mL IntraVENous 2 times per day    furosemide  40 mg IntraVENous Daily    albuterol  2.5 mg Nebulization 4x daily      sodium chloride       guaiFENesin-dextromethorphan, sodium chloride flush, sodium chloride, ondansetron **OR** ondansetron, polyethylene glycol, acetaminophen **OR** acetaminophen, perflutren lipid microspheres    REVIEW OF SYSTEMS:  Constitutional: Denies fever, weight loss, night sweats, and fatigue  Skin: Denies pigmentation, dark lesions, and rashes   HEENT: Denies hearing loss, tinnitus, ear drainage, epistaxis, sore throat, and hoarseness. Cardiovascular: Denies palpitations, chest pain, and chest pressure. Respiratory: Denies cough, dyspnea at rest, positive for small amounts of negative for hemoptysis, apnea, and choking.   Gastrointestinal: Denies nausea, vomiting, poor appetite, diarrhea, heartburn or reflux  Genitourinary: Denies dysuria, frequency, urgency or hematuria  Musculoskeletal: Denies myalgias, muscle weakness, and bone pain  Neurological: Denies dizziness, vertigo, headache, and focal weakness  Psychological: Denies anxiety and depression  Endocrine: Denies heat intolerance and cold intolerance  Hematopoietic/Lymphatic: Denies bleeding problems and blood transfusions    PHYSICAL EXAM:  Vitals:    12/22/22 0530   BP: (!) 177/80   Pulse: 76   Resp: 20   Temp: 97.6 °F (36.4 °C)   SpO2: 96%           O2 Device: None (Room air)    Constitutional: No fever, chills, diaphoresis  Skin: No skin rash, no skin breakdown  HEENT: No JVD lymphadenopathy or thyromegaly  Neck: No JVD or lymphadenopathy  Cardiovascular: S1, S2 irregular. No S3 or rubs present  Respiratory: Clear except for few crackles at the right base  Gastrointestinal: Soft, flat, nontender  Genitourinary: No CVA tenderness  Extremities: No clubbing, cyanosis, or edema  Neurological: Awake, alert, oriented x3. No evidence of focal motor or sensory deficits  Psychological: In good spirits.   Patient is appropriate and affect is normal    LABS:  WBC   Date Value Ref Range Status   12/21/2022 7.0 4.5 - 11.5 E9/L Final   06/03/2022 7.3 4.5 - 11.5 E9/L Final   09/01/2021 5.0 4.5 - 11.5 E9/L Final     Hemoglobin   Date Value Ref Range Status   12/21/2022 12.9 12.5 - 16.5 g/dL Final   06/03/2022 14.4 12.5 - 16.5 g/dL Final   09/01/2021 12.6 12.5 - 16.5 g/dL Final     Hematocrit   Date Value Ref Range Status   12/21/2022 39.9 37.0 - 54.0 % Final   06/03/2022 44.1 37.0 - 54.0 % Final   09/01/2021 39.0 37.0 - 54.0 % Final     MCV   Date Value Ref Range Status   12/21/2022 98.3 80.0 - 99.9 fL Final   06/03/2022 100.2 (H) 80.0 - 99.9 fL Final   09/01/2021 90.5 80.0 - 99.9 fL Final     Platelets   Date Value Ref Range Status   12/21/2022 134 130 - 450 E9/L Final   06/03/2022 149 130 - 450 E9/L Final   09/01/2021 163 130 - 450 E9/L Final     Sodium   Date Value Ref Range Status   12/21/2022 134 132 - 146 mmol/L Final   06/03/2022 138 132 - 146 mmol/L Final   09/01/2021 139 132 - 146 mmol/L Final     Potassium   Date Value Ref Range Status   12/21/2022 3.8 3.5 - 5.0 mmol/L Final   06/03/2022 4.4 3.5 - 5.0 mmol/L Final   09/01/2021 4.2 3.5 - 5.0 mmol/L Final     Potassium reflex Magnesium   Date Value Ref Range Status   03/19/2021 3.9 3.5 - 5.0 mmol/L Final     Chloride   Date Value Ref Range Status   12/21/2022 96 (L) 98 - 107 mmol/L Final   06/03/2022 102 98 - 107 mmol/L Final   09/01/2021 103 98 - 107 mmol/L Final     CO2   Date Value Ref Range Status   12/21/2022 26 22 - 29 mmol/L Final   06/03/2022 25 22 - 29 mmol/L Final   09/01/2021 25 22 - 29 mmol/L Final     BUN   Date Value Ref Range Status   12/21/2022 22 6 - 23 mg/dL Final   06/03/2022 22 6 - 23 mg/dL Final   09/01/2021 19 6 - 23 mg/dL Final     Creatinine   Date Value Ref Range Status   12/21/2022 1.0 0.7 - 1.2 mg/dL Final   06/03/2022 0.8 0.7 - 1.2 mg/dL Final   09/01/2021 0.8 0.7 - 1.2 mg/dL Final     Glucose   Date Value Ref Range Status   12/21/2022 102 (H) 74 - 99 mg/dL Final   06/03/2022 109 (H) 74 - 99 mg/dL Final   09/01/2021 92 74 - 99 mg/dL Final   03/05/2012 115 (H) 70 - 110 mg/dL Final   08/09/2011 90 70 - 110 mg/dL Final     Calcium   Date Value Ref Range Status   12/21/2022 9.1 8.6 - 10.2 mg/dL Final   06/03/2022 9.0 8.6 - 10.2 mg/dL Final   09/01/2021 9.2 8.6 - 10.2 mg/dL Final     Total Protein   Date Value Ref Range Status   12/21/2022 7.5 6.4 - 8.3 g/dL Final   06/03/2022 7.4 6.4 - 8.3 g/dL Final   09/01/2021 7.1 6.4 - 8.3 g/dL Final     Albumin   Date Value Ref Range Status   12/21/2022 4.0 3.5 - 5.2 g/dL Final   06/03/2022 3.8 3.5 - 5.2 g/dL Final   09/01/2021 3.7 3.5 - 5.2 g/dL Final   03/05/2012 4.0 3.2 - 4.8 g/dL Final   08/09/2011 3.9 3.2 - 4.8 g/dL Final     Total Bilirubin   Date Value Ref Range Status   12/21/2022 3.2 (H) 0.0 - 1.2 mg/dL Final   06/03/2022 1.1 0.0 - 1.2 mg/dL Final   09/01/2021 1.1 0.0 - 1.2 mg/dL Final     Alkaline Phosphatase   Date Value Ref Range Status   12/21/2022 101 40 - 129 U/L Final   06/03/2022 73 40 - 129 U/L Final   09/01/2021 82 40 - 129 U/L Final     AST   Date Value Ref Range Status   12/21/2022 27 0 - 39 U/L Final   06/03/2022 21 0 - 39 U/L Final     Comment:     Specimen is slightly Hemolyzed. Result may be artificially increased. 09/01/2021 22 0 - 39 U/L Final     ALT   Date Value Ref Range Status   12/21/2022 10 0 - 40 U/L Final   06/03/2022 7 0 - 40 U/L Final   09/01/2021 <5 0 - 40 U/L Final     Est, Glom Filt Rate   Date Value Ref Range Status   12/21/2022 >60 >=60 mL/min/1.73 Final     Comment:     Pediatric calculator link  [email or web address] org/professionals/kdoqi/gfr_calculatorped  Effective Oct 3, 2022  These results are not intended for use in patients  <25years of age. eGFR results are calculated without  a race factor using the 2021 CKD-EPI equation. Careful  clinical correlation is recommended, particularly when  comparing to results calculated using previous equations. The CKD-EPI equation is less accurate in patients with  extremes of muscle mass, extra-renal metabolism of  creatinine, excessive creatinine ingestion, or following  therapy that affects renal tubular secretion. GFR Non-   Date Value Ref Range Status   06/03/2022 >60 >=60 mL/min/1.73 Final     Comment:     Chronic Kidney Disease: less than 60 ml/min/1.73 sq.m. Kidney Failure: less than 15 ml/min/1.73 sq.m. Results valid for patients 18 years and older. 09/01/2021 >60 >=60 mL/min/1.73 Final     Comment:     Chronic Kidney Disease: less than 60 ml/min/1.73 sq.m. Kidney Failure: less than 15 ml/min/1.73 sq.m. Results valid for patients 18 years and older. 04/04/2021 >60 >=60 mL/min/1.73 Final     Comment:     Chronic Kidney Disease: less than 60 ml/min/1.73 sq.m. Kidney Failure: less than 15 ml/min/1.73 sq.m. Results valid for patients 18 years and older.        GFR    Date Value Ref Range Status   06/03/2022 >60  Final   09/01/2021 >60  Final   04/04/2021 >60  Final     Magnesium   Date Value Ref Range Status   06/03/2022 2.1 1.6 - 2.6 mg/dL Final   03/05/2012 2.2 1.3 - 2.7 mg/dL Final     No results found for: PHOS  No results for input(s): PH, PO2, PCO2, HCO3, BE, O2SAT in the last 72 hours. RADIOLOGY:  CTA PULMONARY W CONTRAST   Final Result   No evidence of pulmonary embolism. Mild pulmonary edema. Small right greater than left pleural effusions. IMPRESSION:  Hemoptysis (minor), likely related to a respiratory tract infection such as a virus. However, there is a small but subtle infiltrate in the right lower lobe which could represent early pneumonia probably bacterial etiology. When this is considered in the context of an elevated INR in an elderly gentleman, anticoagulation will need to be held for a period of time and antibiotics will need to be initiated. PLAN:  Doxycycline and ceftriaxone  Okay to eat after bedside swallow by the nurse  Not planning on bronchoscopy for now but if the patient starts to cough up more blood, we will reevaluate.   Hold anticoagulants but no need to give vitamin K at this time    Electronically signed by Tequila Khan MD on 12/22/2022 at 3:21 PM

## 2022-12-22 NOTE — PLAN OF CARE
Problem: Discharge Planning  Goal: Discharge to home or other facility with appropriate resources  12/22/2022 1308 by Jasmin Granger RN  Outcome: Progressing     Problem: ABCDS Injury Assessment  Goal: Absence of physical injury  12/22/2022 1308 by Jasmin Granger RN  Outcome: Progressing  12/22/2022 0440 by Star Manning RN  Outcome: Progressing     Problem: Skin/Tissue Integrity  Goal: Absence of new skin breakdown  12/22/2022 1308 by Jasmin Granger RN  Outcome: Progressing     Problem: Safety - Adult  Goal: Free from fall injury  12/22/2022 1308 by Jasmin Granger RN  Outcome: Progressing

## 2022-12-22 NOTE — ED NOTES
IV established, labs drawn and sent. Swabs sent. EKG complete, copy given to Dr Vani Ferrer. Pt on cont tele and pulse ox.       Urmila Stevens RN  12/21/22 7631

## 2022-12-22 NOTE — PROGRESS NOTES
Physical Therapy Initial Evaluation/Plan of Care    Room #:  5566/7666-18  Patient Name: Konstantin Coleman  YOB: 1930  MRN: [id number]    Date of Service: 12/22/2022     Tentative placement recommendation: Subacute vs Home Health Physical Therapy if patient meets goals  Equipment recommendation: To be determined      Evaluating Physical Therapist: Sarah White, [address] [facility] #[id number]      Specific Provider Orders/Date/Referring Provider :   12/22/22 0230    PT evaluation and treat  Start:  12/22/22 0230,   End:  12/22/22 0230,   ONE TIME,   Standing Count:  1 Occurrences,   Marv Garcia MD     Admitting Diagnosis:   Hemoptysis [R04.2]      Surgery: none      Patient Active Problem List   Diagnosis    A-fib ([facility] [address]    Chronic anticoagulation    Hypertension    H/O prostate cancer    Orthostatic hypotension    Sinus bradycardia-tachycardia syndrome (HCC)    Pacemaker    Hyperlipidemia    Vitamin B12 deficiency    Vitamin D deficiency    Closed fracture of multiple pubic rami, right, initial encounter ([facility])    COPD (chronic obstructive pulmonary disease) ([facility])    Acute bronchitis    Acute respiratory failure with hypoxia ([facility])    Closed fracture of ramus of right pubis ([facility])    Back pain    Closed compression fracture of L1 lumbar vertebra    Sacral fracture, closed ([facility])    Left displaced femoral neck fracture ([facility])    Hemoptysis        ASSESSMENT of Current Deficits Patient exhibits decreased strength, balance, and endurance impairing functional mobility, transfers, gait distance, and tolerance to activity. Pt required min A to ambulate, poor safety awareness noted and decreased balance of support, cues for walker management required for safety. Patient reported he lost a lot of blood and is very fatigued and weak right now.  The patient will benefit from continued skilled therapy to increase strength and improve balance for safe functional mobility, to decrease risk of falls, and to meet goals at discharge. PHYSICAL THERAPY  PLAN OF CARE       Physical therapy plan of care is established based on physician order,  patient diagnosis and clinical assessment    Current Treatment Recommendations:    -Bed Mobility: Lower extremity exercises  and Upper extremity exercises   -Sitting Balance: Incorporate reaching activities to activate trunk muscles   -Standing Balance: Perform strengthening exercises in standing to promote motor control with or without upper extremity support   -Transfers: Provide instruction on proper hand and foot position for adequate transfer of weight onto lower extremities and use of gait device if needed and Cues for hand placement, technique and safety. Provide stabilization to prevent fall   -Gait: Gait training and Standing activities to improve: base of support, weight shift, weight bearing    -Endurance: Utilize Supervised activities to increase level of endurance to allow for safe functional mobility including transfers and gait   -Stairs: Stair training with instruction on proper technique and hand placement on rail    PT long term treatment goals are located in below grid    Patient and or family understand(s) diagnosis, prognosis, and plan of care. Frequency of treatments: Patient will be seen  daily. Prior Level of Function: Patient ambulated with wheeled walker   Rehab Potential: good  for baseline    Past medical history:   Past Medical History:   Diagnosis Date    Atrial fibrillation ([address]     Cancer ([facility] [address]     prostate     Hyperlipidemia     Hypertension     Syncope and collapse      Past Surgical History:   Procedure Laterality Date    CARDIOVERSION  2/22/2010    Successful to NSR at [facility]. CARDIOVERSION  8/11/2011    Successful to NSR at [facility].     CATARACT REMOVAL WITH IMPLANT      COLONOSCOPY  09/19/2007    REDUNDANT COLON- [facility]    DOPPLER ECHOCARDIOGRAPHY  08/29/2019    Dr Dale Lunsford abnormal left ventricular systolic function--mod severe tricuspid regurg w/mild pulmonary hyn--mild mitral, aortic, pulmonic regurg. EYE SURGERY      CATARACT    HIP SURGERY Left 3/21/2021    LEFT HIP HEMIARTHROPLASTY [place] [facility] [place] [facility]) performed by Tim [facility]ilar MD at [facility] Left 2-8-2013    Dual chamber-Dr. [employer]ee[employer]    PROSTATECTOMY      20 years ago    PROSTATECTOMY  02/01/1988    RETROPUBIC RADICAL WITH/WO NERVE SPARING       SUBJECTIVE:    Precautions: Up with assistance, falls , decreased balance of support, poor safety awareness    Social history: Patient lives with spouse in a ranch home  with 3 steps  to enter Walk in shower        Equipment owned: [address],      [address] [place] [address]   How much difficulty turning over in bed?: A Little  How much difficulty sitting down on / standing up from a chair with arms?: A Little  How much difficulty moving from lying on back to sitting on side of bed?: A Little  How much help from another person moving to and from a bed to a chair?: A Little  How much help from another person needed to walk in hospital room?: A Lot  How much help from another person for climbing 3-5 steps with a railing?: A Lot  AM-PAC Inpatient Mobility Raw Score : 16  AM-PAC Inpatient T-Scale Score : 40.78  Mobility Inpatient CMS 0-100% Score: 54.16  Mobility Inpatient CMS G-Code Modifier : CK    Nursing cleared patient for PT evaluation. The admitting diagnosis and active problem list as listed above have been reviewed prior to the initiation of this evaluation. OBJECTIVE;   Initial Evaluation  Date: 12/22/2022 Treatment Date:     Short Term/ Long Term   Goals   Was pt agreeable to Eval/treatment? Yes  To be met in 5 days   Pain level   0/10       Bed Mobility    Rolling: Minimal assist of 1    Supine to sit: Minimal assist of 1    Sit to supine: Minimal assist of 1    Scooting: Minimal assist of 1    Rolling: Independent    Supine to sit:  Independent    Sit to supine: Independent Scooting: Independent     Transfers Sit to stand: Minimal assist of 1   Sit to stand: Independent    Ambulation     2x25 feet using  wheeled walker with Min/Mod assist   for walker control, balance, upright, and safety, Patient with decreased en, decreased base of support, and decreased step length, and cues for sequencing, upright posture, walker approximation, safety, and obstacle negotiation    50 feet using  wheeled walker with Modified Independent    Stair negotiation: ascended and descended   Not assessed       3 steps with Hr and supervision    ROM Within functional limits    Increase range of motion 10% of affected joints    Strength BUE:  refer to OT eval  RLE:  4-/5  LLE:  4-/5  Increase strength in affected mm groups by 1/3 grade   Balance Sitting EOB:  good -  Dynamic Standing:  fair - wheeled walker    Sitting EOB:  good   Dynamic Standing: good      Patient is Alert & Oriented x person, place, time, and situation and follows directions    Sensation:  Patient  denies numbness/tingling   Edema:  no   Endurance: fair      Vitals: room air   Blood Pressure at rest  Blood Pressure during session    Heart Rate at rest  Heart Rate during session    SPO2 at rest 96%  SPO2 during session %     Patient education  Patient educated on role of Physical Therapy, risks of immobility, safety and plan of care,  importance of mobility while in hospital , ankle pumps, quad set and glut set for edema control, blood clot prevention, importance and purpose of adaptive device and adjusted to proper height for the patient. , and safety      Patient response to education:   Pt verbalized understanding Pt demonstrated skill Pt requires further education in this area   Yes Partial Yes      Treatment:  Patient practiced and was instructed/facilitated in the following treatment: Patient assisted with use of urinal. Pt assisted to EOB.  Sat edge of bed 10 minutes with Supervision  to increase dynamic sitting balance and activity tolerance. Pt stood and ambulated to door and back x2 reps. Fatigued with increased distance and during second rep, required mod A. Pt assisted back to supine. Therapeutic Exercises:  ankle pumps, quad sets, glut sets, heel slide, and straight leg raise  x 10 reps. Cues for technique and pace       At end of session, patient in bed with alarm call light and phone within reach,  all lines and tubes intact, nursing notified. Patient would benefit from continued skilled Physical Therapy to improve functional independence and quality of life. Patient's/ family goals   home    Time in  1340  Time out  1410    Total Treatment Time  10 minutes    Evaluation time includes thorough review of current medical information, gathering information on past medical history/social history and prior level of function, completion of standardized testing/informal observation of tasks, assessment of data, and development of Plan of care and goals.      CPT codes:  Low Complexity PT evaluation (78445)  Therapeutic exercises (53422)   10 minutes  1 unit(s)    Shaji Person PT

## 2022-12-23 ENCOUNTER — APPOINTMENT (OUTPATIENT)
Dept: GENERAL RADIOLOGY | Age: 87
DRG: 202 | End: 2022-12-23
Payer: MEDICARE

## 2022-12-23 PROBLEM — B96.89 ACUTE BACTERIAL BRONCHITIS: Status: ACTIVE | Noted: 2022-12-23

## 2022-12-23 PROBLEM — J20.8 ACUTE BACTERIAL BRONCHITIS: Status: ACTIVE | Noted: 2022-12-23

## 2022-12-23 LAB
ALBUMIN SERPL-MCNC: 3.5 G/DL (ref 3.5–5.2)
ALP BLD-CCNC: 90 U/L (ref 40–129)
ALT SERPL-CCNC: 10 U/L (ref 0–40)
ANION GAP SERPL CALCULATED.3IONS-SCNC: 13 MMOL/L (ref 7–16)
AST SERPL-CCNC: 28 U/L (ref 0–39)
BASOPHILS ABSOLUTE: 0.01 E9/L (ref 0–0.2)
BASOPHILS RELATIVE PERCENT: 0.2 % (ref 0–2)
BILIRUB SERPL-MCNC: 2.4 MG/DL (ref 0–1.2)
BUN BLDV-MCNC: 25 MG/DL (ref 6–23)
CALCIUM SERPL-MCNC: 8.8 MG/DL (ref 8.6–10.2)
CHLORIDE BLD-SCNC: 101 MMOL/L (ref 98–107)
CO2: 28 MMOL/L (ref 22–29)
CREAT SERPL-MCNC: 1 MG/DL (ref 0.7–1.2)
EKG ATRIAL RATE: 92 BPM
EKG Q-T INTERVAL: 410 MS
EKG QRS DURATION: 88 MS
EKG QTC CALCULATION (BAZETT): 442 MS
EKG R AXIS: 35 DEGREES
EKG T AXIS: 23 DEGREES
EKG VENTRICULAR RATE: 70 BPM
EOSINOPHILS ABSOLUTE: 0.16 E9/L (ref 0.05–0.5)
EOSINOPHILS RELATIVE PERCENT: 2.9 % (ref 0–6)
GFR SERPL CREATININE-BSD FRML MDRD: >60 ML/MIN/1.73
GLUCOSE BLD-MCNC: 86 MG/DL (ref 74–99)
HCT VFR BLD CALC: 36.8 % (ref 37–54)
HEMOGLOBIN: 12.5 G/DL (ref 12.5–16.5)
IMMATURE GRANULOCYTES #: 0.01 E9/L
IMMATURE GRANULOCYTES %: 0.2 % (ref 0–5)
INR BLD: 2.3
LYMPHOCYTES ABSOLUTE: 0.95 E9/L (ref 1.5–4)
LYMPHOCYTES RELATIVE PERCENT: 17.2 % (ref 20–42)
MAGNESIUM: 2.1 MG/DL (ref 1.6–2.6)
MCH RBC QN AUTO: 33.1 PG (ref 26–35)
MCHC RBC AUTO-ENTMCNC: 34 % (ref 32–34.5)
MCV RBC AUTO: 97.4 FL (ref 80–99.9)
MONOCYTES ABSOLUTE: 1.13 E9/L (ref 0.1–0.95)
MONOCYTES RELATIVE PERCENT: 20.4 % (ref 2–12)
NEUTROPHILS ABSOLUTE: 3.27 E9/L (ref 1.8–7.3)
NEUTROPHILS RELATIVE PERCENT: 59.1 % (ref 43–80)
PDW BLD-RTO: 14.2 FL (ref 11.5–15)
PHOSPHORUS: 2.8 MG/DL (ref 2.5–4.5)
PLATELET # BLD: 147 E9/L (ref 130–450)
PMV BLD AUTO: 10.7 FL (ref 7–12)
POTASSIUM SERPL-SCNC: 3.2 MMOL/L (ref 3.5–5)
PROTHROMBIN TIME: 26.4 SEC (ref 9.3–12.4)
RBC # BLD: 3.78 E12/L (ref 3.8–5.8)
SODIUM BLD-SCNC: 142 MMOL/L (ref 132–146)
TOTAL PROTEIN: 6.6 G/DL (ref 6.4–8.3)
WBC # BLD: 5.5 E9/L (ref 4.5–11.5)

## 2022-12-23 PROCEDURE — 6360000002 HC RX W HCPCS: Performed by: INTERNAL MEDICINE

## 2022-12-23 PROCEDURE — 2580000003 HC RX 258: Performed by: INTERNAL MEDICINE

## 2022-12-23 PROCEDURE — 80053 COMPREHEN METABOLIC PANEL: CPT

## 2022-12-23 PROCEDURE — 96376 TX/PRO/DX INJ SAME DRUG ADON: CPT

## 2022-12-23 PROCEDURE — 71045 X-RAY EXAM CHEST 1 VIEW: CPT

## 2022-12-23 PROCEDURE — 85025 COMPLETE CBC W/AUTO DIFF WBC: CPT

## 2022-12-23 PROCEDURE — 1200000000 HC SEMI PRIVATE

## 2022-12-23 PROCEDURE — 83735 ASSAY OF MAGNESIUM: CPT

## 2022-12-23 PROCEDURE — 84100 ASSAY OF PHOSPHORUS: CPT

## 2022-12-23 PROCEDURE — 36415 COLL VENOUS BLD VENIPUNCTURE: CPT

## 2022-12-23 PROCEDURE — 93010 ELECTROCARDIOGRAM REPORT: CPT | Performed by: INTERNAL MEDICINE

## 2022-12-23 PROCEDURE — 85610 PROTHROMBIN TIME: CPT

## 2022-12-23 PROCEDURE — 99233 SBSQ HOSP IP/OBS HIGH 50: CPT | Performed by: INTERNAL MEDICINE

## 2022-12-23 PROCEDURE — 6370000000 HC RX 637 (ALT 250 FOR IP): Performed by: INTERNAL MEDICINE

## 2022-12-23 PROCEDURE — 94640 AIRWAY INHALATION TREATMENT: CPT

## 2022-12-23 RX ORDER — POTASSIUM CHLORIDE 20 MEQ/1
40 TABLET, EXTENDED RELEASE ORAL ONCE
Status: COMPLETED | OUTPATIENT
Start: 2022-12-23 | End: 2022-12-23

## 2022-12-23 RX ADMIN — AZITHROMYCIN MONOHYDRATE 250 MG: 500 INJECTION, POWDER, LYOPHILIZED, FOR SOLUTION INTRAVENOUS at 16:26

## 2022-12-23 RX ADMIN — ALBUTEROL SULFATE 2.5 MG: 2.5 SOLUTION RESPIRATORY (INHALATION) at 17:17

## 2022-12-23 RX ADMIN — POTASSIUM CHLORIDE 40 MEQ: 1500 TABLET, EXTENDED RELEASE ORAL at 08:56

## 2022-12-23 RX ADMIN — ALBUTEROL SULFATE 2.5 MG: 2.5 SOLUTION RESPIRATORY (INHALATION) at 06:23

## 2022-12-23 RX ADMIN — ALBUTEROL SULFATE 2.5 MG: 2.5 SOLUTION RESPIRATORY (INHALATION) at 09:30

## 2022-12-23 RX ADMIN — WATER 1000 MG: 1 INJECTION INTRAMUSCULAR; INTRAVENOUS; SUBCUTANEOUS at 16:24

## 2022-12-23 RX ADMIN — ALBUTEROL SULFATE 2.5 MG: 2.5 SOLUTION RESPIRATORY (INHALATION) at 14:19

## 2022-12-23 RX ADMIN — Medication 10 ML: at 19:48

## 2022-12-23 RX ADMIN — Medication 10 ML: at 11:44

## 2022-12-23 NOTE — PROGRESS NOTES
Date:2022  Patient Name: Aura Ruvalcaba  MRN: [id number]  : 1930  ROOM #: 7311/6835-35    Occupational Therapy order received, chart reviewed and evaluation attempted this date. Patient is unavailable for OT evaluation due to patient refusal. Pt was seated in bedside chair and despite maximal encouragement pt asked this therapist to not bother him as he was not getting up. Will attempt OT evaluation at a later time. Thank you.    Bernie Temple OTR/L #[id number]

## 2022-12-23 NOTE — PROGRESS NOTES
[address] [place], [place] [address]ist   Progress Note    Admitting Date and Time: 12/21/2022  5:45 PM  Admit Dx: Hemoptysis [R04.2]  Acute bacterial bronchitis [J20.8, B96.89]    Subjective/interval history:    Pt awake, alert, sitting up in chair. Has not had any episodes of hemoptysis today. Breathing comfortably on room air. Started on ceftriaxone and azithromycin per pulmonology for probable bacterial bronchitis. No masses or nodules on CT. Likely etiology of hemoptysis is bronchitis while on anticoagulation with warfarin. No plans for bronchoscopy at this time per pulmonology. ROS: denies fever, chills, cp, sob, n/v, HA unless stated above.     sodium chloride flush  5-40 mL IntraVENous 2 times per day    [Held by provider] furosemide  40 mg IntraVENous Daily    albuterol  2.5 mg Nebulization 4x daily    azithromycin  250 mg IntraVENous Q24H    cefTRIAXone (ROCEPHIN) IV  1,000 mg IntraVENous Q24H     guaiFENesin-dextromethorphan, 10 mL, TID PRN  sodium chloride flush, 5-40 mL, PRN  sodium chloride, , PRN  ondansetron, 4 mg, Q8H PRN   Or  ondansetron, 4 mg, Q6H PRN  polyethylene glycol, 17 g, Daily PRN  acetaminophen, 650 mg, Q6H PRN   Or  acetaminophen, 650 mg, Q6H PRN  perflutren lipid microspheres, 1.5 mL, ONCE PRN  perflutren lipid microspheres, 1.5 mL, ONCE PRN       Objective:    BP (!) 148/94   Pulse 67   Temp 97.9 °F (36.6 °C) (Oral)   Resp 18   Ht 5' 6\" (1.676 m)   Wt 140 lb (63.5 kg)   SpO2 94%   BMI 22.60 kg/m²   General Appearance: Elderly male, awake, alert, sitting up in chair. Not in any distress  Skin: warm and dry  Head: normocephalic and atraumatic  Eyes: pupils equal, round, and reactive to light, extraocular eye movements intact, conjunctivae normal  Neck: neck supple and non tender without mass   Pulmonary/Chest: Nonlabored on room air.   Mild bilateral rhonchi  Cardiovascular: normal rate, normal S1 and S2 and no carotid bruits  Abdomen: soft, non-tender, non-distended, normal bowel sounds, no masses or organomegaly  Extremities: no cyanosis, no clubbing and no edema  Neurologic: no cranial nerve deficit and speech normal      Recent Labs     12/21/22 1937 12/23/22  0433    142   K 3.8 3.2*   CL 96* 101   CO2 26 28   BUN 22 25*   CREATININE 1.0 1.0   GLUCOSE 102* 86   CALCIUM 9.1 8.8       Recent Labs     12/21/22 1937 12/23/22 0433   ALKPHOS 101 90   PROT 7.5 6.6   LABALBU 4.0 3.5   BILITOT 3.2* 2.4*   AST 27 28   ALT 10 10       Recent Labs     12/21/22 1937 12/23/22  0433   WBC 7.0 5.5   RBC 4.06 3.78*   HGB 12.9 12.5   HCT 39.9 36.8*   MCV 98.3 97.4   MCH 31.8 33.1   MCHC 32.3 34.0   RDW 14.5 14.2    147   MPV 10.2 10.7         Radiology:   XR CHEST PORTABLE   Final Result   Right pleural effusion is no longer apparent. It has resolved or diminished. CTA PULMONARY W CONTRAST   Final Result   No evidence of pulmonary embolism. Mild pulmonary edema. Small right greater than left pleural effusions. Assessment and Plan:  Principal Problem:    Hemoptysis  Active Problems:    Acute bacterial bronchitis    A-fib (HCC)    Chronic anticoagulation  Resolved Problems:    * No resolved hospital problems. *      Hemoptysis most likely due to acute bacterial bronchitis on anticoagulation  -Started on ceftriaxone and azithromycin 12/22 per pulmonology  -Sputum culture in process  -No episodes today    2. Paroxysmal atrial fibrillation  -Hold warfarin for today, if no further hemoptysis we will reinitiate tomorrow  -Daily PT/INR for now    3. Possible Parkinson's disease  -Follow-up with primary care physician as an outpatient    4. Early dementia  -Supportive care  -Follow-up with primary care physician    DVT prophylaxis: INR in therapeutic range  Code status: Full code     Disposition: Anticipate 1-2 more days inpatient prior to transition to oral antibiotics and discharge.         NOTE: Portions of this report were transcribed using voice recognition software. Every effort was made to ensure accuracy; however, inadvertent computerized transcription errors may be present.        Electronically signed by Jasmina Rogers DO on 12/23/2022 at 5:22 PM

## 2022-12-23 NOTE — ACP (ADVANCE CARE PLANNING)
Advance Care Planning   Healthcare Decision Maker:    Primary Decision Maker: Kennedy Hernadez - Child - [phone]      Today we documented healthcare decision maker, pts daughter will be primary per legal next of kin and patient's spouse has dementia.

## 2022-12-23 NOTE — PLAN OF CARE
Problem: Discharge Planning  Goal: Discharge to home or other facility with appropriate resources  12/23/2022 0014 by Sunshine Coleman RN  Outcome: Progressing  12/22/2022 1308 by Weston Davis RN  Outcome: Progressing     Problem: ABCDS Injury Assessment  Goal: Absence of physical injury  12/23/2022 0014 by Sunshine Coleman RN  Outcome: Progressing  12/22/2022 1308 by Weston Davis RN  Outcome: Progressing     Problem: Skin/Tissue Integrity  Goal: Absence of new skin breakdown  Description: 1. Monitor for areas of redness and/or skin breakdown  2. Assess vascular access sites hourly  3. Every 4-6 hours minimum:  Change oxygen saturation probe site  4. Every 4-6 hours:  If on nasal continuous positive airway pressure, respiratory therapy assess nares and determine need for appliance change or resting period.   12/23/2022 0014 by Sunshine Coleman RN  Outcome: Progressing  12/22/2022 1308 by Weston Davis RN  Outcome: Progressing     Problem: Safety - Adult  Goal: Free from fall injury  12/23/2022 0014 by Sunshine Coleman RN  Outcome: Progressing  12/22/2022 1308 by Weston Davis RN  Outcome: Progressing

## 2022-12-23 NOTE — DISCHARGE INSTRUCTIONS
Your information:  Name: Shelby Sharp  : 1930    Your instructions:  Discharge home. Follow up with primary care provider as directed. Signs and symptoms to look out for:  Call 911 anytime you think you may need emergency care. For example, call if:    You have sudden chest pain and shortness of breath. You have severe trouble breathing. Call your doctor now or seek immediate medical care if:    You have wheezing and difficulty breathing. You are dizzy or lightheaded, or you feel like you may faint. You cough up clots of blood. You have any new symptoms, such as chest pain with difficulty breathing or a fever. Watch closely for changes in your health, and be sure to contact your doctor if:    You do not get better as expected. What to do after you leave the hospital:    Recommended diet: regular diet    Recommended activity: activity as tolerated    The following personal items were collected during your admission and were returned to you:    Belongings  Dental Appliances: None  Vision - Corrective Lenses: Eyeglasses, Sent home  Hearing Aid: None  Clothing: Footwear, Pants, Shirt  Jewelry: None  Body Piercings Removed: No  Electronic Devices: Cell Phone  Weapons (Notify Protective Services/Security): None  Other Valuables: Sent home  Home Medications: None  Valuables Given To: Family (Comment)  Provide Name(s) of Who Valuable(s) Were Given To: Ana  Responsible person(s) in the waiting room: [address] obtained by:  By signing below, I understand that if any problems occur once I leave the hospital I am to contact Dr. Clara Dolan. I understand and acknowledge receipt of the instructions indicated above.

## 2022-12-23 NOTE — PROGRESS NOTES
Pulmonary/Critical Care Progress Note    We are following patient for hemoptysis, coagulopathy, hyperlipidemia, hypertension, history of prostate cancer    SUBJECTIVE:  Patient has had an uneventful day and has been sitting in a chair without any difficulty. He does have an occasional cough productive of whitish sputum but there is been no hemoptysis today. I suspect the patient developed an upper respiratory tract infection and possibly a small pneumonia, most likely viral in etiology which has been improving, supported by aerosolized bronchodilators and IV antibiotics. INR today is 2.3, essentially unchanged from 2 days ago. If the patient continues to do well tomorrow, I would consider discharge home as appropriate, on oral antibiotics. MEDICATIONS:   sodium chloride flush  5-40 mL IntraVENous 2 times per day    [Held by provider] furosemide  40 mg IntraVENous Daily    albuterol  2.5 mg Nebulization 4x daily    azithromycin  250 mg IntraVENous Q24H    cefTRIAXone (ROCEPHIN) IV  1,000 mg IntraVENous Q24H      sodium chloride       guaiFENesin-dextromethorphan, sodium chloride flush, sodium chloride, ondansetron **OR** ondansetron, polyethylene glycol, acetaminophen **OR** acetaminophen, perflutren lipid microspheres, perflutren lipid microspheres      REVIEW OF SYSTEMS:  Constitutional: Denies fever, weight loss, night sweats, and fatigue  Skin: Denies pigmentation, dark lesions, and rashes   HEENT: Denies hearing loss, tinnitus, ear drainage, epistaxis, sore throat, and hoarseness. Cardiovascular: Denies palpitations, chest pain, and chest pressure. Respiratory: Denies cough, dyspnea at rest, hemoptysis, apnea, and choking.   Gastrointestinal: Denies nausea, vomiting, poor appetite, diarrhea, heartburn or reflux  Genitourinary: Denies dysuria, frequency, urgency or hematuria  Musculoskeletal: Denies myalgias, muscle weakness, and bone pain  Neurological: Denies dizziness, vertigo, headache, and focal weakness  Psychological: Denies anxiety and depression  Endocrine: Denies heat intolerance and cold intolerance  Hematopoietic/Lymphatic: Denies bleeding problems and blood transfusions    OBJECTIVE:  Vitals:    12/23/22 1634   BP: (!) 148/94   Pulse: 67   Resp: 18   Temp: 97.9 °F (36.6 °C)   SpO2: 94%           O2 Device: None (Room air)    PHYSICAL EXAM:  Constitutional: No fever, chills, diaphoresis  Skin: No skin rash, no skin breakdown  HEENT: Unremarkable  Neck: No JVD, lymphadenopathy, thyromegaly  Cardiovascular: S1, S2 regular. No S3 or rubs present  Respiratory: Clear to auscultation bilaterally  Gastrointestinal: Soft, flat, nontender  Genitourinary: No CVA tenderness  Extremities: No clubbing, cyanosis, or edema  Neurological: Alert awake oriented x3. No evidence of focal motor or sensory deficits  Psychological: Appropriate affect. In good spirits.     LABS:  WBC   Date Value Ref Range Status   12/23/2022 5.5 4.5 - 11.5 E9/L Final   12/21/2022 7.0 4.5 - 11.5 E9/L Final   06/03/2022 7.3 4.5 - 11.5 E9/L Final     Hemoglobin   Date Value Ref Range Status   12/23/2022 12.5 12.5 - 16.5 g/dL Final   12/21/2022 12.9 12.5 - 16.5 g/dL Final   06/03/2022 14.4 12.5 - 16.5 g/dL Final     Hematocrit   Date Value Ref Range Status   12/23/2022 36.8 (L) 37.0 - 54.0 % Final   12/21/2022 39.9 37.0 - 54.0 % Final   06/03/2022 44.1 37.0 - 54.0 % Final     MCV   Date Value Ref Range Status   12/23/2022 97.4 80.0 - 99.9 fL Final   12/21/2022 98.3 80.0 - 99.9 fL Final   06/03/2022 100.2 (H) 80.0 - 99.9 fL Final     Platelets   Date Value Ref Range Status   12/23/2022 147 130 - 450 E9/L Final   12/21/2022 134 130 - 450 E9/L Final   06/03/2022 149 130 - 450 E9/L Final     Sodium   Date Value Ref Range Status   12/23/2022 142 132 - 146 mmol/L Final   12/21/2022 134 132 - 146 mmol/L Final   06/03/2022 138 132 - 146 mmol/L Final     Potassium   Date Value Ref Range Status   12/23/2022 3.2 (L) 3.5 - 5.0 mmol/L Final   12/21/2022 3.8 3.5 - 5.0 mmol/L Final   06/03/2022 4.4 3.5 - 5.0 mmol/L Final     Potassium reflex Magnesium   Date Value Ref Range Status   03/19/2021 3.9 3.5 - 5.0 mmol/L Final     Chloride   Date Value Ref Range Status   12/23/2022 101 98 - 107 mmol/L Final   12/21/2022 96 (L) 98 - 107 mmol/L Final   06/03/2022 102 98 - 107 mmol/L Final     CO2   Date Value Ref Range Status   12/23/2022 28 22 - 29 mmol/L Final   12/21/2022 26 22 - 29 mmol/L Final   06/03/2022 25 22 - 29 mmol/L Final     BUN   Date Value Ref Range Status   12/23/2022 25 (H) 6 - 23 mg/dL Final   12/21/2022 22 6 - 23 mg/dL Final   06/03/2022 22 6 - 23 mg/dL Final     Creatinine   Date Value Ref Range Status   12/23/2022 1.0 0.7 - 1.2 mg/dL Final   12/21/2022 1.0 0.7 - 1.2 mg/dL Final   06/03/2022 0.8 0.7 - 1.2 mg/dL Final     Glucose   Date Value Ref Range Status   12/23/2022 86 74 - 99 mg/dL Final   12/21/2022 102 (H) 74 - 99 mg/dL Final   06/03/2022 109 (H) 74 - 99 mg/dL Final   03/05/2012 115 (H) 70 - 110 mg/dL Final   08/09/2011 90 70 - 110 mg/dL Final     Calcium   Date Value Ref Range Status   12/23/2022 8.8 8.6 - 10.2 mg/dL Final   12/21/2022 9.1 8.6 - 10.2 mg/dL Final   06/03/2022 9.0 8.6 - 10.2 mg/dL Final     Total Protein   Date Value Ref Range Status   12/23/2022 6.6 6.4 - 8.3 g/dL Final   12/21/2022 7.5 6.4 - 8.3 g/dL Final   06/03/2022 7.4 6.4 - 8.3 g/dL Final     Albumin   Date Value Ref Range Status   12/23/2022 3.5 3.5 - 5.2 g/dL Final   12/21/2022 4.0 3.5 - 5.2 g/dL Final   06/03/2022 3.8 3.5 - 5.2 g/dL Final   03/05/2012 4.0 3.2 - 4.8 g/dL Final   08/09/2011 3.9 3.2 - 4.8 g/dL Final     Total Bilirubin   Date Value Ref Range Status   12/23/2022 2.4 (H) 0.0 - 1.2 mg/dL Final   12/21/2022 3.2 (H) 0.0 - 1.2 mg/dL Final   06/03/2022 1.1 0.0 - 1.2 mg/dL Final     Alkaline Phosphatase   Date Value Ref Range Status   12/23/2022 90 40 - 129 U/L Final   12/21/2022 101 40 - 129 U/L Final   06/03/2022 73 40 - 129 U/L Final     AST   Date Value Ref Range Status   12/23/2022 28 0 - 39 U/L Final   12/21/2022 27 0 - 39 U/L Final   06/03/2022 21 0 - 39 U/L Final     Comment:     Specimen is slightly Hemolyzed. Result may be artificially increased. ALT   Date Value Ref Range Status   12/23/2022 10 0 - 40 U/L Final   12/21/2022 10 0 - 40 U/L Final   06/03/2022 7 0 - 40 U/L Final     Est, Glom Filt Rate   Date Value Ref Range Status   12/23/2022 >60 >=60 mL/min/1.73 Final     Comment:     Pediatric calculator link  [employer]/professionals/kdoqi/gfr_calculatorped  Effective Oct 3, 2022  These results are not intended for use in patients  <25years of age. eGFR results are calculated without  a race factor using the 2021 CKD-EPI equation. Careful  clinical correlation is recommended, particularly when  comparing to results calculated using previous equations. The CKD-EPI equation is less accurate in patients with  extremes of muscle mass, extra-renal metabolism of  creatinine, excessive creatinine ingestion, or following  therapy that affects renal tubular secretion. 12/21/2022 >60 >=60 mL/min/1.73 Final     Comment:     Pediatric calculator link  [employer]/professionals/kdoqi/gfr_calculatorped  Effective Oct 3, 2022  These results are not intended for use in patients  <25years of age. eGFR results are calculated without  a race factor using the 2021 CKD-EPI equation. Careful  clinical correlation is recommended, particularly when  comparing to results calculated using previous equations. The CKD-EPI equation is less accurate in patients with  extremes of muscle mass, extra-renal metabolism of  creatinine, excessive creatinine ingestion, or following  therapy that affects renal tubular secretion. GFR Non-   Date Value Ref Range Status   06/03/2022 >60 >=60 mL/min/1.73 Final     Comment:     Chronic Kidney Disease: less than 60 ml/min/1.73 sq.m.           Kidney Failure: less than 15 ml/min/1.73 sq.m.  Results valid for patients 18 years and older. 09/01/2021 >60 >=60 mL/min/1.73 Final     Comment:     Chronic Kidney Disease: less than 60 ml/min/1.73 sq.m. Kidney Failure: less than 15 ml/min/1.73 sq.m. Results valid for patients 18 years and older. 04/04/2021 >60 >=60 mL/min/1.73 Final     Comment:     Chronic Kidney Disease: less than 60 ml/min/1.73 sq.m. Kidney Failure: less than 15 ml/min/1.73 sq.m. Results valid for patients 18 years and older. GFR    Date Value Ref Range Status   06/03/2022 >60  Final   09/01/2021 >60  Final   04/04/2021 >60  Final     Magnesium   Date Value Ref Range Status   12/23/2022 2.1 1.6 - 2.6 mg/dL Final   06/03/2022 2.1 1.6 - 2.6 mg/dL Final   03/05/2012 2.2 1.3 - 2.7 mg/dL Final     Phosphorus   Date Value Ref Range Status   12/23/2022 2.8 2.5 - 4.5 mg/dL Final     No results for input(s): PH, PO2, PCO2, HCO3, BE, O2SAT in the last 72 hours. RADIOLOGY:  XR CHEST PORTABLE   Final Result   Right pleural effusion is no longer apparent. It has resolved or diminished. CTA PULMONARY W CONTRAST   Final Result   No evidence of pulmonary embolism. Mild pulmonary edema. Small right greater than left pleural effusions. PROBLEM LIST:  Principal Problem:    Hemoptysis  Active Problems:    Acute bacterial bronchitis    A-fib (HCC)    Chronic anticoagulation    Hypertension  Resolved Problems:    * No resolved hospital problems. *      IMPRESSION:  Hemoptysis  Likely viral bronchitis and/or pneumonitis  History of atrial fibrillation on warfarin  Mild coagulopathy    PLAN:  Continue antibiotics and aerosols  Monitor INR  May consider discharge tomorrow if patient remains asymptomatic the remainder of today and overnight.       Electronically signed by Lela Porter MD on 12/23/2022 at 5:16 PM

## 2022-12-24 LAB
ALBUMIN SERPL-MCNC: 3.5 G/DL (ref 3.5–5.2)
ALP BLD-CCNC: 92 U/L (ref 40–129)
ALT SERPL-CCNC: 11 U/L (ref 0–40)
ANION GAP SERPL CALCULATED.3IONS-SCNC: 11 MMOL/L (ref 7–16)
AST SERPL-CCNC: 28 U/L (ref 0–39)
BASOPHILS ABSOLUTE: 0.02 E9/L (ref 0–0.2)
BASOPHILS RELATIVE PERCENT: 0.4 % (ref 0–2)
BILIRUB SERPL-MCNC: 1.7 MG/DL (ref 0–1.2)
BUN BLDV-MCNC: 24 MG/DL (ref 6–23)
CALCIUM SERPL-MCNC: 9 MG/DL (ref 8.6–10.2)
CHLORIDE BLD-SCNC: 104 MMOL/L (ref 98–107)
CO2: 28 MMOL/L (ref 22–29)
CREAT SERPL-MCNC: 0.8 MG/DL (ref 0.7–1.2)
CULTURE, RESPIRATORY: NORMAL
EOSINOPHILS ABSOLUTE: 0.19 E9/L (ref 0.05–0.5)
EOSINOPHILS RELATIVE PERCENT: 4.3 % (ref 0–6)
GFR SERPL CREATININE-BSD FRML MDRD: >60 ML/MIN/1.73
GLUCOSE BLD-MCNC: 100 MG/DL (ref 74–99)
HCT VFR BLD CALC: 40.2 % (ref 37–54)
HEMOGLOBIN: 13.4 G/DL (ref 12.5–16.5)
IMMATURE GRANULOCYTES #: 0.02 E9/L
IMMATURE GRANULOCYTES %: 0.4 % (ref 0–5)
INR BLD: 1.8
LYMPHOCYTES ABSOLUTE: 0.88 E9/L (ref 1.5–4)
LYMPHOCYTES RELATIVE PERCENT: 19.7 % (ref 20–42)
MCH RBC QN AUTO: 32.4 PG (ref 26–35)
MCHC RBC AUTO-ENTMCNC: 33.3 % (ref 32–34.5)
MCV RBC AUTO: 97.1 FL (ref 80–99.9)
MONOCYTES ABSOLUTE: 0.99 E9/L (ref 0.1–0.95)
MONOCYTES RELATIVE PERCENT: 22.1 % (ref 2–12)
NEUTROPHILS ABSOLUTE: 2.37 E9/L (ref 1.8–7.3)
NEUTROPHILS RELATIVE PERCENT: 53.1 % (ref 43–80)
PDW BLD-RTO: 14.1 FL (ref 11.5–15)
PLATELET # BLD: 172 E9/L (ref 130–450)
PMV BLD AUTO: 10.2 FL (ref 7–12)
POTASSIUM SERPL-SCNC: 3.8 MMOL/L (ref 3.5–5)
PROTHROMBIN TIME: 20.4 SEC (ref 9.3–12.4)
RBC # BLD: 4.14 E12/L (ref 3.8–5.8)
SMEAR, RESPIRATORY: NORMAL
SODIUM BLD-SCNC: 143 MMOL/L (ref 132–146)
TOTAL PROTEIN: 6.8 G/DL (ref 6.4–8.3)
WBC # BLD: 4.5 E9/L (ref 4.5–11.5)

## 2022-12-24 PROCEDURE — 2580000003 HC RX 258: Performed by: INTERNAL MEDICINE

## 2022-12-24 PROCEDURE — 85025 COMPLETE CBC W/AUTO DIFF WBC: CPT

## 2022-12-24 PROCEDURE — 6370000000 HC RX 637 (ALT 250 FOR IP): Performed by: INTERNAL MEDICINE

## 2022-12-24 PROCEDURE — 36415 COLL VENOUS BLD VENIPUNCTURE: CPT

## 2022-12-24 PROCEDURE — 85610 PROTHROMBIN TIME: CPT

## 2022-12-24 PROCEDURE — 99233 SBSQ HOSP IP/OBS HIGH 50: CPT | Performed by: INTERNAL MEDICINE

## 2022-12-24 PROCEDURE — 80053 COMPREHEN METABOLIC PANEL: CPT

## 2022-12-24 PROCEDURE — 1200000000 HC SEMI PRIVATE

## 2022-12-24 RX ORDER — AMOXICILLIN AND CLAVULANATE POTASSIUM 250; 62.5 MG/5ML; MG/5ML
250 POWDER, FOR SUSPENSION ORAL EVERY 12 HOURS SCHEDULED
Status: DISCONTINUED | OUTPATIENT
Start: 2022-12-24 | End: 2022-12-24

## 2022-12-24 RX ORDER — AMOXICILLIN AND CLAVULANATE POTASSIUM 250; 62.5 MG/5ML; MG/5ML
500 POWDER, FOR SUSPENSION ORAL EVERY 12 HOURS SCHEDULED
Status: DISCONTINUED | OUTPATIENT
Start: 2022-12-24 | End: 2022-12-25 | Stop reason: HOSPADM

## 2022-12-24 RX ORDER — AMOXICILLIN AND CLAVULANATE POTASSIUM 250; 62.5 MG/5ML; MG/5ML
250 POWDER, FOR SUSPENSION ORAL 2 TIMES DAILY
Qty: 50 ML | Refills: 0 | Status: SHIPPED | OUTPATIENT
Start: 2022-12-24 | End: 2022-12-29

## 2022-12-24 RX ORDER — WARFARIN SODIUM 6 MG/1
6 TABLET ORAL
Status: COMPLETED | OUTPATIENT
Start: 2022-12-24 | End: 2022-12-24

## 2022-12-24 RX ORDER — AZITHROMYCIN 200 MG/5ML
200 POWDER, FOR SUSPENSION ORAL DAILY
Qty: 30 ML | Refills: 0 | Status: SHIPPED | OUTPATIENT
Start: 2022-12-24 | End: 2022-12-30

## 2022-12-24 RX ORDER — ALBUTEROL SULFATE 2.5 MG/3ML
2.5 SOLUTION RESPIRATORY (INHALATION) EVERY 4 HOURS PRN
Status: DISCONTINUED | OUTPATIENT
Start: 2022-12-24 | End: 2022-12-25 | Stop reason: HOSPADM

## 2022-12-24 RX ADMIN — Medication 10 ML: at 10:04

## 2022-12-24 RX ADMIN — POLYETHYLENE GLYCOL 3350 17 G: 17 POWDER, FOR SOLUTION ORAL at 10:18

## 2022-12-24 RX ADMIN — AZITHROMYCIN 250 MG: 100 POWDER, FOR SUSPENSION ORAL at 14:12

## 2022-12-24 RX ADMIN — AMOXICILLIN AND CLAVULANATE POTASSIUM 500 MG: 250; 62.5 POWDER, FOR SUSPENSION ORAL at 14:23

## 2022-12-24 RX ADMIN — WARFARIN SODIUM 6 MG: 6 TABLET ORAL at 17:35

## 2022-12-24 NOTE — PROGRESS NOTES
[address] [place], [place] [address]   Progress Note    Admitting Date and Time: 12/21/2022  5:45 PM  Admit Dx: Hemoptysis [R04.2]  Acute bacterial bronchitis [J20.8, B96.89]    Subjective/interval history:    12/23: Pt awake, alert, sitting up in chair. Has not had any episodes of hemoptysis today. Breathing comfortably on room air. Started on ceftriaxone and azithromycin per pulmonology for probable bacterial bronchitis. No masses or nodules on CT. Likely etiology of hemoptysis is bronchitis while on anticoagulation with warfarin. No plans for bronchoscopy at this time per pulmonology. 12/24: Patient is leaving in chair, awakens to voice. Denies any complaints today. No further hemoptysis. I spoke with patient's daughter, Willian Banda, over the phone this morning. Discussed with pulmonology this morning who recommended outpatient bronchoscopy. Discussed the possibility of outpatient bronchoscopy with pulmonology to assess for endobronchial lesion. She states that she will speak to her father about this, as she does not know if he would wish to have aggressive treatment should malignancy be identified. Spoke with patient and patient's daughter regarding warfarin; they would prefer to restart warfarin and see pulmonology as an outpatient before making decision regarding bronchoscopy.     ROS: denies fever, chills, cp, sob, n/v, HA unless stated above.     warfarin  6 mg Oral Once    amoxicillin-clavulanate  250 mg Oral 2 times per day    azithromycin  200 mg Oral Daily    sodium chloride flush  5-40 mL IntraVENous 2 times per day    [Held by provider] furosemide  40 mg IntraVENous Daily     albuterol, 2.5 mg, Q4H PRN  guaiFENesin-dextromethorphan, 10 mL, TID PRN  sodium chloride flush, 5-40 mL, PRN  sodium chloride, , PRN  ondansetron, 4 mg, Q8H PRN   Or  ondansetron, 4 mg, Q6H PRN  polyethylene glycol, 17 g, Daily PRN  acetaminophen, 650 mg, Q6H PRN   Or  acetaminophen, 650 mg, Q6H PRN  perflutren lipid microspheres, 1.5 mL, ONCE PRN  perflutren lipid microspheres, 1.5 mL, ONCE PRN       Objective:    BP (!) 160/84   Pulse 62   Temp 97.7 °F (36.5 °C) (Oral)   Resp 18   Ht 5' 6\" (1.676 m)   Wt 140 lb (63.5 kg)   SpO2 97%   BMI 22.60 kg/m²   General Appearance: Elderly male, awake, alert, sitting up in chair. Not in any distress  Skin: warm and dry  Head: normocephalic and atraumatic  Eyes: pupils equal, round, and reactive to light, extraocular eye movements intact, conjunctivae normal  Neck: neck supple and non tender without mass   Pulmonary/Chest: Nonlabored on room air. Mild bilateral rhonchi  Cardiovascular: normal rate, normal S1 and S2 and no carotid bruits  Abdomen: soft, non-tender, non-distended, normal bowel sounds, no masses or organomegaly  Extremities: no cyanosis, no clubbing and no edema  Neurologic: no cranial nerve deficit and speech normal      Recent Labs     12/21/22 1937 12/23/22  0433 12/24/22  0440    142 143   K 3.8 3.2* 3.8   CL 96* 101 104   CO2 26 28 28   BUN 22 25* 24*   CREATININE 1.0 1.0 0.8   GLUCOSE 102* 86 100*   CALCIUM 9.1 8.8 9.0         Recent Labs     12/21/22 1937 12/23/22  0433 12/24/22  0440   ALKPHOS 101 90 92   PROT 7.5 6.6 6.8   LABALBU 4.0 3.5 3.5   BILITOT 3.2* 2.4* 1.7*   AST 27 28 28   ALT 10 10 11         Recent Labs     12/21/22 1937 12/23/22 0433 12/24/22  0440   WBC 7.0 5.5 4.5   RBC 4.06 3.78* 4.14   HGB 12.9 12.5 13.4   HCT 39.9 36.8* 40.2   MCV 98.3 97.4 97.1   MCH 31.8 33.1 32.4   MCHC 32.3 34.0 33.3   RDW 14.5 14.2 14.1    147 172   MPV 10.2 10.7 10.2           Radiology:   XR CHEST PORTABLE   Final Result   Right pleural effusion is no longer apparent. It has resolved or diminished. CTA PULMONARY W CONTRAST   Final Result   No evidence of pulmonary embolism. Mild pulmonary edema. Small right greater than left pleural effusions.              Assessment and Plan:  Principal Problem:    Hemoptysis  Active Problems: Acute bacterial bronchitis    A-fib (HCC)    Chronic anticoagulation  Resolved Problems:    * No resolved hospital problems. *      Hemoptysis most likely due to acute bacterial bronchitis on anticoagulation  -No further episodes  -Started on ceftriaxone and azithromycin 12/22 per pulmonology. Will transition to oral Augmentin and azithromycin today (antibiotic choices taking into account he is on warfarin; discussed with pharmacist)  -Sputum culture in process  -We will need outpatient bronchoscopy. See discussion noted above    2. Paroxysmal atrial fibrillation  -Restart warfarin today; 6 mg p.o. x1  -Daily PT/INR for now    3. Possible Parkinson's disease  -Follow-up with primary care physician as an outpatient    4. Early dementia  -Supportive care  -Follow-up with primary care physician    DVT prophylaxis: Warfarin  Code status: Full code     Disposition: Possible discharge this evening versus tomorrow morning depending on clinical course. Patient's daughter notes he was more confused today. Scription sent to pharmacy in anticipation of possible discharge. Discussed with patient's daughter Lanette Muro over the phone this morning. Discussed with Dr. Shannon Rodriguez of pulmonary this morning      NOTE: Portions of this report were transcribed using voice recognition software. Every effort was made to ensure accuracy; however, inadvertent computerized transcription errors may be present.        Electronically signed by Myrla Baumgarten, DO on 12/24/2022 at 11:18 AM

## 2022-12-24 NOTE — RT PROTOCOL NOTE
RT Nebulizer Bronchodilator Protocol Note    There is a bronchodilator order in the chart from a provider indicating to follow the RT Bronchodilator Protocol and there is an Initiate RT Bronchodilator Protocol order as well (see protocol at bottom of note). CXR Findings:  XR CHEST PORTABLE    Result Date: 12/23/2022  Right pleural effusion is no longer apparent. It has resolved or diminished. The findings from the last RT Protocol Assessment were as follows:  Smoking: None or smoker <15 pack years  Respiratory Pattern: Regular pattern and RR 12-20 bpm  Breath Sounds: Clear breath sounds  Cough: Strong, spontaneous, non-productive  Indication for Bronchodilator Therapy:    Bronchodilator Assessment Score: 0    Aerosolized bronchodilator medication orders have been revised according to the RT Nebulizer Bronchodilator Protocol below. Respiratory Therapist to perform RT Therapy Protocol Assessment initially then follow the protocol. Repeat RT Therapy Protocol Assessment PRN for score 0-3 or on second treatment, BID, and PRN for scores above 3. No Indications - adjust the frequency to every 6 hours PRN wheezing or bronchospasm, if no treatments needed after 48 hours then discontinue using Per Protocol order mode. If indication present, adjust the RT bronchodilator orders based on the Bronchodilator Assessment Score as indicated below. If a patient is on this medication at home then do not decrease Frequency below that used at home. 0-3 - enter or revise RT bronchodilator order(s) to equivalent RT Bronchodilator order with Frequency of every 4 hours PRN for wheezing or increased work of breathing using Per Protocol order mode. 4-6 - enter or revise RT Bronchodilator order(s) to two equivalent RT bronchodilator orders with one order with BID Frequency and one order with Frequency of every 4 hours PRN wheezing or increased work of breathing using Per Protocol order mode.          7-10 - enter or revise RT Bronchodilator order(s) to two equivalent RT bronchodilator orders with one order with TID Frequency and one order with Frequency of every 4 hours PRN wheezing or increased work of breathing using Per Protocol order mode. 11-13 - enter or revise RT Bronchodilator order(s) to one equivalent RT bronchodilator order with QID Frequency and an Albuterol order with Frequency of every 4 hours PRN wheezing or increased work of breathing using Per Protocol order mode. Greater than 13 - enter or revise RT Bronchodilator order(s) to one equivalent RT bronchodilator order with every 4 hours Frequency and an Albuterol order with Frequency of every 2 hours PRN wheezing or increased work of breathing using Per Protocol order mode. RT to enter RT Home Evaluation for COPD & MDI Assessment order using Per Protocol order mode.     Electronically signed by Jeremy Kumar RCP on 12/24/2022 at 6:54 AM

## 2022-12-25 VITALS
OXYGEN SATURATION: 96 % | HEIGHT: 66 IN | RESPIRATION RATE: 18 BRPM | WEIGHT: 140 LBS | DIASTOLIC BLOOD PRESSURE: 64 MMHG | TEMPERATURE: 97.6 F | BODY MASS INDEX: 22.5 KG/M2 | HEART RATE: 61 BPM | SYSTOLIC BLOOD PRESSURE: 149 MMHG

## 2022-12-25 LAB
ALBUMIN SERPL-MCNC: 3.4 G/DL (ref 3.5–5.2)
ALP BLD-CCNC: 90 U/L (ref 40–129)
ALT SERPL-CCNC: 10 U/L (ref 0–40)
ANION GAP SERPL CALCULATED.3IONS-SCNC: 10 MMOL/L (ref 7–16)
AST SERPL-CCNC: 23 U/L (ref 0–39)
BASOPHILS ABSOLUTE: 0.02 E9/L (ref 0–0.2)
BASOPHILS RELATIVE PERCENT: 0.3 % (ref 0–2)
BILIRUB SERPL-MCNC: 1.7 MG/DL (ref 0–1.2)
BUN BLDV-MCNC: 23 MG/DL (ref 6–23)
CALCIUM SERPL-MCNC: 8.8 MG/DL (ref 8.6–10.2)
CHLORIDE BLD-SCNC: 102 MMOL/L (ref 98–107)
CO2: 27 MMOL/L (ref 22–29)
CREAT SERPL-MCNC: 0.8 MG/DL (ref 0.7–1.2)
EOSINOPHILS ABSOLUTE: 0.31 E9/L (ref 0.05–0.5)
EOSINOPHILS RELATIVE PERCENT: 5.1 % (ref 0–6)
GFR SERPL CREATININE-BSD FRML MDRD: >60 ML/MIN/1.73
GLUCOSE BLD-MCNC: 95 MG/DL (ref 74–99)
HCT VFR BLD CALC: 39.8 % (ref 37–54)
HEMOGLOBIN: 13.3 G/DL (ref 12.5–16.5)
IMMATURE GRANULOCYTES #: 0.03 E9/L
IMMATURE GRANULOCYTES %: 0.5 % (ref 0–5)
INR BLD: 1.6
LYMPHOCYTES ABSOLUTE: 1 E9/L (ref 1.5–4)
LYMPHOCYTES RELATIVE PERCENT: 16.4 % (ref 20–42)
MCH RBC QN AUTO: 32.2 PG (ref 26–35)
MCHC RBC AUTO-ENTMCNC: 33.4 % (ref 32–34.5)
MCV RBC AUTO: 96.4 FL (ref 80–99.9)
MONOCYTES ABSOLUTE: 0.96 E9/L (ref 0.1–0.95)
MONOCYTES RELATIVE PERCENT: 15.7 % (ref 2–12)
NEUTROPHILS ABSOLUTE: 3.78 E9/L (ref 1.8–7.3)
NEUTROPHILS RELATIVE PERCENT: 62 % (ref 43–80)
PDW BLD-RTO: 14.1 FL (ref 11.5–15)
PLATELET # BLD: 197 E9/L (ref 130–450)
PMV BLD AUTO: 10.2 FL (ref 7–12)
POTASSIUM SERPL-SCNC: 3.8 MMOL/L (ref 3.5–5)
PROTHROMBIN TIME: 18.6 SEC (ref 9.3–12.4)
RBC # BLD: 4.13 E12/L (ref 3.8–5.8)
SODIUM BLD-SCNC: 139 MMOL/L (ref 132–146)
TOTAL PROTEIN: 6.6 G/DL (ref 6.4–8.3)
WBC # BLD: 6.1 E9/L (ref 4.5–11.5)

## 2022-12-25 PROCEDURE — 99239 HOSP IP/OBS DSCHRG MGMT >30: CPT | Performed by: INTERNAL MEDICINE

## 2022-12-25 PROCEDURE — 85610 PROTHROMBIN TIME: CPT

## 2022-12-25 PROCEDURE — 85025 COMPLETE CBC W/AUTO DIFF WBC: CPT

## 2022-12-25 PROCEDURE — 80053 COMPREHEN METABOLIC PANEL: CPT

## 2022-12-25 PROCEDURE — 36415 COLL VENOUS BLD VENIPUNCTURE: CPT

## 2022-12-25 RX ADMIN — AZITHROMYCIN 250 MG: 100 POWDER, FOR SUSPENSION ORAL at 08:38

## 2022-12-25 RX ADMIN — AMOXICILLIN AND CLAVULANATE POTASSIUM 500 MG: 250; 62.5 POWDER, FOR SUSPENSION ORAL at 08:38

## 2022-12-25 NOTE — DISCHARGE SUMMARY
[facility] Physician Discharge Summary       Mikayla Hernandez MD  [address] [phone]    Schedule an appointment as soon as possible for a visit in 2 week(s)      Shey Antunez MD  [address] [address]  [phone]    Follow up  Call to schedule outpatient evaluation for bronchoscopy      Activity level: As tolerated    Diet: ADULT DIET; Regular    Labs: Routine labs including periodic INR per primary care physician    Condition at discharge: Stable    Dispo: Home      Patient ID:  Mary Sharif  [id number]  17 y.o.  12/12/1930    Admit date: 12/21/2022    Discharge date and time:  12/25/2022  11:09 AM    Admission Diagnoses: Principal Problem:    Hemoptysis  Active Problems:    Acute bacterial bronchitis    A-fib (HCC)    Chronic anticoagulation  Resolved Problems:    * No resolved hospital problems. *      Discharge Diagnoses: Principal Problem:    Hemoptysis  Active Problems:    Acute bacterial bronchitis    A-fib (HCC)    Chronic anticoagulation  Resolved Problems:    * No resolved hospital problems. *      Consults:  IP CONSULT TO PULMONOLOGY    Procedures: None    Hospital Course: This is a 80-year-old male with a history significant for atrial fibrillation on chronic warfarin and very remote tobacco use admitted to the hospital after an episode of hemoptysis. CTA of the chest did not show evidence of pulmonary embolism or lung mass. Pulmonary consult was obtained and suspected bronchitis or bronchopneumonia based on clinical findings and imaging, started IV ceftriaxone and azithromycin. Patient improved and hemoptysis resolved. Pulmonology recommended outpatient bronchoscopy evaluation. I discussed this with patient and patient's daughter.   They are uncertain if patient would wish to pursue aggressive measures if he had an endobronchial malignant lesion, so they will follow-up with pulmonology to determine further work-up and treatment. Contact information to pulmonology office placed in discharge follow-up instructions. Will resume warfarin for now, and this can be held if necessary prior to outpatient bronchoscopy. Discussed seek immediate medical attention for any recurrence of significant hemoptysis. Prescribed oral antibiotics for discharge. Discharge Exam:  Vitals:    12/24/22 0519 12/24/22 1712 12/24/22 2100 12/25/22 0540   BP: (!) 160/84 (!) 173/96 (!) 162/78 (!) 149/64   Pulse: 62 61 68 61   Resp: 18 18 17 18   Temp: 97.7 °F (36.5 °C) 99.7 °F (37.6 °C) 97.8 °F (36.6 °C) 97.6 °F (36.4 °C)   TempSrc: Oral Oral Oral Oral   SpO2: 97% 100% 97% 96%   Weight:       Height:           General Appearance: Elderly male, awake, alert, sitting up in bed. Not in any distress  Skin: warm and dry  Head: normocephalic and atraumatic  Eyes: pupils equal, round, and reactive to light, extraocular eye movements intact, conjunctivae normal  Neck: neck supple and non tender without mass   Pulmonary/Chest: Nonlabored on room air. Mildly diminished but otherwise clear to auscultation bilaterally. Rhonchi have resolved. Cardiovascular: normal rate, normal S1 and S2 and no carotid bruits  Abdomen: soft, non-tender, non-distended, normal bowel sounds, no masses or organomegaly  Extremities: no cyanosis, no clubbing and no edema  Neurologic: no cranial nerve deficit and speech normal  I/O last 3 completed shifts: In: 200 [P.O.:420;  I.V.:10]  Out: 200 [Urine:200]  I/O this shift:  In: 120 [P.O.:120]  Out: -       LABS:  Recent Labs     12/23/22  0433 12/24/22  0440 12/25/22  0439    143 139   K 3.2* 3.8 3.8    104 102   CO2 28 28 27   BUN 25* 24* 23   CREATININE 1.0 0.8 0.8   GLUCOSE 86 100* 95   CALCIUM 8.8 9.0 8.8       Recent Labs     12/23/22  0433 12/24/22 0440 12/25/22 0439   WBC 5.5 4.5 6.1   RBC 3.78* 4.14 4.13   HGB 12.5 13.4 13.3   HCT 36.8* 40.2 39.8   MCV 97.4 97.1 96.4   MCH 33.1 32.4 32.2   MCHC 34.0 33.3 33.4   RDW 14.2 14.1 14.1    172 197   MPV 10.7 10.2 10.2         Imaging:  CTA PULMONARY W CONTRAST    Result Date: 12/21/2022  EXAMINATION: CTA OF THE CHEST 12/21/2022 8:16 pm TECHNIQUE: CTA of the chest was performed after the administration of intravenous contrast.  Multiplanar reformatted images are provided for review. MIP images are provided for review. Automated exposure control, iterative reconstruction, and/or weight based adjustment of the mA/kV was utilized to reduce the radiation dose to as low as reasonably achievable. COMPARISON: None. HISTORY: ORDERING SYSTEM PROVIDED HISTORY: Hemoptysis, tachycardia, hypoxia TECHNOLOGIST PROVIDED HISTORY: Reason for exam:->Hemoptysis, tachycardia, hypoxia Decision Support Exception - unselect if not a suspected or confirmed emergency medical condition->Emergency Medical Condition (MA) FINDINGS: Pulmonary Arteries: Pulmonary arteries are adequately opacified for evaluation. No evidence of intraluminal filling defect to suggest pulmonary embolism. Main pulmonary artery is normal in caliber. Mediastinum: No evidence of mediastinal lymphadenopathy. The heart and pericardium demonstrate no acute abnormality. Multi chamber cardiomegaly. There is no acute abnormality of the thoracic aorta. Left-sided cardiac device and leads in expected position. Lungs/pleura: No focal consolidation. There is mild interlobular septal thickening. Small right greater than left pleural effusions. No pneumothorax. Upper Abdomen:  Limited images of the upper abdomen demonstrate no acute abnormality. Soft Tissues/Bones: No acute bone or soft tissue abnormality. Chronic appearing compression deformities of T6 and L1 with near vertebral plana deformity of L1. No evidence of pulmonary embolism. Mild pulmonary edema. Small right greater than left pleural effusions.          Patient Instructions:   Current Discharge Medication List        START taking these medications Details   amoxicillin-clavulanate (AUGMENTIN) 250-62.5 MG/5ML suspension Take 5 mLs by mouth 2 times daily for 5 days  Qty: 50 mL, Refills: 0      azithromycin (ZITHROMAX) 200 MG/5ML suspension Take 5 mLs by mouth daily for 6 days  Qty: 30 mL, Refills: 0    Associated Diagnoses: Acute bacterial bronchitis           CONTINUE these medications which have NOT CHANGED    Details   guaiFENesin-dextromethorphan (ROBITUSSIN DM) 100-10 MG/5ML syrup Take 10 mLs by mouth 3 times daily as needed for Cough  Qty: 120 mL, Refills: 0      !! warfarin (COUMADIN) 5 MG tablet Take 1 tablet by mouth daily Take 5 mg by mouth  Qty: 90 tablet, Refills: 1      !! warfarin (COUMADIN) 6 MG tablet Take 1 tablet by mouth daily  Qty: 30 tablet, Refills: 3       !! - Potential duplicate medications found. Please discuss with provider. STOP taking these medications       amoxicillin (AMOXIL) 250 MG/5ML suspension Comments:   Reason for Stopping:                 Note that greater than 30 minutes was spent in preparing discharge papers, discussing discharge with patient, medication review, etc.    NOTE: This report was transcribed using voice recognition software. Every effort was made to ensure accuracy; however, inadvertent computerized transcription errors may be present.      Signed:  Electronically signed by Darcel Pallas, DO on 12/25/2022 at 11:09 AM

## 2023-05-03 ENCOUNTER — OFFICE VISIT (OUTPATIENT)
Dept: NON INVASIVE DIAGNOSTICS | Age: 88
End: 2023-05-03

## 2023-05-03 VITALS
RESPIRATION RATE: 16 BRPM | WEIGHT: 156 LBS | DIASTOLIC BLOOD PRESSURE: 72 MMHG | SYSTOLIC BLOOD PRESSURE: 122 MMHG | BODY MASS INDEX: 25.07 KG/M2 | HEART RATE: 62 BPM | HEIGHT: 66 IN

## 2023-05-03 DIAGNOSIS — I48.20 CHRONIC ATRIAL FIBRILLATION (HCC): Primary | ICD-10-CM

## 2023-05-03 RX ORDER — FUROSEMIDE 20 MG/1
20 TABLET ORAL EVERY OTHER DAY
COMMUNITY
Start: 2023-04-03

## 2023-09-26 DIAGNOSIS — I48.20 CHRONIC ATRIAL FIBRILLATION (HCC): Primary | ICD-10-CM

## 2023-11-03 ENCOUNTER — NURSE ONLY (OUTPATIENT)
Dept: NON INVASIVE DIAGNOSTICS | Age: 88
End: 2023-11-03

## 2023-11-03 DIAGNOSIS — I49.5 SINOATRIAL NODE DYSFUNCTION (HCC): ICD-10-CM

## 2023-11-03 DIAGNOSIS — Z95.0 CARDIAC PACEMAKER IN SITU: Primary | ICD-10-CM

## 2023-11-30 ENCOUNTER — HOSPITAL ENCOUNTER (INPATIENT)
Age: 88
End: 2023-11-30
Attending: STUDENT IN AN ORGANIZED HEALTH CARE EDUCATION/TRAINING PROGRAM | Admitting: STUDENT IN AN ORGANIZED HEALTH CARE EDUCATION/TRAINING PROGRAM
Payer: MEDICARE

## 2023-11-30 ENCOUNTER — APPOINTMENT (OUTPATIENT)
Dept: CT IMAGING | Age: 88
End: 2023-11-30
Attending: STUDENT IN AN ORGANIZED HEALTH CARE EDUCATION/TRAINING PROGRAM
Payer: MEDICARE

## 2023-11-30 DIAGNOSIS — R94.31 ACUTE ELECTROCARDIOGRAM CHANGES: ICD-10-CM

## 2023-11-30 DIAGNOSIS — R41.82 ALTERED MENTAL STATUS, UNSPECIFIED ALTERED MENTAL STATUS TYPE: Primary | ICD-10-CM

## 2023-11-30 DIAGNOSIS — R41.0 DELIRIUM: ICD-10-CM

## 2023-11-30 LAB
ALBUMIN SERPL-MCNC: 4.1 G/DL (ref 3.5–5.2)
ALP SERPL-CCNC: 95 U/L (ref 40–129)
ALT SERPL-CCNC: 10 U/L (ref 0–40)
AMMONIA PLAS-SCNC: 20 UMOL/L (ref 16–60)
ANION GAP SERPL CALCULATED.3IONS-SCNC: 13 MMOL/L (ref 7–16)
APAP SERPL-MCNC: 12 UG/ML (ref 10–30)
AST SERPL-CCNC: 24 U/L (ref 0–39)
BASOPHILS # BLD: 0.02 K/UL (ref 0–0.2)
BASOPHILS NFR BLD: 0 % (ref 0–2)
BILIRUB SERPL-MCNC: 1 MG/DL (ref 0–1.2)
BILIRUB UR QL STRIP: NEGATIVE
BUN SERPL-MCNC: 20 MG/DL (ref 6–23)
CALCIUM SERPL-MCNC: 9.4 MG/DL (ref 8.6–10.2)
CHLORIDE SERPL-SCNC: 99 MMOL/L (ref 98–107)
CLARITY UR: CLEAR
CO2 SERPL-SCNC: 24 MMOL/L (ref 22–29)
COLOR UR: YELLOW
CREAT SERPL-MCNC: 0.8 MG/DL (ref 0.7–1.2)
EOSINOPHIL # BLD: 0.14 K/UL (ref 0.05–0.5)
EOSINOPHILS RELATIVE PERCENT: 3 % (ref 0–6)
ERYTHROCYTE [DISTWIDTH] IN BLOOD BY AUTOMATED COUNT: 14.3 % (ref 11.5–15)
ETHANOLAMINE SERPL-MCNC: <10 MG/DL
FOLATE SERPL-MCNC: 19.1 NG/ML (ref 4.8–24.2)
GFR SERPL CREATININE-BSD FRML MDRD: >60 ML/MIN/1.73M2
GLUCOSE SERPL-MCNC: 118 MG/DL (ref 74–99)
GLUCOSE UR STRIP-MCNC: NEGATIVE MG/DL
HCT VFR BLD AUTO: 40 % (ref 37–54)
HGB BLD-MCNC: 12.7 G/DL (ref 12.5–16.5)
HGB UR QL STRIP.AUTO: NEGATIVE
IMM GRANULOCYTES # BLD AUTO: 0.03 K/UL (ref 0–0.58)
IMM GRANULOCYTES NFR BLD: 1 % (ref 0–5)
INR PPP: 1.6
KETONES UR STRIP-MCNC: NEGATIVE MG/DL
LACTATE BLDV-SCNC: 1.4 MMOL/L (ref 0.5–1.9)
LACTATE BLDV-SCNC: 2.1 MMOL/L (ref 0.5–1.9)
LEUKOCYTE ESTERASE UR QL STRIP: NEGATIVE
LYMPHOCYTES NFR BLD: 1.05 K/UL (ref 1.5–4)
LYMPHOCYTES RELATIVE PERCENT: 19 % (ref 20–42)
MCH RBC QN AUTO: 31.9 PG (ref 26–35)
MCHC RBC AUTO-ENTMCNC: 31.8 G/DL (ref 32–34.5)
MCV RBC AUTO: 100.5 FL (ref 80–99.9)
MONOCYTES NFR BLD: 0.98 K/UL (ref 0.1–0.95)
MONOCYTES NFR BLD: 18 % (ref 2–12)
NEUTROPHILS NFR BLD: 60 % (ref 43–80)
NEUTS SEG NFR BLD: 3.29 K/UL (ref 1.8–7.3)
NITRITE UR QL STRIP: NEGATIVE
PH UR STRIP: 6 [PH] (ref 5–9)
PLATELET # BLD AUTO: 157 K/UL (ref 130–450)
PMV BLD AUTO: 10.4 FL (ref 7–12)
POTASSIUM SERPL-SCNC: 4.4 MMOL/L (ref 3.5–5)
PROT SERPL-MCNC: 7.6 G/DL (ref 6.4–8.3)
PROT UR STRIP-MCNC: NEGATIVE MG/DL
PROTHROMBIN TIME: 18 SEC (ref 9.3–12.4)
RBC # BLD AUTO: 3.98 M/UL (ref 3.8–5.8)
RBC #/AREA URNS HPF: NORMAL /HPF
SALICYLATES SERPL-MCNC: <0.3 MG/DL (ref 0–30)
SODIUM SERPL-SCNC: 136 MMOL/L (ref 132–146)
SP GR UR STRIP: 1.02 (ref 1–1.03)
TOXIC TRICYCLIC SC,BLOOD: NEGATIVE
TROPONIN I SERPL HS-MCNC: 18 NG/L (ref 0–11)
TROPONIN I SERPL HS-MCNC: 20 NG/L (ref 0–11)
TSH SERPL DL<=0.05 MIU/L-ACNC: 2.92 UIU/ML (ref 0.27–4.2)
UROBILINOGEN UR STRIP-ACNC: 0.2 EU/DL (ref 0–1)
VIT B12 SERPL-MCNC: 347 PG/ML (ref 211–946)
WBC #/AREA URNS HPF: NORMAL /HPF
WBC OTHER # BLD: 5.5 K/UL (ref 4.5–11.5)

## 2023-11-30 PROCEDURE — 82607 VITAMIN B-12: CPT

## 2023-11-30 PROCEDURE — 93005 ELECTROCARDIOGRAM TRACING: CPT | Performed by: STUDENT IN AN ORGANIZED HEALTH CARE EDUCATION/TRAINING PROGRAM

## 2023-11-30 PROCEDURE — 80053 COMPREHEN METABOLIC PANEL: CPT

## 2023-11-30 PROCEDURE — 83921 ORGANIC ACID SINGLE QUANT: CPT

## 2023-11-30 PROCEDURE — 82746 ASSAY OF FOLIC ACID SERUM: CPT

## 2023-11-30 PROCEDURE — 80179 DRUG ASSAY SALICYLATE: CPT

## 2023-11-30 PROCEDURE — 84484 ASSAY OF TROPONIN QUANT: CPT

## 2023-11-30 PROCEDURE — 80143 DRUG ASSAY ACETAMINOPHEN: CPT

## 2023-11-30 PROCEDURE — 70450 CT HEAD/BRAIN W/O DYE: CPT

## 2023-11-30 PROCEDURE — 99285 EMERGENCY DEPT VISIT HI MDM: CPT

## 2023-11-30 PROCEDURE — 87086 URINE CULTURE/COLONY COUNT: CPT

## 2023-11-30 PROCEDURE — 99223 1ST HOSP IP/OBS HIGH 75: CPT | Performed by: STUDENT IN AN ORGANIZED HEALTH CARE EDUCATION/TRAINING PROGRAM

## 2023-11-30 PROCEDURE — 6360000002 HC RX W HCPCS: Performed by: STUDENT IN AN ORGANIZED HEALTH CARE EDUCATION/TRAINING PROGRAM

## 2023-11-30 PROCEDURE — 82140 ASSAY OF AMMONIA: CPT

## 2023-11-30 PROCEDURE — 2580000003 HC RX 258: Performed by: STUDENT IN AN ORGANIZED HEALTH CARE EDUCATION/TRAINING PROGRAM

## 2023-11-30 PROCEDURE — G0480 DRUG TEST DEF 1-7 CLASSES: HCPCS

## 2023-11-30 PROCEDURE — 81001 URINALYSIS AUTO W/SCOPE: CPT

## 2023-11-30 PROCEDURE — 87040 BLOOD CULTURE FOR BACTERIA: CPT

## 2023-11-30 PROCEDURE — 83605 ASSAY OF LACTIC ACID: CPT

## 2023-11-30 PROCEDURE — 85025 COMPLETE CBC W/AUTO DIFF WBC: CPT

## 2023-11-30 PROCEDURE — 87077 CULTURE AEROBIC IDENTIFY: CPT

## 2023-11-30 PROCEDURE — 1200000000 HC SEMI PRIVATE

## 2023-11-30 PROCEDURE — 85610 PROTHROMBIN TIME: CPT

## 2023-11-30 PROCEDURE — 80307 DRUG TEST PRSMV CHEM ANLYZR: CPT

## 2023-11-30 PROCEDURE — 84443 ASSAY THYROID STIM HORMONE: CPT

## 2023-11-30 RX ORDER — ENOXAPARIN SODIUM 100 MG/ML
40 INJECTION SUBCUTANEOUS DAILY
Status: DISCONTINUED | OUTPATIENT
Start: 2023-11-30 | End: 2023-11-30

## 2023-11-30 RX ORDER — ACETAMINOPHEN 650 MG/1
650 SUPPOSITORY RECTAL EVERY 6 HOURS PRN
Status: ACTIVE | OUTPATIENT
Start: 2023-11-30

## 2023-11-30 RX ORDER — CLONAZEPAM 0.5 MG/1
0.25 TABLET ORAL ONCE
Status: ON HOLD | COMMUNITY

## 2023-11-30 RX ORDER — MAGNESIUM SULFATE IN WATER 40 MG/ML
2000 INJECTION, SOLUTION INTRAVENOUS PRN
Status: ACTIVE | OUTPATIENT
Start: 2023-11-30

## 2023-11-30 RX ORDER — ONDANSETRON 2 MG/ML
4 INJECTION INTRAMUSCULAR; INTRAVENOUS EVERY 6 HOURS PRN
Status: ACTIVE | OUTPATIENT
Start: 2023-11-30

## 2023-11-30 RX ORDER — SODIUM CHLORIDE 0.9 % (FLUSH) 0.9 %
5-40 SYRINGE (ML) INJECTION PRN
Status: DISPENSED | OUTPATIENT
Start: 2023-11-30

## 2023-11-30 RX ORDER — SODIUM CHLORIDE 0.9 % (FLUSH) 0.9 %
5-40 SYRINGE (ML) INJECTION EVERY 12 HOURS SCHEDULED
Status: DISPENSED | OUTPATIENT
Start: 2023-11-30

## 2023-11-30 RX ORDER — SODIUM CHLORIDE 9 MG/ML
INJECTION, SOLUTION INTRAVENOUS PRN
Status: ACTIVE | OUTPATIENT
Start: 2023-11-30

## 2023-11-30 RX ORDER — SERTRALINE HYDROCHLORIDE 25 MG/1
25 TABLET, FILM COATED ORAL EVERY MORNING
Status: ON HOLD | COMMUNITY

## 2023-11-30 RX ORDER — POTASSIUM CHLORIDE 7.45 MG/ML
10 INJECTION INTRAVENOUS PRN
Status: ACTIVE | OUTPATIENT
Start: 2023-11-30

## 2023-11-30 RX ORDER — AMLODIPINE BESYLATE 5 MG/1
5 TABLET ORAL DAILY
Status: DISCONTINUED | OUTPATIENT
Start: 2023-11-30 | End: 2023-12-10

## 2023-11-30 RX ORDER — HYDRALAZINE HYDROCHLORIDE 20 MG/ML
10 INJECTION INTRAMUSCULAR; INTRAVENOUS EVERY 6 HOURS PRN
Status: DISPENSED | OUTPATIENT
Start: 2023-11-30

## 2023-11-30 RX ORDER — FUROSEMIDE 20 MG/1
10 TABLET ORAL EVERY OTHER DAY
Status: ACTIVE | OUTPATIENT
Start: 2023-12-01

## 2023-11-30 RX ORDER — ONDANSETRON 4 MG/1
4 TABLET, ORALLY DISINTEGRATING ORAL EVERY 8 HOURS PRN
Status: ACTIVE | OUTPATIENT
Start: 2023-11-30

## 2023-11-30 RX ORDER — ACETAMINOPHEN 325 MG/1
650 TABLET ORAL EVERY 6 HOURS PRN
Status: DISPENSED | OUTPATIENT
Start: 2023-11-30

## 2023-11-30 RX ORDER — POTASSIUM CHLORIDE 20 MEQ/1
40 TABLET, EXTENDED RELEASE ORAL PRN
Status: ACTIVE | OUTPATIENT
Start: 2023-11-30

## 2023-11-30 RX ORDER — CLONAZEPAM 0.5 MG/1
0.25 TABLET ORAL ONCE
Status: DISCONTINUED | OUTPATIENT
Start: 2023-11-30 | End: 2023-12-07

## 2023-11-30 RX ADMIN — HYDRALAZINE HYDROCHLORIDE 10 MG: 20 INJECTION, SOLUTION INTRAMUSCULAR; INTRAVENOUS at 20:11

## 2023-11-30 RX ADMIN — Medication 10 ML: at 21:14

## 2023-11-30 ASSESSMENT — PAIN - FUNCTIONAL ASSESSMENT: PAIN_FUNCTIONAL_ASSESSMENT: NONE - DENIES PAIN

## 2023-11-30 NOTE — ED NOTES
Blood Cultures taken using aseptic technique. Tops of blood culture bottles cleansed with alcohol prep pad for 10 seconds and let air dry. Skin cleansed with alcohol pad and then cleansed with chlorhexidine for 30 seconds and let air dry. Sites remained sterile throughout process. 1st set from the right ac and 2nd set from the left ac.       Sanya Chung RN  11/30/23 1400

## 2023-11-30 NOTE — ED NOTES
12 lead ekg given to dr. Bess Toth at this time-no new orders received.       Ender Pritchard RN  11/30/23 7607

## 2023-12-01 PROBLEM — R41.82 ALTERED MENTAL STATUS: Status: ACTIVE | Noted: 2023-12-01

## 2023-12-01 PROBLEM — N30.00 ACUTE CYSTITIS: Status: ACTIVE | Noted: 2023-12-01

## 2023-12-01 LAB
ANION GAP SERPL CALCULATED.3IONS-SCNC: 15 MMOL/L (ref 7–16)
BASOPHILS # BLD: 0.03 K/UL (ref 0–0.2)
BASOPHILS NFR BLD: 1 % (ref 0–2)
BUN SERPL-MCNC: 13 MG/DL (ref 6–23)
CALCIUM SERPL-MCNC: 8.8 MG/DL (ref 8.6–10.2)
CHLORIDE SERPL-SCNC: 105 MMOL/L (ref 98–107)
CHOLEST SERPL-MCNC: 171 MG/DL
CO2 SERPL-SCNC: 21 MMOL/L (ref 22–29)
CREAT SERPL-MCNC: 0.7 MG/DL (ref 0.7–1.2)
EKG ATRIAL RATE: 54 BPM
EKG Q-T INTERVAL: 450 MS
EKG QRS DURATION: 90 MS
EKG QTC CALCULATION (BAZETT): 471 MS
EKG R AXIS: -48 DEGREES
EKG T AXIS: -80 DEGREES
EKG VENTRICULAR RATE: 66 BPM
EOSINOPHIL # BLD: 0.15 K/UL (ref 0.05–0.5)
EOSINOPHILS RELATIVE PERCENT: 3 % (ref 0–6)
ERYTHROCYTE [DISTWIDTH] IN BLOOD BY AUTOMATED COUNT: 14.2 % (ref 11.5–15)
GFR SERPL CREATININE-BSD FRML MDRD: >60 ML/MIN/1.73M2
GLUCOSE SERPL-MCNC: 98 MG/DL (ref 74–99)
HBA1C MFR BLD: 5.9 % (ref 4–5.6)
HCT VFR BLD AUTO: 35.8 % (ref 37–54)
HDLC SERPL-MCNC: 56 MG/DL
HGB BLD-MCNC: 11.6 G/DL (ref 12.5–16.5)
IMM GRANULOCYTES # BLD AUTO: <0.03 K/UL (ref 0–0.58)
IMM GRANULOCYTES NFR BLD: 0 % (ref 0–5)
LDLC SERPL CALC-MCNC: 104 MG/DL
LYMPHOCYTES NFR BLD: 1.16 K/UL (ref 1.5–4)
LYMPHOCYTES RELATIVE PERCENT: 23 % (ref 20–42)
MCH RBC QN AUTO: 32 PG (ref 26–35)
MCHC RBC AUTO-ENTMCNC: 32.4 G/DL (ref 32–34.5)
MCV RBC AUTO: 98.6 FL (ref 80–99.9)
MONOCYTES NFR BLD: 1 K/UL (ref 0.1–0.95)
MONOCYTES NFR BLD: 20 % (ref 2–12)
NEUTROPHILS NFR BLD: 54 % (ref 43–80)
NEUTS SEG NFR BLD: 2.74 K/UL (ref 1.8–7.3)
PLATELET # BLD AUTO: 149 K/UL (ref 130–450)
PMV BLD AUTO: 10.4 FL (ref 7–12)
POTASSIUM SERPL-SCNC: 4 MMOL/L (ref 3.5–5)
RBC # BLD AUTO: 3.63 M/UL (ref 3.8–5.8)
SODIUM SERPL-SCNC: 141 MMOL/L (ref 132–146)
TRIGL SERPL-MCNC: 55 MG/DL
VLDLC SERPL CALC-MCNC: 11 MG/DL
WBC OTHER # BLD: 5.1 K/UL (ref 4.5–11.5)

## 2023-12-01 PROCEDURE — 85025 COMPLETE CBC W/AUTO DIFF WBC: CPT

## 2023-12-01 PROCEDURE — 83036 HEMOGLOBIN GLYCOSYLATED A1C: CPT

## 2023-12-01 PROCEDURE — 6360000002 HC RX W HCPCS: Performed by: INTERNAL MEDICINE

## 2023-12-01 PROCEDURE — 99222 1ST HOSP IP/OBS MODERATE 55: CPT | Performed by: PSYCHIATRY & NEUROLOGY

## 2023-12-01 PROCEDURE — 6370000000 HC RX 637 (ALT 250 FOR IP)

## 2023-12-01 PROCEDURE — 80061 LIPID PANEL: CPT

## 2023-12-01 PROCEDURE — 2580000003 HC RX 258: Performed by: STUDENT IN AN ORGANIZED HEALTH CARE EDUCATION/TRAINING PROGRAM

## 2023-12-01 PROCEDURE — 2580000003 HC RX 258: Performed by: INTERNAL MEDICINE

## 2023-12-01 PROCEDURE — 1200000000 HC SEMI PRIVATE

## 2023-12-01 PROCEDURE — 80048 BASIC METABOLIC PNL TOTAL CA: CPT

## 2023-12-01 PROCEDURE — 36415 COLL VENOUS BLD VENIPUNCTURE: CPT

## 2023-12-01 PROCEDURE — 93010 ELECTROCARDIOGRAM REPORT: CPT | Performed by: INTERNAL MEDICINE

## 2023-12-01 PROCEDURE — 6370000000 HC RX 637 (ALT 250 FOR IP): Performed by: STUDENT IN AN ORGANIZED HEALTH CARE EDUCATION/TRAINING PROGRAM

## 2023-12-01 PROCEDURE — 99232 SBSQ HOSP IP/OBS MODERATE 35: CPT | Performed by: INTERNAL MEDICINE

## 2023-12-01 RX ADMIN — APIXABAN 5 MG: 5 TABLET, FILM COATED ORAL at 20:37

## 2023-12-01 RX ADMIN — WATER 1000 MG: 1 INJECTION INTRAMUSCULAR; INTRAVENOUS; SUBCUTANEOUS at 17:11

## 2023-12-01 RX ADMIN — Medication 10 ML: at 08:55

## 2023-12-01 RX ADMIN — APIXABAN 5 MG: 5 TABLET, FILM COATED ORAL at 08:55

## 2023-12-01 RX ADMIN — Medication 10 ML: at 20:37

## 2023-12-01 RX ADMIN — SERTRALINE HYDROCHLORIDE 25 MG: 50 TABLET ORAL at 08:55

## 2023-12-01 RX ADMIN — AMLODIPINE BESYLATE 5 MG: 5 TABLET ORAL at 08:55

## 2023-12-01 NOTE — ED NOTES
Updated daughter on room assignment. Patient has one bag of belongings that is going upstairs with him.      Jack Doe RN  11/30/23 3212

## 2023-12-01 NOTE — PROGRESS NOTES
Pt. Became combative when pulling off ekg stickers; pt. Refused night time medication, telesitter ordered for safety, pt. Calmed down when left alone and lights dimmed, notified daughter, will continue to monitor.

## 2023-12-01 NOTE — ED NOTES
Nurse to nurse report given. All questions answered at this time.       Julianne Hicks RN  11/30/23 6953

## 2023-12-01 NOTE — ED NOTES
Patient changed into gown. Placed on external catheter. No complaints at this time.      Kristyn Corley RN  11/30/23 6498

## 2023-12-01 NOTE — ACP (ADVANCE CARE PLANNING)
Advance Care Planning   The patient has the following advanced directives on file:  Advance Directives       Power of  Living Will ACP-Advance Directive ACP-Power of Iron Garcia on 03/29/21 Filed on 03/29/21 April [facility]            The patient has appointed the following active healthcare agents:    Primary Decision Maker: Misti Padilla - Child - [phone]    The Patient has the following current code status:    Code Status: DNR-CCA      MICHEL Quiroz  12/1/2023

## 2023-12-01 NOTE — PLAN OF CARE
Problem: Safety - Adult  Goal: Free from fall injury  12/1/2023 1122 by Phuong Killian RN  Outcome: Progressing  12/1/2023 0107 by Jaime Sams RN  Outcome: Progressing     Problem: ABCDS Injury Assessment  Goal: Absence of physical injury  12/1/2023 1122 by Phuong Killian RN  Outcome: Progressing  12/1/2023 0107 by Jaime Sams RN  Outcome: Progressing

## 2023-12-01 NOTE — PROGRESS NOTES
Message sent to Dr Bree Sanchez to notify that this patients pacemaker is not MRI compatable, the MRI dept discontinued the order.

## 2023-12-01 NOTE — PROGRESS NOTES
[facility] Progress Note     Admitting Date and Time: 11/30/2023 12:11 PM  had concerns including Altered Mental Status (Pt from home and lives alone-per daughter pt with altered mental status x 3 days. Alert to self only at this time=normally aaox4. ). Admit Dx: Delirium [R41.0]  Acute electrocardiogram changes [R94.31]  Altered mental status, unspecified altered mental status type [R41.82]    Synopsis:    Mr. Boris Erickson, a 80y.o. year old male  who  has a past medical history of Atrial fibrillation ([address]), Cancer ([address]), Hyperlipidemia, Hypertension, and Syncope and collapse. Patient presented to the hospital with chief complaint of confusion for 3 days. History was obtained from patient's daughter at bedside, daughter stated that he started having confusion 3 days back. It gradually got worse today morning. He also complained of some headache. Patient's daughter further states that Mr. Jesus Perez has slow and shuffling gait, tremors. Neurology was consulted and the patient was admitted to the hospital.        Subjective:  Patient is being followed for Delirium [R41.0]  Acute electrocardiogram changes [R94.31]  Altered mental status, unspecified altered mental status type [R41.82]     Patient was seen and examined this morning at bedside  Patient is pleasantly confused  Daughter is at bedside  Overnight event reviewed    ROS: denies fever, chills, cp, sob, n/v, HA unless stated above.       cefTRIAXone (ROCEPHIN) IV  1,000 mg IntraVENous Q24H    sodium chloride flush  5-40 mL IntraVENous 2 times per day    amLODIPine  5 mg Oral Daily    apixaban  5 mg Oral BID    clonazePAM  0.25 mg Oral Once    [Held by provider] furosemide  10 mg Oral Every Other Day    sertraline  25 mg Oral QAM     sodium chloride flush, 5-40 mL, PRN  sodium chloride, , PRN  potassium chloride, 40 mEq,

## 2023-12-01 NOTE — CONSULTS
[facility]  Neurology Consult    Date:  12/1/2023  Patient Name:  Dewight Fothergill  YOB: 1930  MRN: [id number]     PCP:  Francisco Starr MD   Referring:  No ref. provider found      Chief Complaint: AMS, headache    History obtained from: patient and daughter    Chan Deng is a 80 y.o. male who presented with AMS and worsened mouth and hand tremors compared to baseline which may be due to age appropriate changes vs delirium in setting of UTI vs stroke. Hx of Afib with pacemaker      Plan  Ordered MMA level and A1C  Continue PT/OT for strength training  As his mouth and hand tremors do not significantly impair his ability to feed himself and grasp on to objects and his walker, recommend continue PT and will hold off on any meds for seizures as risk for fall is greater than benefit   Cannot have MRI brain due to pacemaker. Ordered CT head w/o contrast for tomorrow        History of Present Illness:  Anmol Webster is a 80 y.o. right handed male presenting for evaluation of AMS and tremors. On Thursday, patient's daughter noticed he was not able to hold his silverware properly and was eating his oatmeal with a fork and using the other end of the spoon to scoop his banana. He ran into the walls when walking with his walker. He also had worsening difficulty saying things he wanted to say. He also complained of a headache which he rarely gets. The day before, his daughter said he was at his baseline. He was able to walk with a walker and feed himself. She noted he called her a couple of nights prior and told her he had urinary incontinence around 9PM but was able to clean himself up. On arrival in ED, his BP was 208/88. His BP is usually low normal and he was not on antihypertensive meds. Lactic was slightly elevated at 2.1, B12 347. Ammonia and TSH were wnl. Urinalysis was unremarkable for infection but urine culture did grow klebsiella 10-50k. Blood culture was negative. CT head w/o contrast did not show an acute bleed or mass lesion and did showed periventricular leukomalacia as expected for his age. Medical History:   Past Medical History:   Diagnosis Date    Atrial fibrillation ([address])     Cancer ([address])     prostate     Hyperlipidemia     Hypertension     Syncope and collapse         Surgical History:   Past Surgical History:   Procedure Laterality Date    CARDIOVERSION  2/22/2010    Successful to NSR at [facility]. CARDIOVERSION  8/11/2011    Successful to NSR at [facility]. CATARACT REMOVAL WITH IMPLANT      COLONOSCOPY  09/19/2007    REDUNDANT COLON- [facility]    DOPPLER ECHOCARDIOGRAPHY  08/29/2019    Dr Celine Hawkins abnormal left ventricular systolic function--mod severe tricuspid regurg w/mild pulmonary hyn--mild mitral, aortic, pulmonic regurg. EYE SURGERY      CATARACT    HIP SURGERY Left 3/21/2021    LEFT HIP HEMIARTHROPLASTY [facility] NEPHEW) performed by Олег Horowitz MD at [address] Left 2-8-2013    Dual chamber-Dr. Yee-[employer]    PROSTATECTOMY      20 years ago    PROSTATECTOMY  02/01/1988    RETROPUBIC RADICAL WITH/WO NERVE SPARING        Family History:   Family History   Problem Relation Age of Onset    Heart Surgery Other        Social History:  Social History     Tobacco Use    Smoking status: Former    Smokeless tobacco: Never    Tobacco comments:     Quit 30 years ago.    Vaping Use    Vaping Use: Never used   Substance Use Topics    Alcohol use: Yes     Comment: social    Drug use: No        Current Medications:      Current Facility-Administered Medications   Medication Dose Route Frequency Provider Last Rate Last Admin    sodium chloride flush 0.9 % injection 5-40 mL  5-40 mL IntraVENous 2 times per day Aixa Ferrer MD   10 mL at 11/30/23 2114    sodium chloride flush 0.9 % injection 5-40 mL  5-40 mL IntraVENous PRN Aixa Ferrer MD        0.9 % sodium chloride infusion   IntraVENous PRN 8. 8   PROT 7.6  --    LABALBU 4.1  --    BILITOT 1.0  --    ALKPHOS 95  --    AST 24  --    ALT 10  --    WBC 5.5 5.1   RBC 3.98 3.63*   HGB 12.7 11.6*   HCT 40.0 35.8*   .5* 98.6   MCH 31.9 32.0   MCHC 31.8* 32.4   RDW 14.3 14.2    149   MPV 10.4 10.4   HDL  --  56       Imaging  CT HEAD WO CONTRAST   Final Result   1. There is no acute intracranial abnormality. Specifically, there is no   intracranial hemorrhage.    2. Atrophy and periventricular leukomalacia,         MRI BRAIN WO CONTRAST    (Results Pending)           Electronically signed by Nirav Mock MD on 12/1/2023 at 8:47 AM

## 2023-12-01 NOTE — ED NOTES
Alert to person only and per daughter this is new. Reports he hasnt been himself over the past couple days and worsening. Skin warm and dry. Pale in color. Respirations even and unlabored. Denying any cp or sob. Reports headache of the frontal lobe. Gait steady to and from restroom with assistance and urine sent. Remains in hallway. No distress noted.       Mitra Landa RN  11/30/23 1927

## 2023-12-01 NOTE — PROGRESS NOTES
4 Eyes Skin Assessment     NAME:  Anmol Plunkett  YOB: 1930  MEDICAL RECORD NUMBER:  [id number]    The patient is being assessed for  Admission    I agree that at least one RN has performed a thorough Head to Toe Skin Assessment on the patient. ALL assessment sites listed below have been assessed. Areas assessed by both nurses:    Head, Face, Ears, Shoulders, Back, Chest, Arms, Elbows, Hands, Sacrum. Buttock, Coccyx, Ischium, and Legs. Feet and Heels    Skin dry, BLE, BUE        Does the Patient have a Wound?  No noted wound(s)       Kerwin Prevention initiated by RN: Yes  Wound Care Orders initiated by RN: No    Pressure Injury (Stage 3,4, Unstageable, DTI, NWPT, and Complex wounds) if present, place Wound referral order by RN under : No    New Ostomies, if present place, Ostomy referral order under : No     Nurse 1 eSignature: Electronically signed by Karla Cartagena RN on 12/1/23 at 1:11 AM EST    **SHARE this note so that the co-signing nurse can place an eSignature**    Nurse 2 eSignature: Electronically signed by Aditi Lynn RN on 12/1/23 at 3:59 AM EST

## 2023-12-01 NOTE — H&P
History & Physical      PCP: Jennifer Mcgovern MD    Date of Admission: 11/30/2023    Date of Service: Pt seen/examined on 11/30/2023 and is     admitted to Inpatient with expected LOS greater than two midnights due to medical therapy. Chief Complaint:  had concerns including Altered Mental Status (Pt from home and lives alone-per daughter pt with altered mental status x 3 days. Alert to self only at this time=normally aaox4. ). History Of Present Illness:    Mr. Tonya Watson, a 80y.o. year old male  who  has a past medical history of Atrial fibrillation ([address]), Cancer ([address]), Hyperlipidemia, Hypertension, and Syncope and collapse. Patient presented to the hospital with chief complaint of confusion for 3 days. History was obtained from patient's daughter at bedside, daughter stated that he started having confusion 3 days back. It gradually got worse today morning. He also complained of some headache. He denies any weakness in upper or lower extremity. Patient denies any chest pain, shortness of breath, palpitation,  Patient denies any loss of consciousness, abnormal body movement. Patient is AO x 1. Patient's daughter further states that Mr. Lennox Morel has slow and shuffling gait, tremors. At the emergency department  Vitals blood pressure was elevated 200/80, afebrile, CT head was negative for any hemorrhage, urine analysis was negative for UTI. Patient does not have any history of hypertension      Past Medical History:        Diagnosis Date    Atrial fibrillation ([address])     Cancer ([address])     prostate     Hyperlipidemia     Hypertension     Syncope and collapse        Past Surgical History:        Procedure Laterality Date    CARDIOVERSION  2/22/2010    Successful to NSR at [facility]. CARDIOVERSION  8/11/2011    Successful to NSR at [facility].     CATARACT REMOVAL WITH IMPLANT      COLONOSCOPY  09/19/2007    REDUNDANT COLON- [facility] ECHOCARDIOGRAPHY  08/29/2019    Dr Nydia Darnell acute abnormality of the visualized skull or soft tissues. 1. There is no acute intracranial abnormality. Specifically, there is no intracranial hemorrhage. 2. Atrophy and periventricular leukomalacia,       ASSESSMENT:    Principal Problem:    Delirium  Resolved Problems:    * No resolved hospital problems. *    Delirium multifactorial differential diagnosis could be Parkinson disease, stroke, press  Hypertensive urgency  Abnormal EKG  Atrial fibrillation    PLAN:  Admit under medicine CT head was negative, will get MRI brain without contrast  TSH, vitamin B12  Urinalysis was negative  Control blood pressure start amlodipine 5 mg daily  Hydralazine as needed for elevated blood pressure  Lipid panel  Continue Eliquis 5 mg twice daily  Discussed with patient's daughter regarding abnormal EKG with ST depression in lead to 3 aVF wants to pursue conservative management for now  Neurology consult for suspicion of Parkinson's disease    Diet: ADULT DIET; Regular  Code Status: DNR-CCA  Surrogate decision maker confirmed with patient:   Extended Emergency Contact Information  Primary Emergency Contact: [facility] of [address] Phone: [phone]  Relation: Child      DVT Prophylaxis: []Lovenox []Heparin []PCD [] [address] []Encouraged ambulation  Disposition: []Med/Surg [] Intermediate [] ICU/CCU  Admit status: [] Observation [] Inpatient     +++++++++++++++++++++++++++++++++++++++++++++++++  [address] [place]  +++++++++++++++++++++++++++++++++++++++++++++++++  NOTE: This report was transcribed using voice recognition software. Every effort was made to ensure accuracy; however, inadvertent computerized transcription errors may be present.

## 2023-12-01 NOTE — CARE COORDINATION
Social Work/Case Management Transition of Care Planning (Gracia [place] [phone]): Patient presented to the hospital due to concerns of increased confusion, AMS. Patient was found to have delirium secondary to UTI. He is on IV Rocephin q24. CT of the head was ordered. PT/OT to jayshree.  Neurology has been consulted. Met with patient at bedside. He was oriented only to self. Phone call to daughter, Jolene Alejandro. Patient lives in a one story home with a ramp to enter. He lives alone but there are private duty caregivers in the home much of the day. Daughter assists with all aspects of care. Patient has a SPC, FWW, wheelchair, shower chair, and BSC. No oxygen needs in the home. PCP is Dr. Stephen Henderson. Pharmacy is [facility]. HHC history with [facility] and [address] [place] [address]. If HHC is needed, they want [facility]. [facility] history at [facility]. Discharge plan is to home with [facility] if needed. Referral information given to Annette at [facility]. Await response. CM/SW will follow. MICHEL Forrester  12/1/2023    Case Management Assessment  Initial Evaluation    Date/Time of Evaluation: 12/1/2023 4:18 PM  Assessment Completed by: MICHEL Forrester    If patient is discharged prior to next notation, then this note serves as note for discharge by case management. Patient Name: Giuliano Centeno                   YOB: 1930  Diagnosis: Delirium [R41.0]  Acute electrocardiogram changes [R94.31]  Altered mental status, unspecified altered mental status type [R41.82]                   Date / Time: 11/30/2023 12:11 PM    Patient Admission Status: Inpatient   Readmission Risk (Low < 19, Mod (19-27), High > 27): Readmission Risk Score: 10.7    Current PCP: Edwin Elizabeth MD  PCP verified by CM? Yes    Chart Reviewed: Yes      History Provided by: Child/Family  Patient Orientation: Alert and Oriented, Self    Patient Cognition: Dementia / Early Alzheimer's    Hospitalization in the last 30 days (Readmission):   No

## 2023-12-01 NOTE — ED NOTES
Notified resident that patient needs additional dose of 5mg of Eliquis tonight. Waiting for order to be placed to give to patient.      Marzena Lebron RN  11/30/23 5548

## 2023-12-01 NOTE — PROGRESS NOTES
Spoke with [employer] regarding the patients device. It is not mri compatible. Pt unable to have mri scan. Rn notified.

## 2023-12-02 ENCOUNTER — APPOINTMENT (OUTPATIENT)
Dept: CT IMAGING | Age: 88
End: 2023-12-02
Payer: MEDICARE

## 2023-12-02 ENCOUNTER — APPOINTMENT (OUTPATIENT)
Dept: ULTRASOUND IMAGING | Age: 88
End: 2023-12-02
Payer: MEDICARE

## 2023-12-02 LAB
ANION GAP SERPL CALCULATED.3IONS-SCNC: 17 MMOL/L (ref 7–16)
BASOPHILS # BLD: 0.02 K/UL (ref 0–0.2)
BASOPHILS NFR BLD: 0 % (ref 0–2)
BUN SERPL-MCNC: 17 MG/DL (ref 6–23)
CALCIUM SERPL-MCNC: 9.3 MG/DL (ref 8.6–10.2)
CHLORIDE SERPL-SCNC: 102 MMOL/L (ref 98–107)
CO2 SERPL-SCNC: 22 MMOL/L (ref 22–29)
CREAT SERPL-MCNC: 0.8 MG/DL (ref 0.7–1.2)
EOSINOPHIL # BLD: 0.1 K/UL (ref 0.05–0.5)
EOSINOPHILS RELATIVE PERCENT: 2 % (ref 0–6)
ERYTHROCYTE [DISTWIDTH] IN BLOOD BY AUTOMATED COUNT: 14.3 % (ref 11.5–15)
GFR SERPL CREATININE-BSD FRML MDRD: >60 ML/MIN/1.73M2
GLUCOSE SERPL-MCNC: 114 MG/DL (ref 74–99)
HCT VFR BLD AUTO: 36.8 % (ref 37–54)
HGB BLD-MCNC: 11.9 G/DL (ref 12.5–16.5)
IMM GRANULOCYTES # BLD AUTO: <0.03 K/UL (ref 0–0.58)
IMM GRANULOCYTES NFR BLD: 0 % (ref 0–5)
LYMPHOCYTES NFR BLD: 1.33 K/UL (ref 1.5–4)
LYMPHOCYTES RELATIVE PERCENT: 19 % (ref 20–42)
MCH RBC QN AUTO: 31.8 PG (ref 26–35)
MCHC RBC AUTO-ENTMCNC: 32.3 G/DL (ref 32–34.5)
MCV RBC AUTO: 98.4 FL (ref 80–99.9)
MICROORGANISM SPEC CULT: ABNORMAL
MICROORGANISM SPEC CULT: ABNORMAL
MONOCYTES NFR BLD: 1.08 K/UL (ref 0.1–0.95)
MONOCYTES NFR BLD: 16 % (ref 2–12)
NEUTROPHILS NFR BLD: 63 % (ref 43–80)
NEUTS SEG NFR BLD: 4.3 K/UL (ref 1.8–7.3)
PLATELET # BLD AUTO: 156 K/UL (ref 130–450)
PMV BLD AUTO: 10.8 FL (ref 7–12)
POTASSIUM SERPL-SCNC: 3.7 MMOL/L (ref 3.5–5)
RBC # BLD AUTO: 3.74 M/UL (ref 3.8–5.8)
SODIUM SERPL-SCNC: 141 MMOL/L (ref 132–146)
SPECIMEN DESCRIPTION: ABNORMAL
WBC OTHER # BLD: 6.9 K/UL (ref 4.5–11.5)

## 2023-12-02 PROCEDURE — 1200000000 HC SEMI PRIVATE

## 2023-12-02 PROCEDURE — 6370000000 HC RX 637 (ALT 250 FOR IP): Performed by: INTERNAL MEDICINE

## 2023-12-02 PROCEDURE — 85025 COMPLETE CBC W/AUTO DIFF WBC: CPT

## 2023-12-02 PROCEDURE — 97530 THERAPEUTIC ACTIVITIES: CPT

## 2023-12-02 PROCEDURE — 6370000000 HC RX 637 (ALT 250 FOR IP): Performed by: STUDENT IN AN ORGANIZED HEALTH CARE EDUCATION/TRAINING PROGRAM

## 2023-12-02 PROCEDURE — 93880 EXTRACRANIAL BILAT STUDY: CPT

## 2023-12-02 PROCEDURE — 99232 SBSQ HOSP IP/OBS MODERATE 35: CPT | Performed by: NURSE PRACTITIONER

## 2023-12-02 PROCEDURE — 6360000002 HC RX W HCPCS: Performed by: INTERNAL MEDICINE

## 2023-12-02 PROCEDURE — 99233 SBSQ HOSP IP/OBS HIGH 50: CPT | Performed by: INTERNAL MEDICINE

## 2023-12-02 PROCEDURE — 70450 CT HEAD/BRAIN W/O DYE: CPT

## 2023-12-02 PROCEDURE — 6370000000 HC RX 637 (ALT 250 FOR IP)

## 2023-12-02 PROCEDURE — 80048 BASIC METABOLIC PNL TOTAL CA: CPT

## 2023-12-02 PROCEDURE — 36415 COLL VENOUS BLD VENIPUNCTURE: CPT

## 2023-12-02 PROCEDURE — 2580000003 HC RX 258: Performed by: INTERNAL MEDICINE

## 2023-12-02 PROCEDURE — 97162 PT EVAL MOD COMPLEX 30 MIN: CPT

## 2023-12-02 PROCEDURE — 2580000003 HC RX 258: Performed by: STUDENT IN AN ORGANIZED HEALTH CARE EDUCATION/TRAINING PROGRAM

## 2023-12-02 RX ORDER — QUETIAPINE FUMARATE 25 MG/1
25 TABLET, FILM COATED ORAL 2 TIMES DAILY
Status: DISCONTINUED | OUTPATIENT
Start: 2023-12-02 | End: 2023-12-04

## 2023-12-02 RX ORDER — SODIUM CHLORIDE 9 MG/ML
INJECTION, SOLUTION INTRAVENOUS CONTINUOUS
Status: ACTIVE | OUTPATIENT
Start: 2023-12-02 | End: 2023-12-03

## 2023-12-02 RX ORDER — LABETALOL HYDROCHLORIDE 5 MG/ML
10 INJECTION, SOLUTION INTRAVENOUS EVERY 4 HOURS PRN
Status: ACTIVE | OUTPATIENT
Start: 2023-12-02

## 2023-12-02 RX ADMIN — APIXABAN 5 MG: 5 TABLET, FILM COATED ORAL at 09:00

## 2023-12-02 RX ADMIN — WATER 1000 MG: 1 INJECTION INTRAMUSCULAR; INTRAVENOUS; SUBCUTANEOUS at 15:02

## 2023-12-02 RX ADMIN — SERTRALINE HYDROCHLORIDE 25 MG: 50 TABLET ORAL at 09:00

## 2023-12-02 RX ADMIN — APIXABAN 5 MG: 5 TABLET, FILM COATED ORAL at 21:53

## 2023-12-02 RX ADMIN — Medication 10 ML: at 09:01

## 2023-12-02 RX ADMIN — QUETIAPINE FUMARATE 25 MG: 25 TABLET ORAL at 21:53

## 2023-12-02 RX ADMIN — SODIUM CHLORIDE: 9 INJECTION, SOLUTION INTRAVENOUS at 15:02

## 2023-12-02 RX ADMIN — QUETIAPINE FUMARATE 25 MG: 25 TABLET ORAL at 09:00

## 2023-12-02 RX ADMIN — AMLODIPINE BESYLATE 5 MG: 5 TABLET ORAL at 09:00

## 2023-12-02 ASSESSMENT — ENCOUNTER SYMPTOMS
ABDOMINAL DISTENTION: 0
COUGH: 0
NAUSEA: 0
PHOTOPHOBIA: 0
BACK PAIN: 0
DIARRHEA: 0
SHORTNESS OF BREATH: 0
ABDOMINAL PAIN: 0
CHEST TIGHTNESS: 0
VOMITING: 0

## 2023-12-02 NOTE — PROGRESS NOTES
Physical Therapy    Physical Therapy Initial Assessment     Name: Andrea Domínguez  : 1930  MRN: [id number]      Date of Service: 2023    Evaluating PT:  Nano Waller PT, DPT  [id number]     Room #:  3479/0289-S  Diagnosis:  Delirium [R41.0]  Acute electrocardiogram changes [R94.31]  Altered mental status, unspecified altered mental status type [R41.82]  PMHx/PSHx:   has a past medical history of Atrial fibrillation ([address]), Cancer ([address]), Hyperlipidemia, Hypertension, and Syncope and collapse. Procedure/Surgery:  None  Precautions:  Falls, Cognition, Speaks [place], [place]  Equipment Needs:  TBD    SUBJECTIVE:    Pt lives alone in a 1 story home with 1 step vs. Ramp to enter. Bedroom and bathroom are on the 1st level. Pt ambulated with FWW PRN PTA. Has aides to assist with ADLs. Dtr lives close by. OBJECTIVE:   Initial Evaluation  Date: 23 Treatment Short Term/ Long Term   Goals   AM-PAC 6 Clicks 72/47     Was pt agreeable to Eval/treatment? Yes      Does pt have pain? No c/o pain     Bed Mobility  Rolling: NT  Supine to sit: Max A  Sit to supine: Max A  Scooting: Mod A to EOB  Modified Independent     Transfers Sit to stand: Mod A  Stand to sit: Mod A  Stand pivot: NT  Modified Independent with AAD   Ambulation    Few side steps feet with B HHA Mod A x2  >150 feet with AAD Modified Independent     Stair negotiation: ascended and descended  NT  >1 steps with 1 rail Modified Independent     ROM BUE:  Per OT eval  BLE:  WFL     Strength BUE:  Per OT eval   BLE:  grossly >3/5     Balance Sitting EOB:  CGA  Dynamic Standing:   Mod A x1-2  Sitting EOB:  Independent   Dynamic Standing:  Modified Independent       Pt is A & O x 1  Sensation:  Pt denies numbness and tingling to extremities  Edema:  Unremarkable    Therapeutic Exercises:    BLE AROM at EOB  STS x 2 reps     Patient education  Pt educated on PT role, safety during functional mobility    Patient response to education:   Pt

## 2023-12-02 NOTE — PROGRESS NOTES
Occupational Therapy  Facility/Department: [facility]  Occupational Therapy Initial Assessment    Name: Sarah Decker  : 1930  MRN: [id number]  Date of Service: 2023    Evaluating OT: Alex Short OTR/L [id number]  Referring Provider: Ori Solomon MD  Specific Provider Orders: 23- OT eval & treat  Recommended Adaptive Equipment:  TBD     Diagnosis: AMS, delirium, UTI  Pertinent Medical History: dementia  Precautions:  Fall Risk, telesitter, recommend sitter due to pt debility but is actively trying to get OOB &  activating telesitter & RN aware    Assessment of current deficits   [x] Functional mobility  [x]ADLs  [x] Strength               [x]Cognition   [x] Functional transfers   [x] IADLs         [x] Safety Awareness   [x]Endurance   [x] Fine Coordination              [x] Balance      [x] Vision/perception   []Sensation    [x]Gross Motor Coordination  [x] ROM  [x] Delirium                   [x] Motor Control     OT PLAN OF CARE   OT POC based on physician orders, patient diagnosis and results of clinical assessment    Frequency/Duration:  1-3 days/wk for 2 weeks PRN   Specific OT Treatment to include:   * Instruction/training on adapted ADL techniques and AE recommendations to increase functional independence within precautions       * Training on energy conservation strategies, correct breathing pattern and techniques to improve independence/tolerance for self-care routine  * Functional transfer/mobility training/DME recommendations for increased independence, safety, and fall prevention  * Patient/Family education to increase follow through with safety techniques and functional independence  * Recommendation of environmental modifications for increased safety with functional transfers/mobility and ADLs  * Cognitive retraining/development of therapeutic activities to improve problem solving, judgement, memory, and attention for increased safety/participation in ADL/IADL tasks  * Sensory Total treatment time: 15       Treatment Charges: Mins Units   OT Eval Low 52993 15 1   OT Eval Medium 48464     OT Eval High 30467     OT Re-Eval 28355     Ther Ex  68683       Manual Therapy 12493       Thera Activities 81284  15 1   ADL/Home Mgt 01861     Neuro Re-ed 73677       Group Therapy        Orthotic manage/training  36500       Non-Billable Time       Ruslan Tao OTR/L [id number]

## 2023-12-03 VITALS
WEIGHT: 154.9 LBS | TEMPERATURE: 98.7 F | BODY MASS INDEX: 22.94 KG/M2 | DIASTOLIC BLOOD PRESSURE: 60 MMHG | SYSTOLIC BLOOD PRESSURE: 110 MMHG | OXYGEN SATURATION: 99 % | HEART RATE: 76 BPM | HEIGHT: 69 IN | RESPIRATION RATE: 18 BRPM

## 2023-12-03 LAB
ANION GAP SERPL CALCULATED.3IONS-SCNC: 15 MMOL/L (ref 7–16)
BASOPHILS # BLD: 0.03 K/UL (ref 0–0.2)
BASOPHILS NFR BLD: 1 % (ref 0–2)
BUN SERPL-MCNC: 14 MG/DL (ref 6–23)
CALCIUM SERPL-MCNC: 8.9 MG/DL (ref 8.6–10.2)
CHLORIDE SERPL-SCNC: 104 MMOL/L (ref 98–107)
CO2 SERPL-SCNC: 20 MMOL/L (ref 22–29)
CREAT SERPL-MCNC: 0.9 MG/DL (ref 0.7–1.2)
EOSINOPHIL # BLD: 0.21 K/UL (ref 0.05–0.5)
EOSINOPHILS RELATIVE PERCENT: 4 % (ref 0–6)
ERYTHROCYTE [DISTWIDTH] IN BLOOD BY AUTOMATED COUNT: 14.1 % (ref 11.5–15)
GFR SERPL CREATININE-BSD FRML MDRD: >60 ML/MIN/1.73M2
GLUCOSE SERPL-MCNC: 74 MG/DL (ref 74–99)
HCT VFR BLD AUTO: 35.3 % (ref 37–54)
HGB BLD-MCNC: 11.5 G/DL (ref 12.5–16.5)
IMM GRANULOCYTES # BLD AUTO: <0.03 K/UL (ref 0–0.58)
IMM GRANULOCYTES NFR BLD: 0 % (ref 0–5)
LYMPHOCYTES NFR BLD: 1.3 K/UL (ref 1.5–4)
LYMPHOCYTES RELATIVE PERCENT: 23 % (ref 20–42)
MCH RBC QN AUTO: 32.1 PG (ref 26–35)
MCHC RBC AUTO-ENTMCNC: 32.6 G/DL (ref 32–34.5)
MCV RBC AUTO: 98.6 FL (ref 80–99.9)
MICROORGANISM SPEC CULT: NORMAL
MICROORGANISM SPEC CULT: NORMAL
MONOCYTES NFR BLD: 0.99 K/UL (ref 0.1–0.95)
MONOCYTES NFR BLD: 18 % (ref 2–12)
NEUTROPHILS NFR BLD: 55 % (ref 43–80)
NEUTS SEG NFR BLD: 3.12 K/UL (ref 1.8–7.3)
PLATELET # BLD AUTO: 150 K/UL (ref 130–450)
PMV BLD AUTO: 11.1 FL (ref 7–12)
POTASSIUM SERPL-SCNC: 3.8 MMOL/L (ref 3.5–5)
RBC # BLD AUTO: 3.58 M/UL (ref 3.8–5.8)
SERVICE CMNT-IMP: NORMAL
SERVICE CMNT-IMP: NORMAL
SODIUM SERPL-SCNC: 139 MMOL/L (ref 132–146)
SPECIMEN DESCRIPTION: NORMAL
SPECIMEN DESCRIPTION: NORMAL
WBC OTHER # BLD: 5.7 K/UL (ref 4.5–11.5)

## 2023-12-03 PROCEDURE — 1200000000 HC SEMI PRIVATE

## 2023-12-03 PROCEDURE — 85025 COMPLETE CBC W/AUTO DIFF WBC: CPT

## 2023-12-03 PROCEDURE — 36415 COLL VENOUS BLD VENIPUNCTURE: CPT

## 2023-12-03 PROCEDURE — 80048 BASIC METABOLIC PNL TOTAL CA: CPT

## 2023-12-03 PROCEDURE — 6370000000 HC RX 637 (ALT 250 FOR IP): Performed by: STUDENT IN AN ORGANIZED HEALTH CARE EDUCATION/TRAINING PROGRAM

## 2023-12-03 PROCEDURE — 6370000000 HC RX 637 (ALT 250 FOR IP)

## 2023-12-03 PROCEDURE — 99232 SBSQ HOSP IP/OBS MODERATE 35: CPT | Performed by: INTERNAL MEDICINE

## 2023-12-03 PROCEDURE — 2580000003 HC RX 258: Performed by: INTERNAL MEDICINE

## 2023-12-03 PROCEDURE — 6360000002 HC RX W HCPCS: Performed by: INTERNAL MEDICINE

## 2023-12-03 PROCEDURE — 6370000000 HC RX 637 (ALT 250 FOR IP): Performed by: INTERNAL MEDICINE

## 2023-12-03 RX ADMIN — AMLODIPINE BESYLATE 5 MG: 5 TABLET ORAL at 09:37

## 2023-12-03 RX ADMIN — APIXABAN 5 MG: 5 TABLET, FILM COATED ORAL at 09:37

## 2023-12-03 RX ADMIN — SERTRALINE HYDROCHLORIDE 25 MG: 50 TABLET ORAL at 09:37

## 2023-12-03 RX ADMIN — WATER 1000 MG: 1 INJECTION INTRAMUSCULAR; INTRAVENOUS; SUBCUTANEOUS at 16:04

## 2023-12-03 NOTE — PLAN OF CARE
Problem: Safety - Adult  Goal: Free from fall injury  12/3/2023 1813 by Yisel Schneider RN  Outcome: Progressing  12/3/2023 0539 by Clari Fuller RN  Outcome: Progressing     Problem: ABCDS Injury Assessment  Goal: Absence of physical injury  12/3/2023 1813 by Yisel Schneider RN  Outcome: Progressing  12/3/2023 0539 by Clari Fuller RN  Outcome: Progressing     Problem: Pain  Goal: Verbalizes/displays adequate comfort level or baseline comfort level  12/3/2023 1813 by Yisel Schneider RN  Outcome: Progressing  12/3/2023 0539 by Clari Fuller RN  Outcome: Progressing

## 2023-12-03 NOTE — PLAN OF CARE
Plan of care    Spoke with family at bedside and pt is more agitated today. We will see how he does on the antibiotics and hopefully he returns to his baseline in the next couple days.

## 2023-12-03 NOTE — PROGRESS NOTES
were noted and reviewed. ASSESSMENT AND PLAN:    Principal Problem:    Delirium  Active Problems:    Acute cystitis    Altered mental status  Resolved Problems:    * No resolved hospital problems. *      Acute encephalopathy  Likely delirium secondary to urinary tract infection  Suspect underlying dementia  Electrolytes stable  Patient has pacemaker, unable to do MRI  PT OT following, likely needs placement   Ambulate and reorient the patient  Neurology following, appreciate recommendations  Trial of low-dose Seroquel    Urinary tract infection  Continue ceftriaxone  Urine culture growing klebsiella   Transition to oral on discharge     Uncontrolled hypertension  Stable this morning  Resume home medicine, amlodipine    Atrial fibrillation  Pacemaker in situ  Continue Eliquis and telemetry monitoring    Depression/anxiety  Continue home Zoloft    CODE STATUS: DNR CCA  DVT prophylaxis: Eliquis  GI prophylaxis: Diet  Disposition: Pending PT OT evaluation, likely needs placement     NOTE: This report was transcribed using voice recognition software. Every effort was made to ensure accuracy; however, inadvertent computerized transcription errors may be present.     Electronically signed by Roxane Olsen MD on 12/3/2023 at 2:17 PM

## 2023-12-04 LAB
ANION GAP SERPL CALCULATED.3IONS-SCNC: 18 MMOL/L (ref 7–16)
BASOPHILS # BLD: 0.03 K/UL (ref 0–0.2)
BASOPHILS NFR BLD: 1 % (ref 0–2)
BUN SERPL-MCNC: 20 MG/DL (ref 6–23)
CALCIUM SERPL-MCNC: 8.7 MG/DL (ref 8.6–10.2)
CHLORIDE SERPL-SCNC: 103 MMOL/L (ref 98–107)
CO2 SERPL-SCNC: 19 MMOL/L (ref 22–29)
CREAT SERPL-MCNC: 0.8 MG/DL (ref 0.7–1.2)
EOSINOPHIL # BLD: 0.12 K/UL (ref 0.05–0.5)
EOSINOPHILS RELATIVE PERCENT: 2 % (ref 0–6)
ERYTHROCYTE [DISTWIDTH] IN BLOOD BY AUTOMATED COUNT: 14.1 % (ref 11.5–15)
GFR SERPL CREATININE-BSD FRML MDRD: >60 ML/MIN/1.73M2
GLUCOSE BLD-MCNC: 107 MG/DL (ref 74–99)
GLUCOSE BLD-MCNC: 121 MG/DL (ref 74–99)
GLUCOSE BLD-MCNC: 130 MG/DL (ref 74–99)
GLUCOSE BLD-MCNC: 141 MG/DL (ref 74–99)
GLUCOSE SERPL-MCNC: 67 MG/DL (ref 74–99)
HCT VFR BLD AUTO: 40.1 % (ref 37–54)
HGB BLD-MCNC: 13 G/DL (ref 12.5–16.5)
IMM GRANULOCYTES # BLD AUTO: <0.03 K/UL (ref 0–0.58)
IMM GRANULOCYTES NFR BLD: 0 % (ref 0–5)
LACTATE BLDV-SCNC: 1.1 MMOL/L (ref 0.5–2.2)
LYMPHOCYTES NFR BLD: 0.93 K/UL (ref 1.5–4)
LYMPHOCYTES RELATIVE PERCENT: 15 % (ref 20–42)
MCH RBC QN AUTO: 32.1 PG (ref 26–35)
MCHC RBC AUTO-ENTMCNC: 32.4 G/DL (ref 32–34.5)
MCV RBC AUTO: 99 FL (ref 80–99.9)
MONOCYTES NFR BLD: 0.96 K/UL (ref 0.1–0.95)
MONOCYTES NFR BLD: 15 % (ref 2–12)
NEUTROPHILS NFR BLD: 67 % (ref 43–80)
NEUTS SEG NFR BLD: 4.17 K/UL (ref 1.8–7.3)
PLATELET # BLD AUTO: 162 K/UL (ref 130–450)
PMV BLD AUTO: 10.4 FL (ref 7–12)
POTASSIUM SERPL-SCNC: 4.1 MMOL/L (ref 3.5–5)
RBC # BLD AUTO: 4.05 M/UL (ref 3.8–5.8)
SODIUM SERPL-SCNC: 140 MMOL/L (ref 132–146)
WBC OTHER # BLD: 6.2 K/UL (ref 4.5–11.5)

## 2023-12-04 PROCEDURE — 85025 COMPLETE CBC W/AUTO DIFF WBC: CPT

## 2023-12-04 PROCEDURE — 83605 ASSAY OF LACTIC ACID: CPT

## 2023-12-04 PROCEDURE — 97530 THERAPEUTIC ACTIVITIES: CPT

## 2023-12-04 PROCEDURE — 6370000000 HC RX 637 (ALT 250 FOR IP)

## 2023-12-04 PROCEDURE — 6360000002 HC RX W HCPCS: Performed by: INTERNAL MEDICINE

## 2023-12-04 PROCEDURE — 1200000000 HC SEMI PRIVATE

## 2023-12-04 PROCEDURE — 2580000003 HC RX 258: Performed by: STUDENT IN AN ORGANIZED HEALTH CARE EDUCATION/TRAINING PROGRAM

## 2023-12-04 PROCEDURE — 80048 BASIC METABOLIC PNL TOTAL CA: CPT

## 2023-12-04 PROCEDURE — 99232 SBSQ HOSP IP/OBS MODERATE 35: CPT | Performed by: PHYSICIAN ASSISTANT

## 2023-12-04 PROCEDURE — 99232 SBSQ HOSP IP/OBS MODERATE 35: CPT | Performed by: INTERNAL MEDICINE

## 2023-12-04 PROCEDURE — 2580000003 HC RX 258: Performed by: INTERNAL MEDICINE

## 2023-12-04 PROCEDURE — 82962 GLUCOSE BLOOD TEST: CPT

## 2023-12-04 PROCEDURE — 97535 SELF CARE MNGMENT TRAINING: CPT

## 2023-12-04 PROCEDURE — 6370000000 HC RX 637 (ALT 250 FOR IP): Performed by: STUDENT IN AN ORGANIZED HEALTH CARE EDUCATION/TRAINING PROGRAM

## 2023-12-04 PROCEDURE — 36415 COLL VENOUS BLD VENIPUNCTURE: CPT

## 2023-12-04 RX ORDER — QUETIAPINE FUMARATE 25 MG/1
25 TABLET, FILM COATED ORAL NIGHTLY PRN
Status: DISCONTINUED | OUTPATIENT
Start: 2023-12-04 | End: 2023-12-07

## 2023-12-04 RX ORDER — LANOLIN ALCOHOL/MO/W.PET/CERES
3 CREAM (GRAM) TOPICAL NIGHTLY PRN
Status: DISCONTINUED | OUTPATIENT
Start: 2023-12-04 | End: 2023-12-07

## 2023-12-04 RX ADMIN — WATER 1000 MG: 1 INJECTION INTRAMUSCULAR; INTRAVENOUS; SUBCUTANEOUS at 15:14

## 2023-12-04 RX ADMIN — SERTRALINE HYDROCHLORIDE 25 MG: 50 TABLET ORAL at 08:24

## 2023-12-04 RX ADMIN — APIXABAN 5 MG: 5 TABLET, FILM COATED ORAL at 08:24

## 2023-12-04 RX ADMIN — AMLODIPINE BESYLATE 5 MG: 5 TABLET ORAL at 08:24

## 2023-12-04 RX ADMIN — Medication 10 ML: at 22:21

## 2023-12-04 RX ADMIN — APIXABAN 5 MG: 5 TABLET, FILM COATED ORAL at 22:21

## 2023-12-04 NOTE — PLAN OF CARE
Problem: Safety - Adult  Goal: Free from fall injury  12/4/2023 1125 by Altagracia Lopez RN  Outcome: Progressing  12/4/2023 0923 by Altagracia Lopez RN  Outcome: Progressing  12/4/2023 0503 by Cary Xie RN  Outcome: Progressing     Problem: ABCDS Injury Assessment  Goal: Absence of physical injury  12/4/2023 1125 by Altagracia Lopez RN  Outcome: Progressing  12/4/2023 0923 by Altagracia Lopez RN  Outcome: Progressing  12/4/2023 0503 by Cary Xie RN  Outcome: Progressing     Problem: Pain  Goal: Verbalizes/displays adequate comfort level or baseline comfort level  12/4/2023 1125 by Altagracia Lopez RN  Outcome: Progressing  12/4/2023 0923 by Altagracia Lopez RN  Outcome: Progressing  12/4/2023 0503 by Cary Xie RN  Outcome: Progressing     Problem: Skin/Tissue Integrity  Goal: Absence of new skin breakdown  Description: 1. Monitor for areas of redness and/or skin breakdown  2. Assess vascular access sites hourly  3. Every 4-6 hours minimum:  Change oxygen saturation probe site  4. Every 4-6 hours:  If on nasal continuous positive airway pressure, respiratory therapy assess nares and determine need for appliance change or resting period.   12/4/2023 1125 by Altagracia Lopez RN  Outcome: Progressing  12/4/2023 0923 by Altagracia Lopez RN  Outcome: Progressing

## 2023-12-04 NOTE — PROGRESS NOTES
Occupational Therapy  OT BEDSIDE TREATMENT NOTE    [facility] [address] [place] [address]   [address] [place] [address]        GZTA:  Patient Name: Boris Erickson  MRN: [id number]  : 1930  Room: [address] [place] [address]     Per OT Eval:    Evaluating OT: Demetrius Andersen OTR/L [id number]  Referring Provider: Elliott Christianson MD  Specific Provider Orders: 23- OT eval & treat  Recommended Adaptive Equipment:  TBD      Diagnosis: AMS, delirium, UTI  Pertinent Medical History: dementia  Precautions:  Fall Risk, telesitter, recommend sitter due to pt debility but is actively trying to get OOB &  activating telesitter & RN aware     Assessment of current deficits   [x] Functional mobility           [x]ADLs           [x] Strength                  [x]Cognition   [x] Functional transfers         [x] IADLs         [x] Safety Awareness   [x]Endurance   [x] Fine Coordination                         [x] Balance      [x] Vision/perception   []Sensation     [x]Gross Motor Coordination             [x] ROM           [x] Delirium                   [x] Motor Control      OT PLAN OF CARE   OT POC based on physician orders, patient diagnosis and results of clinical assessment     Frequency/Duration:  1-3 days/wk for 2 weeks PRN   Specific OT Treatment to include:   * Instruction/training on adapted ADL techniques and AE recommendations to increase functional independence within precautions       * Training on energy conservation strategies, correct breathing pattern and techniques to improve independence/tolerance for self-care routine  * Functional transfer/mobility training/DME recommendations for increased independence, safety, and fall prevention  * Patient/Family education to increase follow through with safety techniques and functional independence  * Recommendation of environmental modifications for increased safety with functional transfers/mobility and ADLs  * Cognitive retraining/development of

## 2023-12-04 NOTE — CARE COORDINATION
Social Work/Case Management Transition of Care Planning Phineas Meckel Harris [phone]):  Per report and chart review, patient has a sitter due to increased agitation and confusion overnight. Patient remains on IV Rocephin q24. PT/OT scores noted to be 10/6. Met with patient's daughter, Maurice Real, at bedside. She is in agreement patient will need rehab before he can return home. She choiced 1. [facility] 2. [facility] 3. [facility] [place]. Referral information given to Adelaida of [facility]. Per Adelaida, none of the [facility] facilities are in network with patient's insurance. Referral information given to [address] [place] [address]. She will review. Await response. CM/SW will follow.   MICHEL Liz  12/4/2023

## 2023-12-04 NOTE — PROGRESS NOTES
normal---no rashes or lesions      Mental Status: sleepy. Requires repeated stimulation to attend. Oriented to self, place, knows his birthday and renu are coming up. Follows some simple commands.      Poor to fair attention/concentration      Speech: slow   Language: no aphasias- goes in between speaking english and [place]     Cranial Nerves:  I: smell    II: visual acuity     II: visual fields Full to confrontation   II: pupils SILVIA   III,VII: ptosis None   III,IV,VI: extraocular muscles  Full ROM   V: mastication Normal   V: facial light touch sensation  Normal   V,VII: corneal reflex     VII: facial muscle function - upper  Normal   VII: facial muscle function - lower Normal   VIII: hearing Normal   IX: soft palate elevation  Normal   IX,X: gag reflex    XI: trapezius strength  5/5   XI: sternocleidomastoid strength 5/5   XI: neck extension strength  5/5   XII: tongue strength  Normal     Motor:  5/5  strength bilaterally  Normal bulk and tone  No abnormal movements    DTR:   2+ throughout     Laboratory/Radiology:     CBC with Differential:    Lab Results   Component Value Date/Time    WBC 6.2 12/04/2023 05:10 AM    RBC 4.05 12/04/2023 05:10 AM    HGB 13.0 12/04/2023 05:10 AM    HCT 40.1 12/04/2023 05:10 AM     12/04/2023 05:10 AM    MCV 99.0 12/04/2023 05:10 AM    MCH 32.1 12/04/2023 05:10 AM    MCHC 32.4 12/04/2023 05:10 AM    RDW 14.1 12/04/2023 05:10 AM    SEGSPCT 40 01/17/2013 04:15 AM    LYMPHOPCT 15 12/04/2023 05:10 AM    MONOPCT 15 12/04/2023 05:10 AM    MYELOPCT 0.9 04/04/2021 03:04 PM    EOSPCT 4.5 08/08/2019 12:00 AM    BASOPCT 1 12/04/2023 05:10 AM    MONOSABS 0.96 12/04/2023 05:10 AM    LYMPHSABS 0.93 12/04/2023 05:10 AM    EOSABS 0.12 12/04/2023 05:10 AM    BASOSABS 0.03 12/04/2023 05:10 AM     CMP:    Lab Results   Component Value Date/Time     12/04/2023 05:10 AM    K 4.1 12/04/2023 05:10 AM    K 3.9 03/19/2021 12:43 PM     12/04/2023 05:10 AM    CO2 19 12/04/2023 05:10 AM    BUN 20 12/04/2023 05:10 AM    CREATININE 0.8 12/04/2023 05:10 AM    GFRAA >60 06/03/2022 03:45 PM    LABGLOM >60 12/04/2023 05:10 AM    GLUCOSE 67 12/04/2023 05:10 AM    GLUCOSE 115 03/05/2012 09:05 AM    PROT 7.6 11/30/2023 12:45 PM    LABALBU 4.1 11/30/2023 12:45 PM    LABALBU 4.0 03/05/2012 09:05 AM    CALCIUM 8.7 12/04/2023 05:10 AM    BILITOT 1.0 11/30/2023 12:45 PM    ALKPHOS 95 11/30/2023 12:45 PM    AST 24 11/30/2023 12:45 PM    ALT 10 11/30/2023 12:45 PM     Hepatic Function Panel:    Lab Results   Component Value Date/Time    ALKPHOS 95 11/30/2023 12:45 PM    ALT 10 11/30/2023 12:45 PM    AST 24 11/30/2023 12:45 PM    PROT 7.6 11/30/2023 12:45 PM    BILITOT 1.0 11/30/2023 12:45 PM    LABALBU 4.1 11/30/2023 12:45 PM    LABALBU 4.0 03/05/2012 09:05 AM     HgBA1c:    Lab Results   Component Value Date/Time    LABA1C 5.9 12/01/2023 05:29 AM     FLP:    Lab Results   Component Value Date/Time    TRIG 55 12/01/2023 05:29 AM    HDL 56 12/01/2023 05:29 AM    LDLCALC 73 08/08/2019 12:00 AM    LABVLDL 11 01/25/2018 03:00 PM     CT head w/o contrast did not show an acute bleed or mass lesion and did showed periventricular leukomalacia as expected for his age. Repeat CTH similar to previous no acute findings. CUS  IMPRESSION:  The right internal carotid artery demonstrates 0-50% stenosis. The left internal carotid artery demonstrates 0-50% stenosis. Bilateral vertebral arteries are patent with flow in the normal direction. Patient became agitated during the exam, somewhat limiting evaluation  especially on the left side.     All labs and imaging studies reviewed independently today     Assessment:     AMS in the setting of delirium 2/2 to UTI  --- brain imaging negative for acute stroke     Plan:   PT/OT evals   WINIFRED pending   Okay to d/c from neuro POV    Neuro will sign off- please call with questions       SUZANNA Harmon  11:47 AM  12/4/2023

## 2023-12-04 NOTE — PLAN OF CARE
Problem: Safety - Adult  Goal: Free from fall injury  12/4/2023 0923 by Michelle Duarte RN  Outcome: Progressing  12/4/2023 0503 by Vani Garsia RN  Outcome: Progressing     Problem: ABCDS Injury Assessment  Goal: Absence of physical injury  12/4/2023 0923 by Michelle Duarte RN  Outcome: Progressing  12/4/2023 0503 by Vani Garsia RN  Outcome: Progressing     Problem: Pain  Goal: Verbalizes/displays adequate comfort level or baseline comfort level  12/4/2023 0923 by Michelle Duarte RN  Outcome: Progressing  12/4/2023 0503 by Vani Garsia RN  Outcome: Progressing     Problem: Skin/Tissue Integrity  Goal: Absence of new skin breakdown  Description: 1. Monitor for areas of redness and/or skin breakdown  2. Assess vascular access sites hourly  3. Every 4-6 hours minimum:  Change oxygen saturation probe site  4. Every 4-6 hours:  If on nasal continuous positive airway pressure, respiratory therapy assess nares and determine need for appliance change or resting period.   Outcome: Progressing

## 2023-12-05 LAB
ANION GAP SERPL CALCULATED.3IONS-SCNC: 15 MMOL/L (ref 7–16)
BASOPHILS # BLD: 0.03 K/UL (ref 0–0.2)
BASOPHILS NFR BLD: 1 % (ref 0–2)
BUN SERPL-MCNC: 18 MG/DL (ref 6–23)
CALCIUM SERPL-MCNC: 8.5 MG/DL (ref 8.6–10.2)
CHLORIDE SERPL-SCNC: 103 MMOL/L (ref 98–107)
CO2 SERPL-SCNC: 22 MMOL/L (ref 22–29)
CREAT SERPL-MCNC: 0.8 MG/DL (ref 0.7–1.2)
EOSINOPHIL # BLD: 0.14 K/UL (ref 0.05–0.5)
EOSINOPHILS RELATIVE PERCENT: 2 % (ref 0–6)
ERYTHROCYTE [DISTWIDTH] IN BLOOD BY AUTOMATED COUNT: 14.2 % (ref 11.5–15)
GFR SERPL CREATININE-BSD FRML MDRD: >60 ML/MIN/1.73M2
GLUCOSE BLD-MCNC: 104 MG/DL (ref 74–99)
GLUCOSE BLD-MCNC: 160 MG/DL (ref 74–99)
GLUCOSE BLD-MCNC: 86 MG/DL (ref 74–99)
GLUCOSE BLD-MCNC: 96 MG/DL (ref 74–99)
GLUCOSE SERPL-MCNC: 92 MG/DL (ref 74–99)
HCT VFR BLD AUTO: 38.1 % (ref 37–54)
HGB BLD-MCNC: 12.6 G/DL (ref 12.5–16.5)
IMM GRANULOCYTES # BLD AUTO: <0.03 K/UL (ref 0–0.58)
IMM GRANULOCYTES NFR BLD: 0 % (ref 0–5)
LYMPHOCYTES NFR BLD: 0.99 K/UL (ref 1.5–4)
LYMPHOCYTES RELATIVE PERCENT: 16 % (ref 20–42)
MCH RBC QN AUTO: 32.2 PG (ref 26–35)
MCHC RBC AUTO-ENTMCNC: 33.1 G/DL (ref 32–34.5)
MCV RBC AUTO: 97.4 FL (ref 80–99.9)
METHYLMALONATE SERPL-SCNC: 0.19 UMOL/L (ref 0–0.4)
MICROORGANISM SPEC CULT: NORMAL
MICROORGANISM SPEC CULT: NORMAL
MONOCYTES NFR BLD: 1.25 K/UL (ref 0.1–0.95)
MONOCYTES NFR BLD: 20 % (ref 2–12)
NEUTROPHILS NFR BLD: 61 % (ref 43–80)
NEUTS SEG NFR BLD: 3.76 K/UL (ref 1.8–7.3)
PLATELET # BLD AUTO: 163 K/UL (ref 130–450)
PMV BLD AUTO: 10.1 FL (ref 7–12)
POTASSIUM SERPL-SCNC: 4 MMOL/L (ref 3.5–5)
RBC # BLD AUTO: 3.91 M/UL (ref 3.8–5.8)
SERVICE CMNT-IMP: NORMAL
SERVICE CMNT-IMP: NORMAL
SODIUM SERPL-SCNC: 140 MMOL/L (ref 132–146)
SPECIMEN DESCRIPTION: NORMAL
SPECIMEN DESCRIPTION: NORMAL
WBC OTHER # BLD: 6.2 K/UL (ref 4.5–11.5)

## 2023-12-05 PROCEDURE — 82962 GLUCOSE BLOOD TEST: CPT

## 2023-12-05 PROCEDURE — 99232 SBSQ HOSP IP/OBS MODERATE 35: CPT | Performed by: INTERNAL MEDICINE

## 2023-12-05 PROCEDURE — 6370000000 HC RX 637 (ALT 250 FOR IP): Performed by: STUDENT IN AN ORGANIZED HEALTH CARE EDUCATION/TRAINING PROGRAM

## 2023-12-05 PROCEDURE — 1200000000 HC SEMI PRIVATE

## 2023-12-05 PROCEDURE — 6370000000 HC RX 637 (ALT 250 FOR IP): Performed by: PHYSICIAN ASSISTANT

## 2023-12-05 PROCEDURE — 2580000003 HC RX 258: Performed by: INTERNAL MEDICINE

## 2023-12-05 PROCEDURE — 97530 THERAPEUTIC ACTIVITIES: CPT

## 2023-12-05 PROCEDURE — 6370000000 HC RX 637 (ALT 250 FOR IP): Performed by: INTERNAL MEDICINE

## 2023-12-05 PROCEDURE — 6360000002 HC RX W HCPCS: Performed by: INTERNAL MEDICINE

## 2023-12-05 PROCEDURE — 2580000003 HC RX 258: Performed by: STUDENT IN AN ORGANIZED HEALTH CARE EDUCATION/TRAINING PROGRAM

## 2023-12-05 PROCEDURE — 80048 BASIC METABOLIC PNL TOTAL CA: CPT

## 2023-12-05 PROCEDURE — 36415 COLL VENOUS BLD VENIPUNCTURE: CPT

## 2023-12-05 PROCEDURE — 6370000000 HC RX 637 (ALT 250 FOR IP)

## 2023-12-05 PROCEDURE — 85025 COMPLETE CBC W/AUTO DIFF WBC: CPT

## 2023-12-05 RX ADMIN — APIXABAN 5 MG: 5 TABLET, FILM COATED ORAL at 08:29

## 2023-12-05 RX ADMIN — APIXABAN 5 MG: 5 TABLET, FILM COATED ORAL at 22:44

## 2023-12-05 RX ADMIN — QUETIAPINE FUMARATE 25 MG: 25 TABLET ORAL at 22:44

## 2023-12-05 RX ADMIN — WATER 1000 MG: 1 INJECTION INTRAMUSCULAR; INTRAVENOUS; SUBCUTANEOUS at 15:47

## 2023-12-05 RX ADMIN — Medication 10 ML: at 08:30

## 2023-12-05 RX ADMIN — Medication 10 ML: at 22:45

## 2023-12-05 RX ADMIN — MELATONIN 3 MG ORAL TABLET 3 MG: 3 TABLET ORAL at 22:44

## 2023-12-05 RX ADMIN — AMLODIPINE BESYLATE 5 MG: 5 TABLET ORAL at 08:30

## 2023-12-05 RX ADMIN — SERTRALINE HYDROCHLORIDE 25 MG: 50 TABLET ORAL at 08:29

## 2023-12-05 NOTE — PROGRESS NOTES
Occupational Therapy  OT BEDSIDE TREATMENT NOTE    [facility] [address] [place] [address]   [address] [place] [address]        MTPJ:  Patient Name: Yen Crouch  MRN: [id number]  : 1930  Room: [address] [place] [address]     Per OT Eval:    Evaluating OT: Rita Cobos OTR/L [id number]  Referring Provider: Nabeel Chavez MD  Specific Provider Orders: 23- OT eval & treat  Recommended Adaptive Equipment:  TBD      Diagnosis: AMS, delirium, UTI  Pertinent Medical History: dementia  Precautions:  Fall Risk, telesitter, recommend sitter due to pt debility but is actively trying to get OOB &  activating telesitter & RN aware     Assessment of current deficits   [x] Functional mobility           [x]ADLs           [x] Strength                  [x]Cognition   [x] Functional transfers         [x] IADLs         [x] Safety Awareness   [x]Endurance   [x] Fine Coordination                         [x] Balance      [x] Vision/perception   []Sensation     [x]Gross Motor Coordination             [x] ROM           [x] Delirium                   [x] Motor Control      OT PLAN OF CARE   OT POC based on physician orders, patient diagnosis and results of clinical assessment     Frequency/Duration:  1-3 days/wk for 2 weeks PRN   Specific OT Treatment to include:   * Instruction/training on adapted ADL techniques and AE recommendations to increase functional independence within precautions       * Training on energy conservation strategies, correct breathing pattern and techniques to improve independence/tolerance for self-care routine  * Functional transfer/mobility training/DME recommendations for increased independence, safety, and fall prevention  * Patient/Family education to increase follow through with safety techniques and functional independence  * Recommendation of environmental modifications for increased safety with functional transfers/mobility and ADLs  * Cognitive retraining/development of Hearing: [place]  Sensation:  u/a to assess due to cognitive deficits  Tone:  WFL  Edema: none noted        Comments/Treatment/Education: Upon arrival pt supine in bed, agreeable to therapy, speaking with nursing okaying pt to be seen this session, daughter present. Pt was educated on role of OT, goals to be reached, importance of OOB activity, safety and hand placement with transfers, safety/balance and walker management with functional mobility, balance/posture seated unsupported EOB and when standing upright, and compensatory strategies to assist with ADL tasks. At end of session, pt seated upright in chair, nursing aware, daughter still present, all lines and tubes intact, call light within reach. Pt has made slow progress towards set goals.    Continue with current plan of care      Treatment Time In: 10:00am       Treatment Time Out: 10:24am                Treatment Charges: Mins Units   Ther Ex  87305     Manual Therapy 83570     Thera Activities 44282 24 2   ADL/Home Mgt 86147     Neuro Re-ed 16598     Group Therapy      Orthotic manage/training  01418     Non-Billable Time     Total Timed Treatment 24 2        Miguelina Brooks DUMONT/L [id number]

## 2023-12-05 NOTE — CARE COORDINATION
Social Work/Case Management Transition of Care Planning (Lawanda Shetty [place] [phone]):  Per report and chart review, patient remains on IV Rocephin for UTI. PT/OT scores noted to be 10/9. Sitter is at bedside. Neurology has signed off. Daughter is in agreement patient will need SUK[facility]AR placement. Per Oseas Albarado of [facility], they will follow but they will not have a bed available until the end of the week. Patient must be sitter free x24 hours. Met with daughter at bedside. Updated her on status with [facility]. Alternate choices are 1. [address] 2. [address] [facility]  3. [facility]. Referral information given to [address] [place]. Await response. CM/SW will follow.   MICHEL Noonan  12/5/2023

## 2023-12-05 NOTE — PLAN OF CARE
Problem: Safety - Adult  Goal: Free from fall injury  12/5/2023 0911 by Karthik Hernández RN  Outcome: Progressing  12/4/2023 2054 by Iesha Kumar RN  Outcome: Progressing     Problem: ABCDS Injury Assessment  Goal: Absence of physical injury  12/5/2023 0911 by Karthik Hernández RN  Outcome: Progressing  12/4/2023 2054 by Iesha Kumar RN  Outcome: Progressing     Problem: Pain  Goal: Verbalizes/displays adequate comfort level or baseline comfort level  12/5/2023 0911 by Karthik Hernández RN  Outcome: Progressing  12/4/2023 2054 by Iesha Kumar RN  Outcome: Progressing     Problem: Skin/Tissue Integrity  Goal: Absence of new skin breakdown  Description: 1. Monitor for areas of redness and/or skin breakdown  2. Assess vascular access sites hourly  3. Every 4-6 hours minimum:  Change oxygen saturation probe site  4. Every 4-6 hours:  If on nasal continuous positive airway pressure, respiratory therapy assess nares and determine need for appliance change or resting period.   12/5/2023 0911 by Karthik Hernández RN  Outcome: Progressing  12/4/2023 2054 by Iesha Kumar RN  Outcome: Progressing

## 2023-12-06 ENCOUNTER — APPOINTMENT (OUTPATIENT)
Dept: GENERAL RADIOLOGY | Age: 88
End: 2023-12-06
Payer: MEDICARE

## 2023-12-06 LAB
ANION GAP SERPL CALCULATED.3IONS-SCNC: 17 MMOL/L (ref 7–16)
BASOPHILS # BLD: 0.02 K/UL (ref 0–0.2)
BASOPHILS NFR BLD: 0 % (ref 0–2)
BUN SERPL-MCNC: 18 MG/DL (ref 6–23)
CALCIUM SERPL-MCNC: 8.7 MG/DL (ref 8.6–10.2)
CHLORIDE SERPL-SCNC: 101 MMOL/L (ref 98–107)
CO2 SERPL-SCNC: 21 MMOL/L (ref 22–29)
CREAT SERPL-MCNC: 0.8 MG/DL (ref 0.7–1.2)
EOSINOPHIL # BLD: 0.23 K/UL (ref 0.05–0.5)
EOSINOPHILS RELATIVE PERCENT: 4 % (ref 0–6)
ERYTHROCYTE [DISTWIDTH] IN BLOOD BY AUTOMATED COUNT: 14 % (ref 11.5–15)
GFR SERPL CREATININE-BSD FRML MDRD: >60 ML/MIN/1.73M2
GLUCOSE BLD-MCNC: 100 MG/DL (ref 74–99)
GLUCOSE BLD-MCNC: 91 MG/DL (ref 74–99)
GLUCOSE BLD-MCNC: 95 MG/DL (ref 74–99)
GLUCOSE SERPL-MCNC: 79 MG/DL (ref 74–99)
HCT VFR BLD AUTO: 38.7 % (ref 37–54)
HGB BLD-MCNC: 12.8 G/DL (ref 12.5–16.5)
IMM GRANULOCYTES # BLD AUTO: <0.03 K/UL (ref 0–0.58)
IMM GRANULOCYTES NFR BLD: 0 % (ref 0–5)
LYMPHOCYTES NFR BLD: 0.85 K/UL (ref 1.5–4)
LYMPHOCYTES RELATIVE PERCENT: 14 % (ref 20–42)
MCH RBC QN AUTO: 32.2 PG (ref 26–35)
MCHC RBC AUTO-ENTMCNC: 33.1 G/DL (ref 32–34.5)
MCV RBC AUTO: 97.5 FL (ref 80–99.9)
MONOCYTES NFR BLD: 1.47 K/UL (ref 0.1–0.95)
MONOCYTES NFR BLD: 24 % (ref 2–12)
NEUTROPHILS NFR BLD: 57 % (ref 43–80)
NEUTS SEG NFR BLD: 3.47 K/UL (ref 1.8–7.3)
PLATELET # BLD AUTO: 170 K/UL (ref 130–450)
PMV BLD AUTO: 10.7 FL (ref 7–12)
POTASSIUM SERPL-SCNC: 3.9 MMOL/L (ref 3.5–5)
RBC # BLD AUTO: 3.97 M/UL (ref 3.8–5.8)
SODIUM SERPL-SCNC: 139 MMOL/L (ref 132–146)
WBC OTHER # BLD: 6.1 K/UL (ref 4.5–11.5)

## 2023-12-06 PROCEDURE — 2580000003 HC RX 258: Performed by: INTERNAL MEDICINE

## 2023-12-06 PROCEDURE — 2580000003 HC RX 258: Performed by: STUDENT IN AN ORGANIZED HEALTH CARE EDUCATION/TRAINING PROGRAM

## 2023-12-06 PROCEDURE — 80048 BASIC METABOLIC PNL TOTAL CA: CPT

## 2023-12-06 PROCEDURE — 1200000000 HC SEMI PRIVATE

## 2023-12-06 PROCEDURE — 99233 SBSQ HOSP IP/OBS HIGH 50: CPT | Performed by: INTERNAL MEDICINE

## 2023-12-06 PROCEDURE — 85025 COMPLETE CBC W/AUTO DIFF WBC: CPT

## 2023-12-06 PROCEDURE — 6370000000 HC RX 637 (ALT 250 FOR IP)

## 2023-12-06 PROCEDURE — 36415 COLL VENOUS BLD VENIPUNCTURE: CPT

## 2023-12-06 PROCEDURE — 6370000000 HC RX 637 (ALT 250 FOR IP): Performed by: STUDENT IN AN ORGANIZED HEALTH CARE EDUCATION/TRAINING PROGRAM

## 2023-12-06 PROCEDURE — 82962 GLUCOSE BLOOD TEST: CPT

## 2023-12-06 PROCEDURE — 71045 X-RAY EXAM CHEST 1 VIEW: CPT

## 2023-12-06 RX ORDER — SODIUM CHLORIDE 9 MG/ML
INJECTION, SOLUTION INTRAVENOUS CONTINUOUS
Status: ACTIVE | OUTPATIENT
Start: 2023-12-06 | End: 2023-12-07

## 2023-12-06 RX ADMIN — AMLODIPINE BESYLATE 5 MG: 5 TABLET ORAL at 09:42

## 2023-12-06 RX ADMIN — APIXABAN 5 MG: 5 TABLET, FILM COATED ORAL at 09:42

## 2023-12-06 RX ADMIN — ACETAMINOPHEN 650 MG: 325 TABLET ORAL at 09:42

## 2023-12-06 RX ADMIN — Medication 10 ML: at 09:54

## 2023-12-06 RX ADMIN — SERTRALINE HYDROCHLORIDE 25 MG: 50 TABLET ORAL at 09:42

## 2023-12-06 RX ADMIN — SODIUM CHLORIDE: 9 INJECTION, SOLUTION INTRAVENOUS at 18:16

## 2023-12-06 RX ADMIN — APIXABAN 5 MG: 5 TABLET, FILM COATED ORAL at 21:44

## 2023-12-06 NOTE — CARE COORDINATION
Social Work/Case Management Transition of Care Planning (Kat Costa [place] [phone]):  Per report and chart review, IV Rocephin has been completed. PT to re-eval.  Updated OT score noted to be 11. Discharge plan is to [facility]. [facility] is following. Spoke with Rocio Kinney of [facility]. She indicated she does not anticipate at bed until possibly the weekend. Referral made to [facility] for [address] However, they are out of network. Phone call to Mildred Shaw at [facility]. They are out of network. Only able to leave a message at this time. Phone call to [address] at [facility]. They do not have any beds available. Spoke with daughter for additional choices 1. [address] [place] [address] 2. [facility] 3. [facility] 4. [facility] 5. [facility]. Referral information provided to  [address] [place] [address]. CM/SW will follow.   MICHEL Valdez  12/6/2023

## 2023-12-06 NOTE — PLAN OF CARE
Problem: Safety - Adult  Goal: Free from fall injury  12/6/2023 1204 by Anival Sung RN  Outcome: Progressing  12/6/2023 0307 by Bao Box RN  Outcome: Progressing     Problem: ABCDS Injury Assessment  Goal: Absence of physical injury  12/6/2023 1204 by Anival Sung RN  Outcome: Progressing  12/6/2023 0307 by Bao Box RN  Outcome: Progressing     Problem: Pain  Goal: Verbalizes/displays adequate comfort level or baseline comfort level  12/6/2023 1204 by Anival Sung RN  Outcome: Progressing  12/6/2023 0307 by Bao Box RN  Outcome: Progressing

## 2023-12-07 ENCOUNTER — APPOINTMENT (OUTPATIENT)
Dept: GENERAL RADIOLOGY | Age: 88
End: 2023-12-07
Payer: MEDICARE

## 2023-12-07 LAB
AMMONIA PLAS-SCNC: 22 UMOL/L (ref 16–60)
ANION GAP SERPL CALCULATED.3IONS-SCNC: 14 MMOL/L (ref 7–16)
B.E.: 1.2 MMOL/L (ref -3–3)
BASOPHILS # BLD: 0.02 K/UL (ref 0–0.2)
BASOPHILS NFR BLD: 0 % (ref 0–2)
BUN SERPL-MCNC: 20 MG/DL (ref 6–23)
CALCIUM SERPL-MCNC: 8.4 MG/DL (ref 8.6–10.2)
CHLORIDE SERPL-SCNC: 104 MMOL/L (ref 98–107)
CO2 SERPL-SCNC: 20 MMOL/L (ref 22–29)
COHB: 1.2 % (ref 0–1.5)
CREAT SERPL-MCNC: 0.8 MG/DL (ref 0.7–1.2)
CRITICAL: NORMAL
DATE ANALYZED: NORMAL
DATE OF COLLECTION: NORMAL
EOSINOPHIL # BLD: 0.47 K/UL (ref 0.05–0.5)
EOSINOPHILS RELATIVE PERCENT: 8 % (ref 0–6)
ERYTHROCYTE [DISTWIDTH] IN BLOOD BY AUTOMATED COUNT: 13.8 % (ref 11.5–15)
GFR SERPL CREATININE-BSD FRML MDRD: >60 ML/MIN/1.73M2
GLUCOSE BLD-MCNC: 75 MG/DL (ref 74–99)
GLUCOSE BLD-MCNC: 87 MG/DL (ref 74–99)
GLUCOSE BLD-MCNC: 99 MG/DL (ref 74–99)
GLUCOSE BLD-MCNC: 99 MG/DL (ref 74–99)
GLUCOSE SERPL-MCNC: 79 MG/DL (ref 74–99)
HCO3: 25.5 MMOL/L (ref 22–26)
HCT VFR BLD AUTO: 40 % (ref 37–54)
HGB BLD-MCNC: 12.7 G/DL (ref 12.5–16.5)
HHB: 4.5 % (ref 0–5)
IMM GRANULOCYTES # BLD AUTO: 0.04 K/UL (ref 0–0.58)
IMM GRANULOCYTES NFR BLD: 1 % (ref 0–5)
LAB: NORMAL
LYMPHOCYTES NFR BLD: 1.19 K/UL (ref 1.5–4)
LYMPHOCYTES RELATIVE PERCENT: 20 % (ref 20–42)
Lab: 1917
MCH RBC QN AUTO: 31.8 PG (ref 26–35)
MCHC RBC AUTO-ENTMCNC: 31.8 G/DL (ref 32–34.5)
MCV RBC AUTO: 100.3 FL (ref 80–99.9)
METHB: 0.2 % (ref 0–1.5)
MODE: NORMAL
MONOCYTES NFR BLD: 1.15 K/UL (ref 0.1–0.95)
MONOCYTES NFR BLD: 19 % (ref 2–12)
NEUTROPHILS NFR BLD: 53 % (ref 43–80)
NEUTS SEG NFR BLD: 3.2 K/UL (ref 1.8–7.3)
O2 CONTENT: 17.9 ML/DL
O2 SATURATION: 95.4 % (ref 92–98.5)
O2HB: 94.1 % (ref 94–97)
OPERATOR ID: 659
PATIENT TEMP: 37 C
PCO2: 39.2 MMHG (ref 35–45)
PH BLOOD GAS: 7.43 (ref 7.35–7.45)
PLATELET # BLD AUTO: 160 K/UL (ref 130–450)
PMV BLD AUTO: 10.1 FL (ref 7–12)
PO2: 75.5 MMHG (ref 75–100)
POTASSIUM SERPL-SCNC: 4.4 MMOL/L (ref 3.5–5)
RBC # BLD AUTO: 3.99 M/UL (ref 3.8–5.8)
SODIUM SERPL-SCNC: 138 MMOL/L (ref 132–146)
SOURCE, BLOOD GAS: NORMAL
THB: 13.5 G/DL (ref 11.5–16.5)
TIME ANALYZED: 1925
WBC OTHER # BLD: 6.1 K/UL (ref 4.5–11.5)

## 2023-12-07 PROCEDURE — 2580000003 HC RX 258: Performed by: INTERNAL MEDICINE

## 2023-12-07 PROCEDURE — 82962 GLUCOSE BLOOD TEST: CPT

## 2023-12-07 PROCEDURE — 82805 BLOOD GASES W/O2 SATURATION: CPT

## 2023-12-07 PROCEDURE — 85025 COMPLETE CBC W/AUTO DIFF WBC: CPT

## 2023-12-07 PROCEDURE — 6370000000 HC RX 637 (ALT 250 FOR IP): Performed by: STUDENT IN AN ORGANIZED HEALTH CARE EDUCATION/TRAINING PROGRAM

## 2023-12-07 PROCEDURE — 6370000000 HC RX 637 (ALT 250 FOR IP)

## 2023-12-07 PROCEDURE — 36415 COLL VENOUS BLD VENIPUNCTURE: CPT

## 2023-12-07 PROCEDURE — 2500000003 HC RX 250 WO HCPCS: Performed by: PHYSICIAN ASSISTANT

## 2023-12-07 PROCEDURE — 92610 EVALUATE SWALLOWING FUNCTION: CPT | Performed by: SPEECH-LANGUAGE PATHOLOGIST

## 2023-12-07 PROCEDURE — 74230 X-RAY XM SWLNG FUNCJ C+: CPT

## 2023-12-07 PROCEDURE — 99233 SBSQ HOSP IP/OBS HIGH 50: CPT | Performed by: INTERNAL MEDICINE

## 2023-12-07 PROCEDURE — 82140 ASSAY OF AMMONIA: CPT

## 2023-12-07 PROCEDURE — 1200000000 HC SEMI PRIVATE

## 2023-12-07 PROCEDURE — 2580000003 HC RX 258: Performed by: STUDENT IN AN ORGANIZED HEALTH CARE EDUCATION/TRAINING PROGRAM

## 2023-12-07 PROCEDURE — 80048 BASIC METABOLIC PNL TOTAL CA: CPT

## 2023-12-07 PROCEDURE — 99223 1ST HOSP IP/OBS HIGH 75: CPT

## 2023-12-07 PROCEDURE — 92526 ORAL FUNCTION THERAPY: CPT | Performed by: SPEECH-LANGUAGE PATHOLOGIST

## 2023-12-07 PROCEDURE — 6360000002 HC RX W HCPCS: Performed by: INTERNAL MEDICINE

## 2023-12-07 PROCEDURE — 92611 MOTION FLUOROSCOPY/SWALLOW: CPT | Performed by: SPEECH-LANGUAGE PATHOLOGIST

## 2023-12-07 PROCEDURE — 36600 WITHDRAWAL OF ARTERIAL BLOOD: CPT

## 2023-12-07 PROCEDURE — 81001 URINALYSIS AUTO W/SCOPE: CPT

## 2023-12-07 RX ORDER — SODIUM CHLORIDE 9 MG/ML
INJECTION, SOLUTION INTRAVENOUS CONTINUOUS
Status: DISCONTINUED | OUTPATIENT
Start: 2023-12-07 | End: 2023-12-07

## 2023-12-07 RX ORDER — SODIUM CHLORIDE 9 MG/ML
INJECTION, SOLUTION INTRAVENOUS CONTINUOUS
Status: ACTIVE | OUTPATIENT
Start: 2023-12-07

## 2023-12-07 RX ADMIN — APIXABAN 5 MG: 5 TABLET, FILM COATED ORAL at 20:38

## 2023-12-07 RX ADMIN — Medication 10 ML: at 08:20

## 2023-12-07 RX ADMIN — SERTRALINE HYDROCHLORIDE 25 MG: 50 TABLET ORAL at 08:19

## 2023-12-07 RX ADMIN — AMLODIPINE BESYLATE 5 MG: 5 TABLET ORAL at 08:20

## 2023-12-07 RX ADMIN — BARIUM SULFATE 15 ML: 400 SUSPENSION ORAL at 15:15

## 2023-12-07 RX ADMIN — WATER 1000 MG: 1 INJECTION INTRAMUSCULAR; INTRAVENOUS; SUBCUTANEOUS at 18:28

## 2023-12-07 RX ADMIN — SODIUM CHLORIDE: 9 INJECTION, SOLUTION INTRAVENOUS at 18:25

## 2023-12-07 RX ADMIN — ACETAMINOPHEN 650 MG: 325 TABLET ORAL at 08:19

## 2023-12-07 RX ADMIN — BARIUM SULFATE 30 ML: 400 PASTE ORAL at 15:15

## 2023-12-07 RX ADMIN — APIXABAN 5 MG: 5 TABLET, FILM COATED ORAL at 08:19

## 2023-12-07 NOTE — PROGRESS NOTES
Palliative Medicine Social Work     Patient Name: Dewight Fothergill    Age: 80 y.o. Marital Status: , wife  in January     Status: no    Next of [facility] [phone]    Additional Support: private pay care givers    Minor Children: no    Advanced Directives: none on file    Confirm Code Status: dnrcca    Current Goals of Care: Hilton Sheppard wants to taker her father home with care givers, she states she also took care of her mother at home before she passed. Mental Health History: Depression/anxiety    Substance Abuse:no    Indications of Abuse/Neglect: no    Financial Concerns: no    Living Situation: lives alone with private care givers present most of the day. Daughter lives nearby and coordinates daily needs. Physical Care Needs Met: pt resting in bed, daughter present    Assessment: 81 yo  male with a history of Atrial fibrillation,Cancer, Hyperlipidemia, Hypertension, and Syncope and collapse. Patient presented to the hospital with chief complaint of confusion for 3 days. Pt being treated for uti, acute delirium. Daughter/nok  at bedside, she would like to discuss caring for pt at home.  Reviewed with NP.

## 2023-12-07 NOTE — PROGRESS NOTES
SPEECH/LANGUAGE PATHOLOGY  CLINICAL ASSESSMENT OF SWALLOWING FUNCTION   and PLAN OF CARE    PATIENT NAME:  Andrea Domínguez  (male)     MRN:  [id number]    :  1930  (80 y.o.)  STATUS:  Inpatient: Room 8422/8422-B    TODAY'S DATE:  23 1500    SLP swallowing-dysphagia evaluation and treatment  Start:  23 1500,   End:  23 1500,   ONE TIME,   Standing Count:  1 Occurrences,   R       Jennifer Rice DO  REASON FOR REFERRAL:     gurgling respirations      EVALUATING THERAPIST: Glenn Nguyen, SLP                 RESULTS:    DYSPHAGIA DIAGNOSIS:   Clinical indicators of pharyngeal phase dysphagia       DIET RECOMMENDATIONS:  no diet recommendations available until MBSS, defer to physician regarding PO intake     Pt with history of dysphagia during previous hospitalizations, however dysphagia did resolve both times prior to discharge. FEEDING RECOMMENDATIONS:     Assistance level:  Not applicable      Compensatory strategies recommended: Not applicable      Discussed recommendations with nursing and/or faxed report to referring provider: Yes    SPEECH THERAPY  PLAN OF CARE   The dysphagia POC is established based on physician order, dysphagia diagnosis and results of clinical assessment     Will establish POC once MBSS is completed. Conditions Requiring Skilled Therapeutic Intervention for dysphagia:    Patient is performing below functional baseline d/t  current acute condition, respiratory compromise, multiple medications, and/or increased dependency upon caregivers.   Wet vocal quality during PO intake  Throat clearing during PO intake   Coughing during PO intake      Specific dysphagia interventions to include:     MBSS to fully assess oropharyngeal swallow function and to assist in determining the least restrictive PO diet to maintain adequate nutrition/hydration     Specific instructions for next treatment:  MBSS to be completed  Patient Treatment Goals:    Short bronchitis    Acute respiratory failure with hypoxia (HCC)    Closed fracture of ramus of right pubis (HCC)    Back pain    Closed compression fracture of L1 lumbar vertebra    Sacral fracture, closed (HCC)    Left displaced femoral neck fracture (HCC)    Hemoptysis    Acute bacterial bronchitis    Delirium    Acute cystitis    Altered mental status         CPT code:  31851  bedside swallow eval    INTERVENTION  CPT Code: 67476  dysphagia tx    Speech Pathologist (SLP) completed education with the patient/family regarding type of swallowing impairment. Reviewed current solid/liquid consistency diet recommendations and discussed compensatory strategies to ensure safe PO intake. Reviewed aspiration precautions. Encouraged patient and/or family to engage SLP in unstructured Q&A session relative to identified deficit areas; indicated understanding of all information provided via satisfactory verbal response.

## 2023-12-07 NOTE — PLAN OF CARE
Problem: Safety - Adult  Goal: Free from fall injury  12/7/2023 1349 by Ghulam Hood RN  Outcome: Progressing  12/7/2023 6078 by Kaci Howell RN  Outcome: Progressing     Problem: ABCDS Injury Assessment  Goal: Absence of physical injury  12/7/2023 1349 by Ghulam Hood RN  Outcome: Progressing  12/7/2023 9761 by Kaci Howell RN  Outcome: Progressing     Problem: Pain  Goal: Verbalizes/displays adequate comfort level or baseline comfort level  12/7/2023 1349 by Ghulam Hood RN  Outcome: Progressing  12/7/2023 5608 by Kaci Howell RN  Outcome: Progressing

## 2023-12-07 NOTE — CARE COORDINATION
Social Work/Case Management Transition of Care Planning (Tan Holguin [place] [phone]):  Per report and chart review, sitter has been discontinued. Palliative care has been consulted. Oral intake has been poor. IVF were started. Speech was consulted as patient was noted to have gurgling respirations. Per ST there are indications of pharyngeal phase dysphagia. Patient is NPO pending MBS. [facility] is out of network. Referral information provided to [address] [place] [address]. She indicated they are in network with insurance. She will review and let SW know tomorrow. CM/SW will follow. Met with patient's daughter at bedside. She does not want to consider [facility] any longer. Provided daughter with list of private duty care options as well. Daughter requested SW check with [facility] again. Per Kacey Sons, they do not have beds available. CM/SW will follow.   MICHEL Garrett  12/7/2023

## 2023-12-07 NOTE — PROGRESS NOTES
Occupational Therapy  OT BEDSIDE TREATMENT NOTE    [facility] [address] [place] [address]        RUKHSANA:  Patient Name: Zheng Ayala  MRN: [id number]  : 1930  Room: [address] [place] [address]     Attempted to see pt this afternoon, pt currently off unit. Will attempt at a later date/time.         [facility] [address] DUMONT/L [id number]

## 2023-12-07 NOTE — PROGRESS NOTES
Comprehensive Nutrition Assessment    Type and Reason for Visit:  RD Nutrition Re-Screen/LOS      Nutrition Assessment:    RD re-screen negative as pt w/ mostly % oral intakes recorded at meals ; no wounds noted per doc flow; MST score of 0; no indication for ONS at this time; will follow per policy/consult. Current Nutrition Intake & Therapies:    Average Meal Intake: % (most)  Average Supplements Intake: None Ordered  ADULT DIET;  Regular      Lawerence EMELY Trejo, LD  Contact: [phone]

## 2023-12-07 NOTE — PROGRESS NOTES
SPEECH/LANGUAGE PATHOLOGY  VIDEOFLUOROSCOPIC STUDY OF SWALLOWING (MBS)   and PLAN OF CARE    PATIENT NAME:  Chico Miles  (male)     MRN:  [id number]    :  1930  (80 y.o.)  STATUS:  Inpatient: Room 8422/8422-B    TODAY'S DATE:  2023  REFERRING PROVIDER:   Dr. Garcia Bookbinder: FL modified barium swallow with video  Date of order:  23   REASON FOR REFERRAL: Assess oropharyngeal swallow function   EVALUATING THERAPIST: KARTHIK Lira      RESULTS:      DYSPHAGIA DIAGNOSIS:  moderate-severe oropharyngeal phase dysphagia     DIET RECOMMENDATIONS:  unable to recommend safe oral diet at this time, however Pt did require significant cues to remain alert, to actively swallow/ participate in exam which may have negatively impacted results. Defer to physician, care team, pt/family for goals of care and route of nutrition at this time     Recommend repeat in 3 to 5 days.      FEEDING RECOMMENDATIONS:    Assistance level:  Not applicable     Compensatory strategies recommended: Not applicable     Discussed recommendations with nursing and/or faxed report to referring provider: No secondary to no diet/liquid change recommended     Laryngeal Penetration and Aspiration:  Penetration WITHOUT aspiration was observed in today's study with  mildly thick liquid (nectar), moderately thick liquid (honey)    SPEECH THERAPY  PLAN OF CARE   The dysphagia POC is established based on physician order and dysphagia diagnosis    Skilled SLP intervention for dysphagia management on acute care up to 5 x per week until goals met, pt plateaus in function and/or discharged from hospital      Conditions Requiring Skilled Therapeutic Intervention for dysphagia:    delayed or slow lingual motion  repetitive/disorganized lingual motion   oral residue   swallow triggered once bolus head entered the valleculae  swallow triggered once bolus head in the pyriform sinus which increases risk of airway pharyngeal wall for all consistencies administered which partially cleared  with spontaneous multiple swallow  -- required MAX VERBAL CUES to complete multiple swallows       Pyriform Sinuses      Residuals in the pyriform sinuses were noted due to pharyngeal weakness for all consistencies administered  which  partially cleared  with spontaneous multiple swallow      LARYNGEAL PENETRATION   Laryngeal penetration occurred in the absence of aspiration AFTER the swallow for nectar consistency liquid and honey consistency liquid due to pharyngeal residuals which remained in the laryngeal vestibule. . Laryngeal penetration was mild and occurred consistently.   an absent cough/throat clear was noted    ASPIRATION  Aspiration  was not present during this evaluation however there is high risk for aspiration of all consistencies administered  due to laryngeal penetration and pharyngeal residuals    PENETRATION-ASPIRATION SCALE (PAS):  THIN item not administered  MILDLY THICK 3 = Material enters the airway, remains above the vocal folds, and is not ejected from the airway  MODERATELY THICK 3 = Material enters the airway, remains above the vocal folds, and is not ejected from the airway  PUREE 1 = Material does not enter the airway  HARD SOLID item not administered       COMPENSATORY STRATEGIES    Compensatory strategies that were not beneficial included Upright in bed/ chair as tolerated, Multiple swallow, and Small bites/sips      STRUCTURAL/FUNCTIONAL ANOMALIES   No structural/functional anomalies were noted    CERVICAL ESOPHAGEAL STAGE :     The cervical esophagus appeared adequate          ___________    Cognition:   Needs frequent verbal cues to maintain adequate alertness    Oral Peripheral Examination   Generalized oral weakness    Current Respiratory Status   room air     Parameters of Speech Production  Respiration:  Adequate for speech production  Quality:   Presbyphonic (Normal for age/premorbid

## 2023-12-08 ENCOUNTER — APPOINTMENT (OUTPATIENT)
Dept: GENERAL RADIOLOGY | Age: 88
End: 2023-12-08
Payer: MEDICARE

## 2023-12-08 LAB
ANION GAP SERPL CALCULATED.3IONS-SCNC: 15 MMOL/L (ref 7–16)
BACTERIA URNS QL MICRO: ABNORMAL
BASOPHILS # BLD: 0.02 K/UL (ref 0–0.2)
BASOPHILS NFR BLD: 0 % (ref 0–2)
BILIRUB UR QL STRIP: NEGATIVE
BUN SERPL-MCNC: 20 MG/DL (ref 6–23)
CALCIUM SERPL-MCNC: 8.1 MG/DL (ref 8.6–10.2)
CHLORIDE SERPL-SCNC: 104 MMOL/L (ref 98–107)
CLARITY UR: CLEAR
CO2 SERPL-SCNC: 22 MMOL/L (ref 22–29)
COLOR UR: YELLOW
CREAT SERPL-MCNC: 0.7 MG/DL (ref 0.7–1.2)
CRYSTALS URNS MICRO: ABNORMAL /HPF
EOSINOPHIL # BLD: 0.63 K/UL (ref 0.05–0.5)
EOSINOPHILS RELATIVE PERCENT: 10 % (ref 0–6)
ERYTHROCYTE [DISTWIDTH] IN BLOOD BY AUTOMATED COUNT: 13.9 % (ref 11.5–15)
GFR SERPL CREATININE-BSD FRML MDRD: >60 ML/MIN/1.73M2
GLUCOSE BLD-MCNC: 107 MG/DL (ref 74–99)
GLUCOSE BLD-MCNC: 75 MG/DL (ref 74–99)
GLUCOSE SERPL-MCNC: 75 MG/DL (ref 74–99)
GLUCOSE UR STRIP-MCNC: NEGATIVE MG/DL
HCT VFR BLD AUTO: 37.8 % (ref 37–54)
HGB BLD-MCNC: 12.3 G/DL (ref 12.5–16.5)
HGB UR QL STRIP.AUTO: NEGATIVE
IMM GRANULOCYTES # BLD AUTO: <0.03 K/UL (ref 0–0.58)
IMM GRANULOCYTES NFR BLD: 0 % (ref 0–5)
KETONES UR STRIP-MCNC: 15 MG/DL
LEUKOCYTE ESTERASE UR QL STRIP: NEGATIVE
LYMPHOCYTES NFR BLD: 1 K/UL (ref 1.5–4)
LYMPHOCYTES RELATIVE PERCENT: 16 % (ref 20–42)
MCH RBC QN AUTO: 31.9 PG (ref 26–35)
MCHC RBC AUTO-ENTMCNC: 32.5 G/DL (ref 32–34.5)
MCV RBC AUTO: 98.2 FL (ref 80–99.9)
MONOCYTES NFR BLD: 0.97 K/UL (ref 0.1–0.95)
MONOCYTES NFR BLD: 16 % (ref 2–12)
NEUTROPHILS NFR BLD: 58 % (ref 43–80)
NEUTS SEG NFR BLD: 3.59 K/UL (ref 1.8–7.3)
NITRITE UR QL STRIP: NEGATIVE
PH UR STRIP: 5.5 [PH] (ref 5–9)
PLATELET # BLD AUTO: 204 K/UL (ref 130–450)
PMV BLD AUTO: 10.4 FL (ref 7–12)
POTASSIUM SERPL-SCNC: 3.8 MMOL/L (ref 3.5–5)
PROT UR STRIP-MCNC: NEGATIVE MG/DL
RBC # BLD AUTO: 3.85 M/UL (ref 3.8–5.8)
RBC #/AREA URNS HPF: ABNORMAL /HPF
SODIUM SERPL-SCNC: 141 MMOL/L (ref 132–146)
SP GR UR STRIP: >1.03 (ref 1–1.03)
UROBILINOGEN UR STRIP-ACNC: 0.2 EU/DL (ref 0–1)
WBC #/AREA URNS HPF: ABNORMAL /HPF
WBC OTHER # BLD: 6.2 K/UL (ref 4.5–11.5)

## 2023-12-08 PROCEDURE — 74230 X-RAY XM SWLNG FUNCJ C+: CPT

## 2023-12-08 PROCEDURE — 6360000002 HC RX W HCPCS: Performed by: INTERNAL MEDICINE

## 2023-12-08 PROCEDURE — 6370000000 HC RX 637 (ALT 250 FOR IP)

## 2023-12-08 PROCEDURE — 97530 THERAPEUTIC ACTIVITIES: CPT

## 2023-12-08 PROCEDURE — 80048 BASIC METABOLIC PNL TOTAL CA: CPT

## 2023-12-08 PROCEDURE — 99231 SBSQ HOSP IP/OBS SF/LOW 25: CPT

## 2023-12-08 PROCEDURE — 85025 COMPLETE CBC W/AUTO DIFF WBC: CPT

## 2023-12-08 PROCEDURE — 1200000000 HC SEMI PRIVATE

## 2023-12-08 PROCEDURE — 2500000003 HC RX 250 WO HCPCS: Performed by: PHYSICIAN ASSISTANT

## 2023-12-08 PROCEDURE — 99233 SBSQ HOSP IP/OBS HIGH 50: CPT | Performed by: INTERNAL MEDICINE

## 2023-12-08 PROCEDURE — 92526 ORAL FUNCTION THERAPY: CPT | Performed by: SPEECH-LANGUAGE PATHOLOGIST

## 2023-12-08 PROCEDURE — 82962 GLUCOSE BLOOD TEST: CPT

## 2023-12-08 PROCEDURE — 92611 MOTION FLUOROSCOPY/SWALLOW: CPT | Performed by: SPEECH-LANGUAGE PATHOLOGIST

## 2023-12-08 PROCEDURE — 2580000003 HC RX 258: Performed by: INTERNAL MEDICINE

## 2023-12-08 PROCEDURE — 97535 SELF CARE MNGMENT TRAINING: CPT

## 2023-12-08 PROCEDURE — 36415 COLL VENOUS BLD VENIPUNCTURE: CPT

## 2023-12-08 RX ADMIN — BARIUM SULFATE 15 ML: 0.81 POWDER, FOR SUSPENSION ORAL at 14:03

## 2023-12-08 RX ADMIN — BARIUM SULFATE 15 ML: 400 SUSPENSION ORAL at 14:03

## 2023-12-08 RX ADMIN — BARIUM SULFATE 15 ML: 400 PASTE ORAL at 14:03

## 2023-12-08 RX ADMIN — APIXABAN 5 MG: 5 TABLET, FILM COATED ORAL at 21:24

## 2023-12-08 RX ADMIN — WATER 1000 MG: 1 INJECTION INTRAMUSCULAR; INTRAVENOUS; SUBCUTANEOUS at 19:57

## 2023-12-08 NOTE — CARE COORDINATION
Social Work/Case Management Transition of Care Planning (Clarke Tadeo [place] [phone]):  Per report and chart review,  IV Rocephin was resumed. Repeat UA was sent. Patient remains NPO. ST to re-eval patient when he is more alert as he was lethargic during MBS. Melatonin and seroquel were stopped. Multiple [facility] referrals have been made with denials. Most recent referral was to [facility]. Await decision. Met with patient's daughter, Yariel Mauricio, at bedside. Discussed discharge options. She is willing to try [facility] which is in network. She also requested a referral to [facility]. Explained patient does not have a diagnosis to support acute rehab and patient would have to be able to tolerate 3 hours of therapy/day. Daughter requested to at least try. Phone call to [facility]. She stated patient does not meet the medical criteria for acute rehab. Referral information given to [facility] for review for [facility]. CM/SW will follow. MICHEL Abbasi  12/8/2023     Update:  Patient was denied at [facility] due to no bed availability. Per [facility], patient has been accepted at [facility].  Updated PT/OT notes are needed to start pre-cert. Call to therapy department to request updates for pre-cert. JAMILA/destination completed. 7000 started and will need completed when discharge order is placed. Discharge envelope with ambulance form is in the soft chart and will need completed day of discharge. Phone call to daughter, Yariel Mauricio, to update her. CM/SW will follow.   MICHEL Abbasi  12/8/2023

## 2023-12-08 NOTE — PROGRESS NOTES
SPEECH/LANGUAGE PATHOLOGY  VIDEOFLUOROSCOPIC STUDY OF SWALLOWING (MBS)   and PLAN OF CARE    PATIENT NAME:  Eliud Garza  (male)     MRN:  [id number]    :  1930  (80 y.o.)  STATUS:  Inpatient: Room 8422/8422-B    TODAY'S DATE:  2023  REFERRING PROVIDER:   Dr. Walker Molina: [place] modified barium swallow with video  Date of order:  23   REASON FOR REFERRAL: Assess oropharyngeal swallow function   EVALUATING THERAPIST: KARTHIK Myles      RESULTS:      DYSPHAGIA DIAGNOSIS:  moderate-severe oropharyngeal phase dysphagia     DIET RECOMMENDATIONS:  NPO -- including medications to be given via alternate method      . FEEDING RECOMMENDATIONS:    Assistance level:  Not applicable     Compensatory strategies recommended: No strategies are recommended at this time     Discussed recommendations with nursing and/or faxed report to referring provider: No secondary to no diet/liquid change recommended     Laryngeal Penetration and Aspiration:  Penetration WITHOUT aspiration was observed in today's study with  pureed foods  Penetration WITH aspiration was observed in today's study with  moderately thick liquid (honey)    SPEECH THERAPY  PLAN OF CARE   The dysphagia POC is established based on physician order and dysphagia diagnosis    Skilled SLP intervention for dysphagia management on acute care up to 5 x per week until goals met, pt plateaus in function and/or discharged from hospital  Anticipate Patient will benefit from ongoing intensive speech therapy at discharge due to degree of deficits       Conditions Requiring Skilled Therapeutic Intervention for dysphagia:    Patient is performing below functional baseline d/t  current acute condition, Multiple diagnoses, multiple medications, and increased dependency upon caregivers. posterior escape of greater than half of the bolus.    delayed or slow lingual motion  repetitive/disorganized lingual motion   oral residue   swallow Study prior to exam and then type of swallowing impairment following completion of MBSS. Reviewed current solid/liquid consistency diet recommendations --   NPO (nothing by mouth including oral meds)  and discussed compensatory strategies to ensure safe PO intake. Images from MBSS reviewed with patient/ family and education provided. Reviewed aspiration precautions. Encouraged patient and/or family to engage SLP in unstructured Q&A session relative to identified deficit areas; indicated understanding of all information provided via satisfactory verbal response.

## 2023-12-08 NOTE — PROGRESS NOTES
Treatment Status  Date: 12/8/23 STGs=LTGs  Time Frame:trial OT    Feeding dependent DNT Min A   Grooming dependent Lashae  Pt washed face, applied deodorant seated upright in cahir Min A seated & mod vc's to engagte   UB Dressing dependent modA  Saji/doff hospital gown seated upright Min A   LB Dressing dependent Dependent  Saji/doff hospital socks  Mod A     Bathing dependent maxA  Simulated Task    Pt able to wash of UB, assistance to wash of LB, buttocks and tony area Mod A   Toileting dependent maxA/dependent  Pt completed toileting task on standard commode, pt requiring assistance to transfer with use of grab bar, manage of gown and complete of hygiene    Pt having of external male catheter due to incontinence of urine Mod A   Bed Mobility  Log rolling- SBA-CGA  Pt attempted to climb over side rail as I entered room & telesitter alarm was activated  Supine<>sit- Min A  Sit>supine- Min-Mod modA  Supine<>EOB  Log roll Sup  Supine <> sit: CGA      Functional Transfers Sit to stand: Mod A   Stand to sit:Mod A  Pt impulsive & did abruptly sit down x 3. Pt is unaware of his deficits min/modA  Sit to Stand  Stand to Sit    Stand Pivot Transfer EOB<>Chair with w.w.  Sit<>stand SBA-CGA  Stand pivot Mod A   Commode:  Mod A   Functional Mobility Max A to side step 2 steps to [address]  Pt ambulated short household distance with w.w, slow pace, poor safety awareness, assistance with walker management Mod A with recommend device by PT   Balance Sitting:     Static:  F    Dynamic:F-  Standing: static: F-  Dynamic P Sitting EOB:  SBA    Standing:  min/modA  Sitting:  Static- G-  Dynamic- F+  Standing Supported: static: G-  Dynamic F+   Activity Tolerance P Poor+  F for a 20 minute functional activity   Visual/  Perceptual U/a to formally assess- pt id track therapist movements  visual tracking appears WFL                               Hand dominance: unsure & pt u/a to provide info  UE ROM:        BUE: <3/4 in all planes Strength:        BUE:  5/5- grossly asssessed- pt had torn curtain off through the night   Strength: B WFL  Fine Motor Coordination:  u/a to assess     Hearing: [place]  Sensation:  u/a to assess due to cognitive deficits  Tone:  WFL  Edema: none noted        Comments/Treatment/Education: Upon arrival pt supine in bed, agreeable to therapy, speaking with nursing okaying pt to be seen this session, daughter present. Pt was educated on role of OT, goals to be reached, importance of OOB activity, safety and hand placement with transfers, safety/balance and walker management with functional mobility, balance/posture seated unsupported EOB and when standing upright, and compensatory strategies to assist with ADL tasks. At end of session, pt seated upright in chair, nursing aware, telesitter monitor in room, all lines and tubes intact, call light within reach. Pt has made slow progress towards set goals.    Continue with current plan of care      Treatment Time In: 11:10am       Treatment Time Out: 11:35am                 Treatment Charges: Mins Units   Ther Ex  41262     Manual Therapy 89526     Thera Activities 65394 8 1   ADL/Home Mgt 24823 17 1   Neuro Re-ed 12285     Group Therapy      Orthotic manage/training  47851     Non-Billable Time     Total Timed Treatment 25 2        Miguelina Brooks DUMONT/L [id number]

## 2023-12-08 NOTE — PLAN OF CARE
Problem: Safety - Adult  Goal: Free from fall injury  12/7/2023 2131 by José Aguero RN  Outcome: Progressing     Problem: Skin/Tissue Integrity  Goal: Absence of new skin breakdown  Description: 1. Monitor for areas of redness and/or skin breakdown  2. Assess vascular access sites hourly  3. Every 4-6 hours minimum:  Change oxygen saturation probe site  4. Every 4-6 hours:  If on nasal continuous positive airway pressure, respiratory therapy assess nares and determine need for appliance change or resting period.   Outcome: Progressing

## 2023-12-08 NOTE — PROGRESS NOTES
[facility] Progress Note     Synopsis:    Mr. Sarah Decker, a 80y.o. year old male  who  has a past medical history of Atrial fibrillation ([address]), Cancer ([address]), Hyperlipidemia, Hypertension, and Syncope and collapse. Patient presented to the hospital with chief complaint of confusion for 3 days. CT head negative for acute process. UA found to be pan sensitive Klebsiella. Neurology consulted. They feels this is secondary to UTI and acute delirium. No further work up needed from their standpoint. Patient failed swallow evaluation and MBSS-daughter would like melatonin and seroquel stopped to see if mental status improves. Repeat UA with some bacteria. Repeat MBSS still recommending NPO. As per daughter, she does not want to proceed with feeding tube, but she does want to proceed with pleasure feeding. However, not ready for hospice at this time. She would like to sign a waiver for feeding knowing that it is against what we are advising and knows the risks of potential aspiration, pneumonia, hypoxia, permanent disability, and death. Will place on pureed and pudding thick. Subjective:  More awake today. No complaints  Was able to later talk to his daughter and tell her that I saw him today.         cefTRIAXone (ROCEPHIN) IV  1,000 mg IntraVENous Q24H    sodium chloride flush  5-40 mL IntraVENous 2 times per day    amLODIPine  5 mg Oral Daily    apixaban  5 mg Oral BID    [Held by provider] furosemide  10 mg Oral Every Other Day    sertraline  25 mg Oral QAM     labetalol, 10 mg, Q4H PRN  sodium chloride flush, 5-40 mL, PRN  sodium chloride, , PRN  potassium chloride, 40 mEq, PRN   Or  potassium alternative oral replacement, 40 mEq, PRN   Or  potassium chloride, 10 mEq, PRN  magnesium sulfate, 2,000 mg, PRN  ondansetron, 4 mg, Q8H PRN   Or  ondansetron, 4 mg, Q6H

## 2023-12-08 NOTE — DISCHARGE INSTR - COC
Continuity of Care Form    Patient Name: Amna Gardner   :  1930  MRN:  [id number]    Admit date:  2023  Discharge date:  23    Code Status Order: Limited   Advance Directives:     Admitting Physician:  Ariella Mcfadden MD  PCP: Mattie Bassett MD    Discharging Nurse: Lesley Merchant, [address] Unit/Room#: 3834/8294-T  Discharging Unit Phone Number: [phone]    Emergency Contact:   Extended Emergency Contact Information  Primary Emergency Contact: Paramjit [facility] of [address] Phone: [phone]  Relation: Child    Past Surgical History:  Past Surgical History:   Procedure Laterality Date    CARDIOVERSION  2010    Successful to NSR at [facility]. CARDIOVERSION  2011    Successful to NSR at [facility]. CATARACT REMOVAL WITH IMPLANT      COLONOSCOPY  2007    [facility]UNDANT COLON- [facility]    DOPPLER ECHOCARDIOGRAPHY  2019    Dr Sarah Serrano abnormal left ventricular systolic function--mod severe tricuspid regurg w/mild pulmonary hyn--mild mitral, aortic, pulmonic regurg.     EYE SURGERY      CATARACT    HIP SURGERY Left 3/21/2021    LEFT HIP HEMIARTHROPLASTY [facility]) performed by Trini Tracy MD at [address] Left 2013    Dual chamber-Dr. Yee-[employer]    PROSTATECTOMY      20 years ago    PROSTATECTOMY  1988    RETROPUBIC RADICAL WITH/WO NERVE SPARING       Immunization History:   Immunization History   Administered Date(s) Administered    COVID-19, MODERNA BLUE border, Primary or Immunocompromised, (age 12y+), IM, 100 mcg/0.5mL 01/15/2021, 2021    COVID-19, MODERNA Booster BLUE border, (age 18y+), IM, 50mcg/0.25mL 2021    Influenza 2009, 10/17/2011    Influenza Vaccine, unspecified formulation 2009    Influenza, FLUZONE (age 72 y+), High Dose, 0.7mL 10/02/2020, 10/25/2021    Influenza, High Dose (Fluzone 65 yrs and older) 2014, 10/01/2015, 10/01/2015, 2016, Delivery   [facility] MED WSYUKDW}    Wound Care Documentation and Therapy:  Incision 13 Chest Left (Active)   Number of days: 0661       Incision 21 Hip Left (Active)   Number of days: 992        Elimination:  Continence: Bowel: {YES / MEDRANO:79247}  Bladder: {YES / BJ:85076}  Urinary Catheter: {Urinary Catheter:049533525}   Colostomy/Ileostomy/Ileal Conduit: {YES / XV:38150}       Date of Last BM: ***  No intake or output data in the 24 hours ending 23 1539  I/O last 3 completed shifts:   In: 120 [P.O.:120]  Out: -     Safety Concerns:     [address] Concerns:673013321}    Impairments/Disabilities:      [address] [place] [address] Impairments/Disabilities:721545644}    Nutrition Therapy:  Current Nutrition Therapy:   [address] [place] [address] Diet List:107248636}    Routes of Feeding: {P DME Other Feedings:552910711}  Liquids: {Slp liquid thickness:73195}  Daily Fluid Restriction: {CHP DME Yes amt example:875294369}  Last Modified Barium Swallow with Video (Video Swallowing Test): {Done Not Done OLHZ:667878086}    Treatments at the Time of Hospital Discharge:   Respiratory Treatments: ***  Oxygen Therapy:  {Therapy; copd oxygen:04900}  Ventilator:    { CC Vent XDGI:131812990}    Rehab Therapies: {THERAPEUTIC INTERVENTION:7828990020}  Weight Bearing Status/Restrictions: [address] [place] [address] Weight Bearin}  Other Medical Equipment (for information only, NOT a DME order):  {EQUIPMENT:426413594}  Other Treatments: ***    Patient's personal belongings (please select all that are sent with patient):  {[facility] DME Belongings:168191180}    RN SIGNATURE:  {Esignature:414852146}    CASE MANAGEMENT/SOCIAL WORK SECTION    Inpatient Status Date: 2023    Readmission Risk Assessment Score:  Readmission Risk              Risk of Unplanned Readmission:  15           Discharging to Facility/ Agency   Name: [facility]  Address:  [address] [place] [address]  Phone:  [phone]  Fax:    Dialysis Facility (if applicable)

## 2023-12-09 LAB
ANION GAP SERPL CALCULATED.3IONS-SCNC: 17 MMOL/L (ref 7–16)
BASOPHILS # BLD: 0.02 K/UL (ref 0–0.2)
BASOPHILS NFR BLD: 0 % (ref 0–2)
BUN SERPL-MCNC: 17 MG/DL (ref 6–23)
CALCIUM SERPL-MCNC: 8.6 MG/DL (ref 8.6–10.2)
CHLORIDE SERPL-SCNC: 102 MMOL/L (ref 98–107)
CO2 SERPL-SCNC: 21 MMOL/L (ref 22–29)
CREAT SERPL-MCNC: 0.6 MG/DL (ref 0.7–1.2)
EOSINOPHIL # BLD: 0.54 K/UL (ref 0.05–0.5)
EOSINOPHILS RELATIVE PERCENT: 9 % (ref 0–6)
ERYTHROCYTE [DISTWIDTH] IN BLOOD BY AUTOMATED COUNT: 13.7 % (ref 11.5–15)
GFR SERPL CREATININE-BSD FRML MDRD: >60 ML/MIN/1.73M2
GLUCOSE BLD-MCNC: 101 MG/DL (ref 74–99)
GLUCOSE SERPL-MCNC: 79 MG/DL (ref 74–99)
HCT VFR BLD AUTO: 39.7 % (ref 37–54)
HGB BLD-MCNC: 12.8 G/DL (ref 12.5–16.5)
IMM GRANULOCYTES # BLD AUTO: <0.03 K/UL (ref 0–0.58)
IMM GRANULOCYTES NFR BLD: 0 % (ref 0–5)
LYMPHOCYTES NFR BLD: 1.03 K/UL (ref 1.5–4)
LYMPHOCYTES RELATIVE PERCENT: 16 % (ref 20–42)
MCH RBC QN AUTO: 31.8 PG (ref 26–35)
MCHC RBC AUTO-ENTMCNC: 32.2 G/DL (ref 32–34.5)
MCV RBC AUTO: 98.5 FL (ref 80–99.9)
MONOCYTES NFR BLD: 1.03 K/UL (ref 0.1–0.95)
MONOCYTES NFR BLD: 16 % (ref 2–12)
NEUTROPHILS NFR BLD: 58 % (ref 43–80)
NEUTS SEG NFR BLD: 3.69 K/UL (ref 1.8–7.3)
PLATELET # BLD AUTO: 217 K/UL (ref 130–450)
PMV BLD AUTO: 10.4 FL (ref 7–12)
POTASSIUM SERPL-SCNC: 3.6 MMOL/L (ref 3.5–5)
RBC # BLD AUTO: 4.03 M/UL (ref 3.8–5.8)
SODIUM SERPL-SCNC: 140 MMOL/L (ref 132–146)
WBC OTHER # BLD: 6.3 K/UL (ref 4.5–11.5)

## 2023-12-09 PROCEDURE — 6370000000 HC RX 637 (ALT 250 FOR IP): Performed by: INTERNAL MEDICINE

## 2023-12-09 PROCEDURE — 82962 GLUCOSE BLOOD TEST: CPT

## 2023-12-09 PROCEDURE — 99233 SBSQ HOSP IP/OBS HIGH 50: CPT | Performed by: INTERNAL MEDICINE

## 2023-12-09 PROCEDURE — 85025 COMPLETE CBC W/AUTO DIFF WBC: CPT

## 2023-12-09 PROCEDURE — 2580000003 HC RX 258: Performed by: INTERNAL MEDICINE

## 2023-12-09 PROCEDURE — 6370000000 HC RX 637 (ALT 250 FOR IP)

## 2023-12-09 PROCEDURE — 6360000002 HC RX W HCPCS: Performed by: INTERNAL MEDICINE

## 2023-12-09 PROCEDURE — 36415 COLL VENOUS BLD VENIPUNCTURE: CPT

## 2023-12-09 PROCEDURE — 80048 BASIC METABOLIC PNL TOTAL CA: CPT

## 2023-12-09 PROCEDURE — 6370000000 HC RX 637 (ALT 250 FOR IP): Performed by: STUDENT IN AN ORGANIZED HEALTH CARE EDUCATION/TRAINING PROGRAM

## 2023-12-09 PROCEDURE — 6370000000 HC RX 637 (ALT 250 FOR IP): Performed by: NURSE PRACTITIONER

## 2023-12-09 PROCEDURE — 1200000000 HC SEMI PRIVATE

## 2023-12-09 RX ORDER — DIAPER,BRIEF,INFANT-TODD,DISP
EACH MISCELLANEOUS 2 TIMES DAILY
Status: DISPENSED | OUTPATIENT
Start: 2023-12-09

## 2023-12-09 RX ADMIN — SERTRALINE HYDROCHLORIDE 25 MG: 50 TABLET ORAL at 09:56

## 2023-12-09 RX ADMIN — APIXABAN 5 MG: 5 TABLET, FILM COATED ORAL at 21:46

## 2023-12-09 RX ADMIN — HYDROCORTISONE: 1 CREAM TOPICAL at 21:46

## 2023-12-09 RX ADMIN — HYDROCORTISONE: 1 CREAM TOPICAL at 15:12

## 2023-12-09 RX ADMIN — PETROLATUM: 420 OINTMENT TOPICAL at 21:46

## 2023-12-09 RX ADMIN — APIXABAN 5 MG: 5 TABLET, FILM COATED ORAL at 09:56

## 2023-12-09 RX ADMIN — WATER 1000 MG: 1 INJECTION INTRAMUSCULAR; INTRAVENOUS; SUBCUTANEOUS at 17:23

## 2023-12-09 RX ADMIN — AMLODIPINE BESYLATE 5 MG: 5 TABLET ORAL at 09:56

## 2023-12-09 NOTE — PROGRESS NOTES
Patient daughter states \"I signed a form for him to refuse NPO recommendation, I'd like for him to be able to drink thin liquids. \" I explained the risks including aspiration pneumonia and death. Refusal filed in patient chart. Water given.

## 2023-12-09 NOTE — PLAN OF CARE
Problem: Safety - Adult  Goal: Free from fall injury  12/9/2023 1507 by Zee Decker RN  Outcome: Progressing  12/9/2023 0150 by Charla Srinivasan RN  Outcome: Progressing     Problem: ABCDS Injury Assessment  Goal: Absence of physical injury  12/9/2023 1507 by Zee Decker RN  Outcome: Progressing  12/9/2023 0150 by Charla Srinivasan RN  Outcome: Progressing     Problem: Pain  Goal: Verbalizes/displays adequate comfort level or baseline comfort level  12/9/2023 1507 by Zee Decker RN  Outcome: Progressing  12/9/2023 0150 by Charla Srinivasan RN  Outcome: Progressing     Problem: Skin/Tissue Integrity  Goal: Absence of new skin breakdown  Description: 1. Monitor for areas of redness and/or skin breakdown  2. Assess vascular access sites hourly  3. Every 4-6 hours minimum:  Change oxygen saturation probe site  4. Every 4-6 hours:  If on nasal continuous positive airway pressure, respiratory therapy assess nares and determine need for appliance change or resting period.   12/9/2023 1507 by Zee Decker RN  Outcome: Progressing  12/9/2023 0150 by Charla Srinivasan RN  Outcome: Progressing

## 2023-12-10 VITALS
DIASTOLIC BLOOD PRESSURE: 75 MMHG | OXYGEN SATURATION: 94 % | BODY MASS INDEX: 22.94 KG/M2 | HEART RATE: 68 BPM | TEMPERATURE: 97.3 F | RESPIRATION RATE: 16 BRPM | SYSTOLIC BLOOD PRESSURE: 184 MMHG | WEIGHT: 154.9 LBS | HEIGHT: 69 IN

## 2023-12-10 LAB
ANION GAP SERPL CALCULATED.3IONS-SCNC: 16 MMOL/L (ref 7–16)
BASOPHILS # BLD: 0.02 K/UL (ref 0–0.2)
BASOPHILS NFR BLD: 0 % (ref 0–2)
BUN SERPL-MCNC: 9 MG/DL (ref 6–23)
CALCIUM SERPL-MCNC: 8.9 MG/DL (ref 8.6–10.2)
CHLORIDE SERPL-SCNC: 101 MMOL/L (ref 98–107)
CO2 SERPL-SCNC: 21 MMOL/L (ref 22–29)
CREAT SERPL-MCNC: 0.6 MG/DL (ref 0.7–1.2)
EOSINOPHIL # BLD: 0.58 K/UL (ref 0.05–0.5)
EOSINOPHILS RELATIVE PERCENT: 9 % (ref 0–6)
ERYTHROCYTE [DISTWIDTH] IN BLOOD BY AUTOMATED COUNT: 13.4 % (ref 11.5–15)
GFR SERPL CREATININE-BSD FRML MDRD: >60 ML/MIN/1.73M2
GLUCOSE BLD-MCNC: 112 MG/DL (ref 74–99)
GLUCOSE BLD-MCNC: 88 MG/DL (ref 74–99)
GLUCOSE SERPL-MCNC: 87 MG/DL (ref 74–99)
HCT VFR BLD AUTO: 39.5 % (ref 37–54)
HGB BLD-MCNC: 13.1 G/DL (ref 12.5–16.5)
IMM GRANULOCYTES # BLD AUTO: 0.03 K/UL (ref 0–0.58)
IMM GRANULOCYTES NFR BLD: 1 % (ref 0–5)
LYMPHOCYTES NFR BLD: 1.1 K/UL (ref 1.5–4)
LYMPHOCYTES RELATIVE PERCENT: 17 % (ref 20–42)
MCH RBC QN AUTO: 32.3 PG (ref 26–35)
MCHC RBC AUTO-ENTMCNC: 33.2 G/DL (ref 32–34.5)
MCV RBC AUTO: 97.3 FL (ref 80–99.9)
MONOCYTES NFR BLD: 1.03 K/UL (ref 0.1–0.95)
MONOCYTES NFR BLD: 16 % (ref 2–12)
NEUTROPHILS NFR BLD: 57 % (ref 43–80)
NEUTS SEG NFR BLD: 3.71 K/UL (ref 1.8–7.3)
PLATELET # BLD AUTO: 209 K/UL (ref 130–450)
PMV BLD AUTO: 10.4 FL (ref 7–12)
POTASSIUM SERPL-SCNC: 3.9 MMOL/L (ref 3.5–5)
RBC # BLD AUTO: 4.06 M/UL (ref 3.8–5.8)
SODIUM SERPL-SCNC: 138 MMOL/L (ref 132–146)
WBC OTHER # BLD: 6.5 K/UL (ref 4.5–11.5)

## 2023-12-10 PROCEDURE — 99232 SBSQ HOSP IP/OBS MODERATE 35: CPT | Performed by: INTERNAL MEDICINE

## 2023-12-10 PROCEDURE — 6370000000 HC RX 637 (ALT 250 FOR IP): Performed by: STUDENT IN AN ORGANIZED HEALTH CARE EDUCATION/TRAINING PROGRAM

## 2023-12-10 PROCEDURE — 80048 BASIC METABOLIC PNL TOTAL CA: CPT

## 2023-12-10 PROCEDURE — 85025 COMPLETE CBC W/AUTO DIFF WBC: CPT

## 2023-12-10 PROCEDURE — 6370000000 HC RX 637 (ALT 250 FOR IP)

## 2023-12-10 PROCEDURE — 36415 COLL VENOUS BLD VENIPUNCTURE: CPT

## 2023-12-10 PROCEDURE — 1200000000 HC SEMI PRIVATE

## 2023-12-10 PROCEDURE — 82962 GLUCOSE BLOOD TEST: CPT

## 2023-12-10 RX ADMIN — APIXABAN 5 MG: 5 TABLET, FILM COATED ORAL at 20:36

## 2023-12-10 RX ADMIN — APIXABAN 5 MG: 5 TABLET, FILM COATED ORAL at 09:46

## 2023-12-10 RX ADMIN — SERTRALINE HYDROCHLORIDE 25 MG: 50 TABLET ORAL at 09:46

## 2023-12-10 RX ADMIN — ACETAMINOPHEN 650 MG: 325 TABLET ORAL at 09:46

## 2023-12-10 RX ADMIN — PETROLATUM: 420 OINTMENT TOPICAL at 20:36

## 2023-12-10 RX ADMIN — HYDROCORTISONE: 1 CREAM TOPICAL at 09:47

## 2023-12-10 RX ADMIN — HYDROCORTISONE: 1 CREAM TOPICAL at 20:36

## 2023-12-10 NOTE — PLAN OF CARE
Problem: Safety - Adult  Goal: Free from fall injury  12/10/2023 0155 by Susan Guzman RN  Outcome: Progressing  12/9/2023 1507 by Michael Abrams RN  Outcome: Progressing     Problem: ABCDS Injury Assessment  Goal: Absence of physical injury  12/10/2023 0155 by Susan Guzman RN  Outcome: Progressing  12/9/2023 1507 by Michael Abrams RN  Outcome: Progressing     Problem: Pain  Goal: Verbalizes/displays adequate comfort level or baseline comfort level  12/10/2023 0155 by Susan Guzman RN  Outcome: Progressing  12/9/2023 1507 by Michael Abrams RN  Outcome: Progressing     Problem: Skin/Tissue Integrity  Goal: Absence of new skin breakdown  Description: 1. Monitor for areas of redness and/or skin breakdown  2. Assess vascular access sites hourly  3. Every 4-6 hours minimum:  Change oxygen saturation probe site  4. Every 4-6 hours:  If on nasal continuous positive airway pressure, respiratory therapy assess nares and determine need for appliance change or resting period.   12/10/2023 0155 by Susan Guzman RN  Outcome: Progressing  12/9/2023 1507 by Michael Abrams RN  Outcome: Progressing

## 2023-12-10 NOTE — PROGRESS NOTES
Occupational Therapy     Date:12/10/2023  Patient Name: Teressa Wright  MRN: [id number]  : 1930  Room: [address] [place] [address]     Completed chart review & attempted to see pt with pt unable to be fully aroused from a deep sleep. Daughter present reporting pt \"had a bad night & didn't sleep\". Encouraged daughter to turns on lights & attempt to arouse him this afternoon to prevent his days and nights getting mixed up. Will attempt to see pt at another time. Danny Mroales.  [address] [facility]

## 2023-12-10 NOTE — PROGRESS NOTES
[facility] Progress Note     Synopsis:    Mr. Roxy Dougherty, a 80y.o. year old male  who  has a past medical history of Atrial fibrillation ([address]), Cancer ([address]), Hyperlipidemia, Hypertension, and Syncope and collapse. Patient presented to the hospital with chief complaint of confusion for 3 days. CT head negative for acute process. UA found to be pan sensitive Klebsiella. Neurology consulted. They feels this is secondary to UTI and acute delirium. No further work up needed from their standpoint. Patient failed swallow evaluation and MBSS-daughter would like melatonin and seroquel stopped to see if mental status improves. Repeat UA with some bacteria. Repeat MBSS still recommending NPO. As per daughter, she does not want to proceed with feeding tube, but she does want to proceed with pleasure feeding. However, not ready for hospice at this time. She would like to sign a waiver for feeding knowing that it is against what we are advising and knows the risks of potential aspiration, pneumonia, hypoxia, permanent disability, and death. Will place on pureed and pudding thick. Daughter asking for thin liquids-understands risks. Placed on thin liquids. Subjective:      More sleepy today   However, did not sleep at night. Daughter would like to avoid any medications that may be sedating at night because he is still sleepy during the day.    He does open his eyes and responds to me, but falls back asleep.      hydrocortisone   Topical BID    sodium chloride flush  5-40 mL IntraVENous 2 times per day    apixaban  5 mg Oral BID    [Held by provider] furosemide  10 mg Oral Every Other Day    sertraline  25 mg Oral QAM     white petrolatum, , BID PRN  labetalol, 10 mg, Q4H PRN  sodium chloride flush, 5-40 mL, PRN  sodium chloride, , PRN  potassium chloride, 40 mEq, PRN

## 2023-12-10 NOTE — PLAN OF CARE
Problem: Safety - Adult  Goal: Free from fall injury  12/10/2023 1032 by Jh Parmar RN  Outcome: Progressing  12/10/2023 0155 by Yara Glynn RN  Outcome: Progressing     Problem: ABCDS Injury Assessment  Goal: Absence of physical injury  12/10/2023 1032 by Jh Parmar RN  Outcome: Progressing  12/10/2023 0155 by Yara Glynn RN  Outcome: Progressing     Problem: Pain  Goal: Verbalizes/displays adequate comfort level or baseline comfort level  12/10/2023 0155 by Yara Glynn RN  Outcome: Progressing     Problem: Skin/Tissue Integrity  Goal: Absence of new skin breakdown  Description: 1. Monitor for areas of redness and/or skin breakdown  2. Assess vascular access sites hourly  3. Every 4-6 hours minimum:  Change oxygen saturation probe site  4. Every 4-6 hours:  If on nasal continuous positive airway pressure, respiratory therapy assess nares and determine need for appliance change or resting period.   12/10/2023 0155 by Yara Glynn RN  Outcome: Progressing

## 2023-12-10 NOTE — PROGRESS NOTES
Physical Therapy    Patient is currently unavailable for PT services, as patient is unable to be fully aroused. Per daughter, pt did not sleep well last night. Will follow up as appropriate and update therapy notes.      Oval Hum, PT, DPT  [id number]

## 2023-12-11 LAB
ANION GAP SERPL CALCULATED.3IONS-SCNC: 20 MMOL/L (ref 7–16)
BASOPHILS # BLD: 0.02 K/UL (ref 0–0.2)
BASOPHILS NFR BLD: 0 % (ref 0–2)
BUN SERPL-MCNC: 9 MG/DL (ref 6–23)
CALCIUM SERPL-MCNC: 8.4 MG/DL (ref 8.6–10.2)
CHLORIDE SERPL-SCNC: 99 MMOL/L (ref 98–107)
CO2 SERPL-SCNC: 19 MMOL/L (ref 22–29)
CREAT SERPL-MCNC: 0.7 MG/DL (ref 0.7–1.2)
EOSINOPHIL # BLD: 0.09 K/UL (ref 0.05–0.5)
EOSINOPHILS RELATIVE PERCENT: 1 % (ref 0–6)
ERYTHROCYTE [DISTWIDTH] IN BLOOD BY AUTOMATED COUNT: 13.4 % (ref 11.5–15)
GFR SERPL CREATININE-BSD FRML MDRD: >60 ML/MIN/1.73M2
GLUCOSE BLD-MCNC: 108 MG/DL (ref 74–99)
GLUCOSE BLD-MCNC: 111 MG/DL (ref 74–99)
GLUCOSE SERPL-MCNC: 81 MG/DL (ref 74–99)
HCT VFR BLD AUTO: 39.3 % (ref 37–54)
HGB BLD-MCNC: 13.1 G/DL (ref 12.5–16.5)
IMM GRANULOCYTES # BLD AUTO: 0.04 K/UL (ref 0–0.58)
IMM GRANULOCYTES NFR BLD: 0 % (ref 0–5)
LYMPHOCYTES NFR BLD: 0.82 K/UL (ref 1.5–4)
LYMPHOCYTES RELATIVE PERCENT: 7 % (ref 20–42)
MCH RBC QN AUTO: 32.3 PG (ref 26–35)
MCHC RBC AUTO-ENTMCNC: 33.3 G/DL (ref 32–34.5)
MCV RBC AUTO: 96.8 FL (ref 80–99.9)
MONOCYTES NFR BLD: 1.06 K/UL (ref 0.1–0.95)
MONOCYTES NFR BLD: 9 % (ref 2–12)
NEUTROPHILS NFR BLD: 83 % (ref 43–80)
NEUTS SEG NFR BLD: 9.71 K/UL (ref 1.8–7.3)
PLATELET # BLD AUTO: 221 K/UL (ref 130–450)
PMV BLD AUTO: 10.7 FL (ref 7–12)
POTASSIUM SERPL-SCNC: 3.6 MMOL/L (ref 3.5–5)
RBC # BLD AUTO: 4.06 M/UL (ref 3.8–5.8)
SODIUM SERPL-SCNC: 138 MMOL/L (ref 132–146)
WBC OTHER # BLD: 11.7 K/UL (ref 4.5–11.5)

## 2023-12-11 ASSESSMENT — PAIN DESCRIPTION - LOCATION: LOCATION: NECK

## 2023-12-11 ASSESSMENT — PAIN DESCRIPTION - ORIENTATION: ORIENTATION: RIGHT

## 2023-12-11 NOTE — CARE COORDINATION
Social Work/Case Management Transition of [facility] Planning [phone][facility] Per report and chart review, patient failed his repeat MBS. ST recommended patient remain NPO. However, his daughter signed a waiver for pleasure feeds. Patient is now on a pureed diet with thin liquids. Staff is to assist with feeding. Family does not want a feeding tube but are not ready for hospice. Patient remains somnolent during the day despite Melatonin and Seroquel being discontinued. Updated PT/OT scores noted to be 11/9. Met with patient and daughter at bedside. He had been accepted at [facility]. However, [facility] does now have a bed available. Spoke with Rocio Kinney of [facility]. She will start pre-cert today but patient must be 48 hours telesitter free per their admission guidelines. Notified Silke Payne of [facility]. JAMILA/destination updated. 7000 started and will need completed when discharge order is placed. Discharge envelope with ambulance form is in the soft chart and will need completed day of discharge. CM/SW will follow.    MICHEL Valdez  12/11/2023

## 2023-12-11 NOTE — PLAN OF CARE
Problem: Safety - Adult  Goal: Free from fall injury  12/10/2023 2124 by Osmar Lee RN  Outcome: Progressing  12/10/2023 1032 by Leona Osorio RN  Outcome: Progressing     Problem: ABCDS Injury Assessment  Goal: Absence of physical injury  12/10/2023 2124 by Osmar Lee RN  Outcome: Progressing  12/10/2023 1032 by Leona Osorio RN  Outcome: Progressing     Problem: Pain  Goal: Verbalizes/displays adequate comfort level or baseline comfort level  Outcome: Progressing     Problem: Skin/Tissue Integrity  Goal: Absence of new skin breakdown  Description: 1. Monitor for areas of redness and/or skin breakdown  2. Assess vascular access sites hourly  3. Every 4-6 hours minimum:  Change oxygen saturation probe site  4. Every 4-6 hours:  If on nasal continuous positive airway pressure, respiratory therapy assess nares and determine need for appliance change or resting period.   Outcome: Progressing

## 2023-12-11 NOTE — PROGRESS NOTES
Treatment Status  Date: 12/11/23 STGs=LTGs  Time Frame:trial OT    Feeding dependent Dependent  To grasp cup, bring to mouth, placing of straw in mouth Min A   Grooming dependent maxA  Pt requiring assistance to wash face, мария deodorant Min A seated & mod vc's to engagte   UB Dressing dependent maxA  [facility] gown seated upright Min A   LB Dressing dependent Dependent  [facility] socks  Mod A     Bathing dependent maxA  Simulated Task    Pt able to wash of UB, assistance to wash of LB, buttocks and tony area Mod A   Toileting dependent DNT  maxA/dependent per previous session Mod A   Bed Mobility  Log rolling- SBA-CGA  Pt attempted to climb over side rail as I entered room & telesitter alarm was activated  Supine<>sit- Min A  Sit>supine- Min-Mod modA  Supine<>EOB  Log roll Sup  Supine <> sit: CGA      Functional Transfers Sit to stand: Mod A   Stand to sit:Mod A  Pt impulsive & did abruptly sit down x 3. Pt is unaware of his deficits modA  Sit to Stand  Stand to Sit    Stand Pivot Transfer Chair<>EOB with w.w.  Sit<>stand SBA-CGA  Stand pivot Mod A   Commode:  Mod A   Functional Mobility Max A to side step 2 steps to [address]  Pt ambulated short household distance with w.w, slow pace, poor safety awareness, assistance with walker management Mod A with recommend device by PT   Balance Sitting:     Static:  F    Dynamic:F-  Standing: static: F-  Dynamic P Sitting EOB:  SBA    Standing:  modA Sitting:  Static- G-  Dynamic- F+  Standing Supported: static: G-  Dynamic F+   Activity Tolerance P Poor+  F for a 20 minute functional activity   Visual/  Perceptual U/a to formally assess- pt id track therapist movements  visual tracking appears WFL                               Hand dominance: unsure & pt u/a to provide info  UE ROM:        BUE: <3/4 in all planes                         Strength:        BUE:  5/5- grossly asssessed- pt had torn curtain off through the night   Strength: B WFL  Fine Motor

## 2023-12-11 NOTE — CARE COORDINATION
Social Work/Case Management Transition of Care Planning (Alvin [place] [phone]): Message sent to attending via [employer] to request she review for need for telesitter. She indicated the telesitter can be discontinued. Order has been discontined.   MICHEL Tenorio  12/11/2023

## 2023-12-11 NOTE — PROGRESS NOTES
to facilitate upright posture, joint and skin integrity, and interaction with environment. Pt's/ family goals   1. Home     Prognosis is fair for reaching above PT goals. Patient and or family understand(s) diagnosis, prognosis, and plan of care. Pt  - ? Family - yes     PHYSICAL THERAPY PLAN OF CARE:    PT POC is established based on physician order and patient diagnosis     Referring provider/PT Order:  Mirian Bardales MD / PT eval and treat   Diagnosis:  Delirium [R41.0]  Acute electrocardiogram changes [R94.31]  Altered mental status, unspecified altered mental status type [R41.82]  Specific instructions for next treatment:  transfer training, gait training     Current Treatment Recommendations:     [x] Strengthening to improve independence with functional mobility   [] ROM to improve independence with functional mobility   [x] Balance Training to improve static/dynamic balance and to reduce fall risk  [x] Endurance Training to improve activity tolerance during functional mobility   [x] Transfer Training to improve safety and independence with all functional transfers   [x] Gait Training to improve gait mechanics, endurance and asses need for appropriate assistive device  [x] Stair Training in preparation for safe discharge home and/or into the community   [x] Positioning to prevent skin breakdown and contractures  [x] Safety and Education Training   [x] Patient/Caregiver Education   [x] HEP  [] Other     PT long term treatment goals are located in above grid    Frequency of treatments: 2-5x/week x 1-2 weeks. Time in  0900  Time out  0940    Total Treatment Time  40 minutes     Evaluation Time includes thorough review of current medical information, gathering information on past medical history/social history and prior level of function, completion of standardized testing/informal observation of tasks, assessment of data and education on plan of care and goals.     CPT codes:  [] Low Complexity PT

## 2023-12-11 NOTE — PLAN OF CARE
Problem: Safety - Adult  Goal: Free from fall injury  12/11/2023 1047 by Sisto Schirmer, RN  Outcome: Progressing  12/10/2023 2124 by Jason Atkinson RN  Outcome: Progressing     Problem: ABCDS Injury Assessment  Goal: Absence of physical injury  12/11/2023 1047 by Sisto Schirmer, RN  Outcome: Progressing  12/10/2023 2124 by Jason Atkinson RN  Outcome: Progressing     Problem: Pain  Goal: Verbalizes/displays adequate comfort level or baseline comfort level  12/11/2023 1047 by Sisto Schirmer, RN  Outcome: Progressing  12/10/2023 2124 by Jason Atkinson RN  Outcome: Progressing     Problem: Skin/Tissue Integrity  Goal: Absence of new skin breakdown  Description: 1. Monitor for areas of redness and/or skin breakdown  2. Assess vascular access sites hourly  3. Every 4-6 hours minimum:  Change oxygen saturation probe site  4. Every 4-6 hours:  If on nasal continuous positive airway pressure, respiratory therapy assess nares and determine need for appliance change or resting period.   12/10/2023 2124 by Jason Atkinson RN  Outcome: Progressing

## 2023-12-12 LAB
ANION GAP SERPL CALCULATED.3IONS-SCNC: 15 MMOL/L (ref 7–16)
BASOPHILS # BLD: 0.02 K/UL (ref 0–0.2)
BASOPHILS NFR BLD: 0 % (ref 0–2)
BUN SERPL-MCNC: 12 MG/DL (ref 6–23)
CALCIUM SERPL-MCNC: 8.9 MG/DL (ref 8.6–10.2)
CHLORIDE SERPL-SCNC: 102 MMOL/L (ref 98–107)
CO2 SERPL-SCNC: 23 MMOL/L (ref 22–29)
CREAT SERPL-MCNC: 0.7 MG/DL (ref 0.7–1.2)
EOSINOPHIL # BLD: 0.46 K/UL (ref 0.05–0.5)
EOSINOPHILS RELATIVE PERCENT: 6 % (ref 0–6)
ERYTHROCYTE [DISTWIDTH] IN BLOOD BY AUTOMATED COUNT: 13.5 % (ref 11.5–15)
GFR SERPL CREATININE-BSD FRML MDRD: >60 ML/MIN/1.73M2
GLUCOSE BLD-MCNC: 117 MG/DL (ref 74–99)
GLUCOSE BLD-MCNC: 119 MG/DL (ref 74–99)
GLUCOSE BLD-MCNC: 90 MG/DL (ref 74–99)
GLUCOSE BLD-MCNC: 92 MG/DL (ref 74–99)
GLUCOSE SERPL-MCNC: 86 MG/DL (ref 74–99)
HCT VFR BLD AUTO: 40.7 % (ref 37–54)
HGB BLD-MCNC: 13.3 G/DL (ref 12.5–16.5)
IMM GRANULOCYTES # BLD AUTO: 0.03 K/UL (ref 0–0.58)
IMM GRANULOCYTES NFR BLD: 0 % (ref 0–5)
LYMPHOCYTES NFR BLD: 1.19 K/UL (ref 1.5–4)
LYMPHOCYTES RELATIVE PERCENT: 15 % (ref 20–42)
MCH RBC QN AUTO: 31.7 PG (ref 26–35)
MCHC RBC AUTO-ENTMCNC: 32.7 G/DL (ref 32–34.5)
MCV RBC AUTO: 96.9 FL (ref 80–99.9)
MONOCYTES NFR BLD: 1.05 K/UL (ref 0.1–0.95)
MONOCYTES NFR BLD: 13 % (ref 2–12)
NEUTROPHILS NFR BLD: 67 % (ref 43–80)
NEUTS SEG NFR BLD: 5.46 K/UL (ref 1.8–7.3)
PLATELET # BLD AUTO: 239 K/UL (ref 130–450)
PMV BLD AUTO: 10.4 FL (ref 7–12)
POTASSIUM SERPL-SCNC: 3.6 MMOL/L (ref 3.5–5)
RBC # BLD AUTO: 4.2 M/UL (ref 3.8–5.8)
SODIUM SERPL-SCNC: 140 MMOL/L (ref 132–146)
WBC OTHER # BLD: 8.2 K/UL (ref 4.5–11.5)

## 2023-12-12 ASSESSMENT — PAIN DESCRIPTION - ORIENTATION: ORIENTATION: LEFT

## 2023-12-12 ASSESSMENT — PAIN SCALES - WONG BAKER: WONGBAKER_NUMERICALRESPONSE: 0

## 2023-12-12 ASSESSMENT — PAIN SCALES - GENERAL
PAINLEVEL_OUTOF10: 2
PAINLEVEL_OUTOF10: 3
PAINLEVEL_OUTOF10: 3

## 2023-12-12 ASSESSMENT — PAIN DESCRIPTION - DESCRIPTORS: DESCRIPTORS: ACHING

## 2023-12-12 ASSESSMENT — PAIN DESCRIPTION - LOCATION: LOCATION: SHOULDER

## 2023-12-12 NOTE — PROGRESS NOTES
no acute distress  HEENT:  Conjunctivae/corneas clear. Neck: Supple. No jugular venous distention. Respiratory: symmetrical; clear to auscultation bilaterally; no wheezes; no rhonchi; no rales  Cardiovascular: rhythm regular; rate controlled; no murmurs  Abdomen: Soft, nontender, nondistended  Extremities:  peripheral pulses present; no peripheral edema; no ulcers  Musculoskeletal: No clubbing, cyanosis, no bilateral lower extremity edema. Brisk capillary refill. Skin:  No rashes  on visible skin  Neurologic: awake, alert and following commands     ASSESSMENT and PLAN:    Acute metabolic encephalopathy  Likely delirium secondary to hospital delirium and urinary tract infection  Suspect underlying dementia  Electrolytes stable  Neurology was consulted, no further workup needed as encephalopathy due to infection  Reorient the patient   Poor oral intake. Will continue IVF. Ammonia and ABG. Both unrevealing. Unfortunately, poor prognosis. Tele sitter discontinued on 12/10 as patient needs to be sitter free for 48 hours prior to dc to SNF     Dysphagia- severe  Speech consulted-MBSS failed X 2. However, daughter would like to proceed with pleasure feeding knowing the risks as explained above and on the waiver. Chest x-ray ordered. No acute process      Urinary tract infection  Pan sensitive Klebsiella   Rocephin. Completed 7 days. Uncontrolled hypertension  Stable  Norvasc      Atrial fibrillation  Pacemaker in situ  Continue Eliquis and telemetry monitoring     Depression/anxiety  Continue home Zoloft     Rash  Seems about the same  Hydrocortisone cream.     DVT Prophylaxis Eliquis   GI Prophylaxis [] PPI,  [] H2 Blocker,  [] Carafate,  [x] Diet/Tube Feeds   Disposition Patient requires continued admission due to awaiting placement. Needs to be sitter free for 48 hours (currently at 24 hours). Plan of care discussed with CM.    MDM [] Low, [x] Moderate,[]  High       Medications:  REVIEWED

## 2023-12-12 NOTE — PLAN OF CARE
Problem: Safety - Adult  Goal: Free from fall injury  12/12/2023 1141 by Sandy Heller RN  Outcome: Progressing     Problem: ABCDS Injury Assessment  Goal: Absence of physical injury  12/12/2023 1141 by Sandy Heller RN  Outcome: Progressing     Problem: Pain  Goal: Verbalizes/displays adequate comfort level or baseline comfort level  12/12/2023 1141 by Sandy Heller RN  Outcome: Progressing     Problem: Skin/Tissue Integrity  Goal: Absence of new skin breakdown  Description: 1. Monitor for areas of redness and/or skin breakdown  2. Assess vascular access sites hourly  3. Every 4-6 hours minimum:  Change oxygen saturation probe site  4. Every 4-6 hours:  If on nasal continuous positive airway pressure, respiratory therapy assess nares and determine need for appliance change or resting period.   12/12/2023 1141 by Sandy Heller RN  Outcome: Progressing

## 2023-12-12 NOTE — CARE COORDINATION
Social Work/Case Management Transition of Care Planning Candelario Manual Dangelo [phone]):  Per report and chart review, patient remains on a pureed diet with thin liquids. Daughter signed a waiver for patient to eat. Telesitter was discontinued on 12/11. Discharge plan is to [facility]. Pre-cert was started on 59/01. Patient must be telesitter free x48 hours per [facility]. JAMILA/destination updated. 7000 completed. Discharge envelope with ambulance form is in the soft chart and will need completed day of discharge. CM/SW will follow.   MICHEL Potter  12/12/2023

## 2023-12-13 PROBLEM — R13.10 DYSPHAGIA: Status: ACTIVE | Noted: 2023-12-13

## 2023-12-13 LAB
ANION GAP SERPL CALCULATED.3IONS-SCNC: 12 MMOL/L (ref 7–16)
BASOPHILS # BLD: 0.03 K/UL (ref 0–0.2)
BASOPHILS NFR BLD: 1 % (ref 0–2)
BUN SERPL-MCNC: 12 MG/DL (ref 6–23)
CALCIUM SERPL-MCNC: 8.7 MG/DL (ref 8.6–10.2)
CHLORIDE SERPL-SCNC: 106 MMOL/L (ref 98–107)
CO2 SERPL-SCNC: 23 MMOL/L (ref 22–29)
CREAT SERPL-MCNC: 0.7 MG/DL (ref 0.7–1.2)
EOSINOPHIL # BLD: 0.46 K/UL (ref 0.05–0.5)
EOSINOPHILS RELATIVE PERCENT: 7 % (ref 0–6)
ERYTHROCYTE [DISTWIDTH] IN BLOOD BY AUTOMATED COUNT: 13.8 % (ref 11.5–15)
GFR SERPL CREATININE-BSD FRML MDRD: >60 ML/MIN/1.73M2
GLUCOSE BLD-MCNC: 86 MG/DL (ref 74–99)
GLUCOSE BLD-MCNC: 90 MG/DL (ref 74–99)
GLUCOSE SERPL-MCNC: 93 MG/DL (ref 74–99)
HCT VFR BLD AUTO: 38.2 % (ref 37–54)
HGB BLD-MCNC: 12.6 G/DL (ref 12.5–16.5)
IMM GRANULOCYTES # BLD AUTO: 0.04 K/UL (ref 0–0.58)
IMM GRANULOCYTES NFR BLD: 1 % (ref 0–5)
LYMPHOCYTES NFR BLD: 1.23 K/UL (ref 1.5–4)
LYMPHOCYTES RELATIVE PERCENT: 20 % (ref 20–42)
MAGNESIUM SERPL-MCNC: 1.9 MG/DL (ref 1.6–2.6)
MCH RBC QN AUTO: 32.2 PG (ref 26–35)
MCHC RBC AUTO-ENTMCNC: 33 G/DL (ref 32–34.5)
MCV RBC AUTO: 97.7 FL (ref 80–99.9)
MONOCYTES NFR BLD: 0.92 K/UL (ref 0.1–0.95)
MONOCYTES NFR BLD: 15 % (ref 2–12)
NEUTROPHILS NFR BLD: 57 % (ref 43–80)
NEUTS SEG NFR BLD: 3.58 K/UL (ref 1.8–7.3)
PLATELET # BLD AUTO: 253 K/UL (ref 130–450)
PMV BLD AUTO: 10.4 FL (ref 7–12)
POTASSIUM SERPL-SCNC: 3.4 MMOL/L (ref 3.5–5)
RBC # BLD AUTO: 3.91 M/UL (ref 3.8–5.8)
SODIUM SERPL-SCNC: 141 MMOL/L (ref 132–146)
WBC OTHER # BLD: 6.3 K/UL (ref 4.5–11.5)

## 2023-12-13 ASSESSMENT — PAIN DESCRIPTION - LOCATION: LOCATION: NECK

## 2023-12-13 ASSESSMENT — PAIN SCALES - GENERAL
PAINLEVEL_OUTOF10: 1
PAINLEVEL_OUTOF10: 0

## 2023-12-13 ASSESSMENT — PAIN DESCRIPTION - DESCRIPTORS: DESCRIPTORS: ACHING

## 2023-12-13 NOTE — PLAN OF CARE
Problem: Safety - Adult  Goal: Free from fall injury  12/13/2023 0949 by Krista Shrestha RN  Outcome: Progressing     Problem: ABCDS Injury Assessment  Goal: Absence of physical injury  12/13/2023 0949 by Krista Shrestha RN  Outcome: Progressing     Problem: Pain  Goal: Verbalizes/displays adequate comfort level or baseline comfort level  12/13/2023 0949 by Krista Shrestha RN  Outcome: Progressing     Problem: Skin/Tissue Integrity  Goal: Absence of new skin breakdown  Description: 1. Monitor for areas of redness and/or skin breakdown  2. Assess vascular access sites hourly  3. Every 4-6 hours minimum:  Change oxygen saturation probe site  4. Every 4-6 hours:  If on nasal continuous positive airway pressure, respiratory therapy assess nares and determine need for appliance change or resting period.   12/13/2023 0949 by Krista Sherstha RN  Outcome: Progressing

## 2023-12-13 NOTE — DISCHARGE SUMMARY
Hospital Medicine Discharge Summary    Patient ID: Noris Jolley      Patient's PCP: Latha Castano MD    Admit Date: 11/30/2023     Discharge Date:   12/13/23    Admitting Physician: No admitting provider for patient encounter. Discharge Physician: Anna Antoine MD     Discharge Diagnoses: Active Hospital Problems    Diagnosis Date Noted    Dysphagia [R13.10] 12/13/2023    Acute cystitis [N30.00] 12/01/2023    Altered mental status [R41.82] 12/01/2023    Delirium [R41.0] 11/30/2023       The patient was seen and examined on day of discharge and this discharge summary is in conjunction with any daily progress note from day of discharge. Hospital Course:     Mr. Noris Jolley, a 80y.o. year old male  who  has a past medical history of Atrial fibrillation ([address]), Cancer ([address]), Hyperlipidemia, Hypertension, and Syncope and collapse. Patient presented to the hospital with chief complaint of confusion for 3 days. CT head negative for acute process. UA found to be pan sensitive Klebsiella. Neurology consulted. They felt this was secondary to UTI and acute delirium. No further work up needed from their standpoint. Patient failed swallow evaluation and MBSS. Daughter requested that melatonin and seroquel be stopped to see if mental status improves. Repeat UA with some bacteria. Repeat MBSS still recommending NPO. As per daughter, she does not want to proceed with feeding tube, but she does want to proceed with pleasure feeding. However, not ready for hospice at this time. She would like to sign a waiver for feeding knowing that it is against what we are advising and knows the risks of potential aspiration, pneumonia, hypoxia, permanent disability, and death. Patient placed on pureed and pudding thick. Daughter asked for thin liquids-understands risks. Placed on thin liquids with waiver signed. Patient was discharged to SNF in stable condition after being sitter free for 48 hours.

## 2023-12-13 NOTE — CARE COORDINATION
Social Work/ Case Management Transition of Care Planning ([address], [address]):     Discharge order noted. SW spoke with facility who can take today, transport scheduled with [facility] for noon . Family, RN and facility aware.  Ambulance form complete

## 2023-12-13 NOTE — PLAN OF CARE
Problem: Safety - Adult  Goal: Free from fall injury  12/13/2023 0030 by Glai Parrish RN  Outcome: Progressing     Problem: ABCDS Injury Assessment  Goal: Absence of physical injury  12/13/2023 0030 by Gali Parrish RN  Outcome: Progressing     Problem: Pain  Goal: Verbalizes/displays adequate comfort level or baseline comfort level  12/13/2023 0030 by Gali Parrish RN  Outcome: Progressing     Problem: Skin/Tissue Integrity  Goal: Absence of new skin breakdown  Description: 1. Monitor for areas of redness and/or skin breakdown  2. Assess vascular access sites hourly  3. Every 4-6 hours minimum:  Change oxygen saturation probe site  4. Every 4-6 hours:  If on nasal continuous positive airway pressure, respiratory therapy assess nares and determine need for appliance change or resting period.   12/13/2023 0030 by Gali Parrish RN  Outcome: Progressing

## 2024-01-09 LAB
BILIRUB UR QL STRIP: NEGATIVE
CLARITY UR: CLEAR
COLOR UR: YELLOW
COMMENT: NORMAL
GLUCOSE UR STRIP-MCNC: NEGATIVE MG/DL
HGB UR QL STRIP.AUTO: NEGATIVE
KETONES UR STRIP-MCNC: NEGATIVE MG/DL
LEUKOCYTE ESTERASE UR QL STRIP: NEGATIVE
NITRITE UR QL STRIP: NEGATIVE
PH UR STRIP: 7 [PH] (ref 5–9)
PROT UR STRIP-MCNC: NEGATIVE MG/DL
SP GR UR STRIP: 1.01 (ref 1–1.03)
UROBILINOGEN UR STRIP-ACNC: 1 EU/DL (ref 0–1)

## 2024-01-11 LAB
MICROORGANISM SPEC CULT: ABNORMAL
MICROORGANISM SPEC CULT: ABNORMAL
SPECIMEN DESCRIPTION: ABNORMAL

## (undated) DEVICE — COVER,TABLE,60X90,STERILE: Brand: MEDLINE

## (undated) DEVICE — SHEET DRAPE FULL 70X100

## (undated) DEVICE — APPLICATOR MEDICATED 26 CC SOLUTION HI LT ORNG CHLORAPREP

## (undated) DEVICE — SUTURE STRATAFIX SYMMETRIC SZ 1 L18IN ABSRB VLT CT1 L36CM SXPP1A404

## (undated) DEVICE — GAUZE,SPONGE,4"X4",16PLY,STRL,LF,10/TRAY: Brand: MEDLINE

## (undated) DEVICE — DRESSING,GAUZE,XEROFORM,CURAD,5"X9",ST: Brand: CURAD

## (undated) DEVICE — INTENDED FOR TISSUE SEPARATION, AND OTHER PROCEDURES THAT REQUIRE A SHARP SURGICAL BLADE TO PUNCTURE OR CUT.: Brand: BARD-PARKER ® STAINLESS STEEL BLADES

## (undated) DEVICE — COVER,LIGHT HANDLE,FLX,1/PK: Brand: MEDLINE INDUSTRIES, INC.

## (undated) DEVICE — ELECTRODE PT RET AD L9FT HI MOIST COND ADH HYDRGEL CORDED

## (undated) DEVICE — SMARTGOWN BREATHABLE SURGICAL GOWN: Brand: CONVERTORS

## (undated) DEVICE — DOUBLE BASIN SET: Brand: MEDLINE INDUSTRIES, INC.

## (undated) DEVICE — PENCIL ES L3M BTTN SWCH HOLSTER W/ BLDE ELECTRD EDGE

## (undated) DEVICE — TUBING, SUCTION, 1/4" X 10', STRAIGHT: Brand: MEDLINE

## (undated) DEVICE — BLADE REPROCESS SAGITAL SAW 25X.89X89.5MM

## (undated) DEVICE — DRAPE,ORTHOMAX ,HIP,W/POUCHES: Brand: MEDLINE

## (undated) DEVICE — GLOVE ORANGE PI 8 1/2   MSG9085

## (undated) DEVICE — SET MAJOR INSTR ORTHO

## (undated) DEVICE — TOWEL,OR,DSP,ST,BLUE,STD,6/PK,12PK/CS: Brand: MEDLINE

## (undated) DEVICE — Z DISCONTINUED PER MEDLINE USE 2741943 DRESSING AQUACEL 10 IN ALG W9XL25CM SIL CVR WTRPRF VIR BACT BARR ANTIMIC

## (undated) DEVICE — SPONGE LAP W18XL18IN WHT COT 4 PLY FLD STRUNG RADPQ DISP ST

## (undated) DEVICE — SURGICAL PROCEDURE PACK ORTH IV ECLIPSE STRL

## (undated) DEVICE — MARKER,SKIN,WI/RULER AND LABELS: Brand: MEDLINE

## (undated) DEVICE — SYRINGE IRRIG 60ML SFT PLIABLE BLB EZ TO GRP 1 HND USE W/

## (undated) DEVICE — BANDAGE COMPR W6INXL5YD SELF ADH COHESIVE CO FLX

## (undated) DEVICE — PITCHER PT 1200ML W HNDL CSR WRP

## (undated) DEVICE — GOWN,SIRUS,POLYRNF,BRTHSLV,XLN/XL,20/CS: Brand: MEDLINE

## (undated) DEVICE — YANKAUER,BULB TIP,W/O VENT,RIGID,STERILE: Brand: MEDLINE

## (undated) DEVICE — GLOVE SURG SZ 9 THK91MIL LTX FREE SYN POLYISOPRENE ANTI

## (undated) DEVICE — 3M™ STERI-DRAPE™ U-DRAPE 1015: Brand: STERI-DRAPE™

## (undated) DEVICE — NDL CNTR 40CT FM MAG: Brand: MEDLINE INDUSTRIES, INC.

## (undated) DEVICE — CLOSURE SKIN FLX NONINVASIVE PRELOC TECHNOLOGY FOR 24IN

## (undated) DEVICE — SHEET SUPPORT